# Patient Record
Sex: MALE | Race: WHITE | NOT HISPANIC OR LATINO | Employment: OTHER | URBAN - METROPOLITAN AREA
[De-identification: names, ages, dates, MRNs, and addresses within clinical notes are randomized per-mention and may not be internally consistent; named-entity substitution may affect disease eponyms.]

---

## 2017-07-24 ENCOUNTER — OFFICE VISIT (OUTPATIENT)
Dept: FAMILY MEDICINE | Facility: CLINIC | Age: 72
End: 2017-07-24
Payer: COMMERCIAL

## 2017-07-24 VITALS
DIASTOLIC BLOOD PRESSURE: 79 MMHG | RESPIRATION RATE: 16 BRPM | WEIGHT: 180.13 LBS | SYSTOLIC BLOOD PRESSURE: 132 MMHG | TEMPERATURE: 98 F | BODY MASS INDEX: 27.3 KG/M2 | HEIGHT: 68 IN | HEART RATE: 85 BPM

## 2017-07-24 DIAGNOSIS — E78.5 HYPERLIPIDEMIA LDL GOAL <130: ICD-10-CM

## 2017-07-24 DIAGNOSIS — J30.0 CHRONIC VASOMOTOR RHINITIS: ICD-10-CM

## 2017-07-24 DIAGNOSIS — I10 ESSENTIAL HYPERTENSION, BENIGN: ICD-10-CM

## 2017-07-24 DIAGNOSIS — R21 RASH AND OTHER NONSPECIFIC SKIN ERUPTION: ICD-10-CM

## 2017-07-24 DIAGNOSIS — Z00.00 ROUTINE HISTORY AND PHYSICAL EXAMINATION OF ADULT: Primary | ICD-10-CM

## 2017-07-24 LAB
ALT SERPL W P-5'-P-CCNC: 24 U/L (ref 0–70)
CHOLEST SERPL-MCNC: 164 MG/DL
HDLC SERPL-MCNC: 46 MG/DL
LDLC SERPL CALC-MCNC: 87 MG/DL
NONHDLC SERPL-MCNC: 118 MG/DL
TRIGL SERPL-MCNC: 157 MG/DL

## 2017-07-24 PROCEDURE — 36415 COLL VENOUS BLD VENIPUNCTURE: CPT | Performed by: FAMILY MEDICINE

## 2017-07-24 PROCEDURE — 99397 PER PM REEVAL EST PAT 65+ YR: CPT | Performed by: FAMILY MEDICINE

## 2017-07-24 PROCEDURE — 80061 LIPID PANEL: CPT | Performed by: FAMILY MEDICINE

## 2017-07-24 PROCEDURE — 84460 ALANINE AMINO (ALT) (SGPT): CPT | Performed by: FAMILY MEDICINE

## 2017-07-24 RX ORDER — TRIAMCINOLONE ACETONIDE 5 MG/G
CREAM TOPICAL
Qty: 30 G | Refills: 3 | Status: SHIPPED | OUTPATIENT
Start: 2017-07-24 | End: 2018-07-27

## 2017-07-24 RX ORDER — LOVASTATIN 40 MG
40 TABLET ORAL AT BEDTIME
Qty: 90 TABLET | Refills: 3 | Status: SHIPPED | OUTPATIENT
Start: 2017-07-24 | End: 2018-07-27

## 2017-07-24 RX ORDER — FLUTICASONE PROPIONATE 50 MCG
1-2 SPRAY, SUSPENSION (ML) NASAL DAILY PRN
Qty: 16 G | Refills: 11 | Status: SHIPPED | OUTPATIENT
Start: 2017-07-24 | End: 2018-07-27

## 2017-07-24 RX ORDER — ATENOLOL 25 MG/1
25 TABLET ORAL DAILY
Qty: 90 TABLET | Refills: 3 | Status: SHIPPED | OUTPATIENT
Start: 2017-07-24 | End: 2018-07-27

## 2017-07-24 NOTE — PATIENT INSTRUCTIONS
Preventive Health Recommendations:       Male Ages 65 and over    Yearly exam:             See your health care provider every year in order to  o   Review health changes.   o   Discuss preventive care.    o   Review your medicines if your doctor has prescribed any.    Talk with your health care provider about whether you should have a test to screen for prostate cancer (PSA).    Every 3 years, have a diabetes test (fasting glucose). If you are at risk for diabetes, you should have this test more often.    Every 5 years, have a cholesterol test. Have this test more often if you are at risk for high cholesterol or heart disease.     Every 10 years, have a colonoscopy. Or, have a yearly FIT test (stool test). These exams will check for colon cancer.    Talk to with your health care provider about screening for Abdominal Aortic Aneurysm if you have a family history of AAA or have a history of smoking.  Shots:     Get a flu shot each year.     Get a tetanus shot every 10 years.     Talk to your doctor about your pneumonia vaccines. There are now two you should receive - Pneumovax (PPSV 23) and Prevnar (PCV 13).    Talk to your doctor about a shingles vaccine.     Talk to your doctor about the hepatitis B vaccine.  Nutrition:     Eat at least 5 servings of fruits and vegetables each day.     Eat whole-grain bread, whole-wheat pasta and brown rice instead of white grains and rice.     Talk to your doctor about Calcium and Vitamin D.   Lifestyle    Exercise for at least 150 minutes a week (30 minutes a day, 5 days a week). This will help you control your weight and prevent disease.     Limit alcohol to one drink per day.     No smoking.     Wear sunscreen to prevent skin cancer.     See your dentist every six months for an exam and cleaning.     See your eye doctor every 1 to 2 years to screen for conditions such as glaucoma, macular degeneration and cataracts.      ASSESSMENT / PLAN:   (Z00.00) Routine history and  "physical examination of adult  (primary encounter diagnosis)  Comment:   Plan:   End of Life Planning:  Patient currently has an advanced directive: Yes.  Practitioner is supportive of decision.    COUNSELING:  Reviewed preventive health counseling, as reflected in patient instructions       Regular exercise       Healthy diet/nutrition       Vision screening       Hearing screening  Estimated body mass index is 27.8 kg/(m^2) as calculated from the following:    Height as of this encounter: 5' 7.5\" (1.715 m).    Weight as of this encounter: 180 lb 2 oz (81.7 kg).     reports that he quit smoking about 48 years ago. His smoking use included Cigarettes. He has a 7.00 pack-year smoking history. He has never used smokeless tobacco.    Appropriate preventive services were discussed with this patient, including applicable screening as appropriate for cardiovascular disease, diabetes, osteopenia/osteoporosis, and glaucoma.  As appropriate for age/gender, discussed screening for colorectal cancer, prostate cancer, breast cancer, and cervical cancer. Checklist reviewing preventive services available has been given to the patient.    Reviewed patients plan of care and provided an AVS. The Basic Care Plan (routine screening as documented in Health Maintenance) for Velasquez meets the Care Plan requirement. This Care Plan has been established and reviewed with the Patient.    Counseling Resources:  ATP IV Guidelines  Pooled Cohorts Equation Calculator  Breast Cancer Risk Calculator  FRAX Risk Assessment  ICSI Preventive Guidelines  Dietary Guidelines for Americans, 2010  USDA's MyPlate  ASA Prophylaxis  Lung CA Screening    (E78.5) Hyperlipidemia LDL goal <130  Comment:   Plan: lovastatin (MEVACOR) 40 MG tablet        Use the lower chol diet and daily exercise. Do the fasting lipids annually.     (I10) Essential hypertension, benign  Comment:   Plan: atenolol (TENORMIN) 25 MG tablet        Monitor and record the BP readings and " the goal for the average is under 130/80.   Use the med and the non drug therapies.     (J30.0) Chronic vasomotor rhinitis  Comment:   Plan: fluticasone (FLONASE) 50 MCG/ACT spray        Instructions given on diagnoses and refills done.     (R21) Rash and other nonspecific skin eruption  Comment:   Plan: triamcinolone (KENALOG) 0.5 % cream         Instructions given on diagnoses and refills done.

## 2017-07-24 NOTE — MR AVS SNAPSHOT
After Visit Summary   7/24/2017    Velasquez Dle Rio    MRN: [id number]           Patient Information     Date Of Birth          1945        Visit Information        Provider Department      7/24/2017 9:20 AM Cecilio Diaz MD [facility]        Today's Diagnoses     Routine history and physical examination of adult    -  1    Hyperlipidemia LDL goal <130        Essential hypertension, benign        Chronic vasomotor rhinitis        Rash and other nonspecific skin eruption          Care Instructions      Preventive Health Recommendations:       Male Ages 65 and over    Yearly exam:             See your health care provider every year in order to  o   Review health changes.   o   Discuss preventive care.    o   Review your medicines if your doctor has prescribed any.    Talk with your health care provider about whether you should have a test to screen for prostate cancer (PSA).    Every 3 years, have a diabetes test (fasting glucose). If you are at risk for diabetes, you should have this test more often.    Every 5 years, have a cholesterol test. Have this test more often if you are at risk for high cholesterol or heart disease.     Every 10 years, have a colonoscopy. Or, have a yearly FIT test (stool test). These exams will check for colon cancer.    Talk to with your health care provider about screening for Abdominal Aortic Aneurysm if you have a family history of AAA or have a history of smoking.  Shots:     Get a flu shot each year.     Get a tetanus shot every 10 years.     Talk to your doctor about your pneumonia vaccines. There are now two you should receive - Pneumovax (PPSV 23) and Prevnar (PCV 13).    Talk to your doctor about a shingles vaccine.     Talk to your doctor about the hepatitis B vaccine.  Nutrition:     Eat at least 5 servings of fruits and vegetables each day.     Eat whole-grain bread, whole-wheat pasta and brown rice instead of white grains and rice.  "    Talk to your doctor about Calcium and Vitamin D.   Lifestyle    Exercise for at least 150 minutes a week (30 minutes a day, 5 days a week). This will help you control your weight and prevent disease.     Limit alcohol to one drink per day.     No smoking.     Wear sunscreen to prevent skin cancer.     See your dentist every six months for an exam and cleaning.     See your eye doctor every 1 to 2 years to screen for conditions such as glaucoma, macular degeneration and cataracts.      ASSESSMENT / PLAN:   (Z00.00) Routine history and physical examination of adult  (primary encounter diagnosis)  Comment:   Plan:   End of Life Planning:  Patient currently has an advanced directive: Yes.  Practitioner is supportive of decision.    COUNSELING:  Reviewed preventive health counseling, as reflected in patient instructions       Regular exercise       Healthy diet/nutrition       Vision screening       Hearing screening  Estimated body mass index is 27.8 kg/(m^2) as calculated from the following:    Height as of this encounter: 5' 7.5\" (1.715 m).    Weight as of this encounter: 180 lb 2 oz (81.7 kg).     reports that he quit smoking about 48 years ago. His smoking use included Cigarettes. He has a 7.00 pack-year smoking history. He has never used smokeless tobacco.    Appropriate preventive services were discussed with this patient, including applicable screening as appropriate for cardiovascular disease, diabetes, osteopenia/osteoporosis, and glaucoma.  As appropriate for age/gender, discussed screening for colorectal cancer, prostate cancer, breast cancer, and cervical cancer. Checklist reviewing preventive services available has been given to the patient.    Reviewed patients plan of care and provided an AVS. The Basic Care Plan (routine screening as documented in Health Maintenance) for Velasquez meets the Care Plan requirement. This Care Plan has been established and reviewed with the Patient.    Counseling " Resources:  ATP IV Guidelines  Pooled Cohorts Equation Calculator  Breast Cancer Risk Calculator  FRAX Risk Assessment  ICSI Preventive Guidelines  Dietary Guidelines for Americans, 2010  USDA's MyPlate  ASA Prophylaxis  Lung CA Screening    (E78.5) Hyperlipidemia LDL goal <130  Comment:   Plan: lovastatin (MEVACOR) 40 MG tablet        Use the lower chol diet and daily exercise. Do the fasting lipids annually.     (I10) Essential hypertension, benign  Comment:   Plan: atenolol (TENORMIN) 25 MG tablet        Monitor and record the BP readings and the goal for the average is under 130/80.   Use the med and the non drug therapies.     (J30.0) Chronic vasomotor rhinitis  Comment:   Plan: fluticasone (FLONASE) 50 MCG/ACT spray        Instructions given on diagnoses and refills done.     (R21) Rash and other nonspecific skin eruption  Comment:   Plan: triamcinolone (KENALOG) 0.5 % cream         Instructions given on diagnoses and refills done.            Follow-ups after your visit        Who to contact     If you have questions or need follow up information about today's clinic visit or your schedule please contact [facility] directly at [phone].  Normal or non-critical lab and imaging results will be communicated to you by CLAREDhart, letter or phone within 4 business days after the clinic has received the results. If you do not hear from us within 7 days, please contact the clinic through WallCompasst or phone. If you have a critical or abnormal lab result, we will notify you by phone as soon as possible.  Submit refill requests through Ahead or call your pharmacy and they will forward the refill request to us. Please allow 3 business days for your refill to be completed.          Additional Information About Your Visit        CLAREDharCareTree Information     Ahead lets you send messages to your doctor, view your test results, renew your prescriptions, schedule appointments and more. To sign up, go to  [email or web address] . Click on \"Log in\" on the left side of the screen, which will take you to the Welcome page. Then click on \"Sign up Now\" on the right side of the page.     You will be asked to enter the access code listed below, as well as some personal information. Please follow the directions to create your username and password.     Your access code is: [email or web address]  Expires: 10/22/2017 10:07 AM     Your access code will  in 90 days. If you need help or a new code, please call your [place] clinic or [phone].        Care EveryWhere ID     This is your Care EveryWhere ID. This could be used by other organizations to access your [place] medical records  [id number]        Your Vitals Were     Pulse Temperature Respirations Height BMI (Body Mass Index)       85 98  F (36.7  C) (Tympanic) 16 5' 7.5\" (1.715 m) 27.8 kg/m2        Blood Pressure from Last 3 Encounters:   17 132/79   08/10/16 125/76   16 119/77    Weight from Last 3 Encounters:   17 180 lb 2 oz (81.7 kg)   08/10/16 180 lb (81.6 kg)   16 180 lb (81.6 kg)              We Performed the Following     ALT     Lipid panel reflex to direct LDL     OFFICE/OUTPT VISIT,EST,LEVL III          Where to get your medicines      These medications were sent to [facility] - [facility] [address]  [address] [address]     Phone:  [phone]     atenolol 25 MG tablet    fluticasone 50 MCG/ACT spray    lovastatin 40 MG tablet    triamcinolone 0.5 % cream          Primary Care Provider Office Phone # Fax #    Cecilio Diaz [phone]       [facility] [address] [address]        Equal Access to Services     STEPHON HAY AH: Ayan Carcamo, monster posey, flavio torresarash la'aan ah. [facility] [phone].    ATENCIÓN: Si habla español, tiene a bradley disposición servicios " jose de asistencia lingüística. Thania israel [phone].    We comply with applicable federal civil rights laws and [place] laws. We do not discriminate on the basis of race, color, national origin, age, disability sex, sexual orientation or gender identity.            Thank you!     Thank you for choosing [facility]  for your care. Our goal is always to provide you with excellent care. Hearing back from our patients is one way we can continue to improve our services. Please take a few minutes to complete the written survey that you may receive in the mail after your visit with us. Thank you!             Your Updated Medication List - Protect others around you: Learn how to safely use, store and throw away your medicines at www.disposemymeds.org.          This list is accurate as of: 7/24/17 10:11 AM.  Always use your most recent med list.                   Brand Name Dispense Instructions for use Diagnosis    aspirin 81 MG tablet      1 TABLET DAILY    Essential hypertension, benign       atenolol 25 MG tablet    TENORMIN    90 tablet    Take 1 tablet (25 mg) by mouth daily    Essential hypertension, benign       fluticasone 50 MCG/ACT spray    FLONASE    16 g    Spray 1-2 sprays into both nostrils daily as needed for rhinitis    Chronic vasomotor rhinitis       lovastatin 40 MG tablet    MEVACOR    90 tablet    Take 1 tablet (40 mg) by mouth At Bedtime    Hyperlipidemia LDL goal <130       triamcinolone 0.5 % cream    KENALOG    30 g    Apply topically twice a day as needed to affected area. Call to refill.    Rash and other nonspecific skin eruption

## 2017-07-24 NOTE — LETTER
Velasquez Del Rio  [address]  [place] [address]        July 24, 2017          Dear Eliane,    We are writing to inform you of your test results.    Your lab results were normal.    Component      Latest Ref Rng & Units 7/24/2017   Cholesterol      <200 mg/dL 164   Triglycerides      <150 mg/dL 157 (H)   HDL Cholesterol      >39 mg/dL 46   LDL Cholesterol Calculated      <100 mg/dL 87   Non HDL Cholesterol      <130 mg/dL 118   ALT      0 - 70 U/L 24       If you have any questions or concerns, please call the clinic at the number listed above.       Sincerely,      Cecilio Diaz MD

## 2017-07-24 NOTE — PROGRESS NOTES
SUBJECTIVE:   Velasquez Del Rio is a 71 year old male who presents for Preventive Visit.    Are you in the first 12 months of your Medicare Part B coverage?  No    Healthy Habits:    Do you get at least three servings of calcium containing foods daily (dairy, green leafy vegetables, etc.)? yes    Amount of exercise or daily activities, outside of work: patient does stretching exercises daily and sit ups and stationary bicycle.    Problems taking medications regularly No    Medication side effects: No    Have you had an eye exam in the past two years? [facility].    Do you see a dentist twice per year? yes    Do you have sleep apnea, excessive snoring or daytime drowsiness?no    COGNITIVE SCREEN  1) Repeat 3 items (Banana, Sunrise, Chair)    2) Clock draw: NORMAL  3) 3 item recall: Recalls 3 objects  Results: 3 items recalled: COGNITIVE IMPAIRMENT LESS LIKELY    Mini-CogTM Copyright [place]. Licensed by the author for use in [facility]; reprinted with permission ([email or web address]). All rights reserved.        Reviewed and updated as needed this visit by clinical staffTobacco  Allergies  Med Hx  Surg Hx  Fam Hx  Soc Hx        Reviewed and updated as needed this visit by Provider        Social History   Substance Use Topics     Smoking status: Former Smoker     Packs/day: 1.00     Years: 7.00     Types: Cigarettes     Quit date: 11/13/1968     Smokeless tobacco: Never Used     Alcohol use Yes      Comment: minimal       very little alcohol    Today's PHQ-2 Score:   PHQ-2 ( 1999 [employer]) 7/24/2017 7/22/2016   Q1: Little interest or pleasure in doing things 0 0   Q2: Feeling down, depressed or hopeless 0 0   PHQ-2 Score 0 0         Do you feel safe in your environment - Yes    Do you have a Health Care Directive?: Yes: Advance Directive has been received and scanned.    Current providers sharing in care for this patient include: Patient Care Team:  Cecilio Diaz MD as PCP - General (Family  "Practice)      Hearing impairment: No    Ability to successfully perform activities of daily living: Yes, no assistance needed     Fall risk:  Fallen 2 or more times in the past year?: No  Any fall with injury in the past year?: No      Home safety:  none identified      The following health maintenance items are reviewed in Epic and correct as of today:Health Maintenance   Topic Date Due     BMP Q1 YR  11/18/2014     PSA Q1 YR  11/18/2014     FALL RISK ASSESSMENT  08/24/2016     ADVANCE DIRECTIVE PLANNING Q5 YRS  11/14/2016     ALT Q1 YR  07/22/2017     LIPID MONITORING Q1 YEAR  07/22/2017     INFLUENZA VACCINE (SYSTEM ASSIGNED)  09/01/2017     COLONOSCOPY Q3 YR  08/10/2019     TETANUS IMMUNIZATION (SYSTEM ASSIGNED)  09/24/2022     PNEUMOCOCCAL  Completed     AORTIC ANEURYSM SCREENING (SYSTEM ASSIGNED)  Completed     ROS:  C: NEGATIVE for fever, chills, change in weight  I: NEGATIVE for worrisome rashes, moles or lesions  E: NEGATIVE for vision changes or irritation  E/M: NEGATIVE for ear, mouth and throat problems  R: NEGATIVE for significant cough or SOB  CV: NEGATIVE for chest pain, palpitations or peripheral edema  GI: NEGATIVE for nausea, abdominal pain, heartburn, or change in bowel habits  : NEGATIVE for frequency, dysuria, or hematuria  M: NEGATIVE for significant arthralgias or myalgia  N: NEGATIVE for weakness, dizziness or paresthesias  E: NEGATIVE for temperature intolerance, skin/hair changes  H: NEGATIVE for bleeding problems  P: NEGATIVE for changes in mood or affect    OBJECTIVE:   /79  Pulse 85  Temp 98  F (36.7  C) (Tympanic)  Resp 16  Ht 5' 7.5\" (1.715 m)  Wt 180 lb 2 oz (81.7 kg)  BMI 27.8 kg/m2 Estimated body mass index is 27.8 kg/(m^2) as calculated from the following:    Height as of this encounter: 5' 7.5\" (1.715 m).    Weight as of this encounter: 180 lb 2 oz (81.7 kg).  EXAM:   GENERAL APPEARANCE: healthy, alert and no distress  EYES: EOMI,  PERRL  HENT: ear canals and TM's " "normal and nose and mouth without ulcers or lesions  NECK: no adenopathy, no asymmetry, masses, or scars and thyroid normal to palpation  RESP: lungs clear to auscultation - no rales, rhonchi or wheezes  CV: regular rates and rhythm, normal S1 S2, no S3 or S4 and no murmur, click or rub -  ABDOMEN:  soft, nontender, no HSM or masses and bowel sounds normal  GU_male: testicles normal without atrophy or masses, no hernias and penis normal without urethral discharge  MS: extremities normal- no gross deformities noted, no evidence of inflammation in joints, FROM in all extremities.  MS: the hamstrings are tight; there is kyphosis of the thoracic  SKIN: no suspicious lesions or rashes  NEURO: Normal strength and tone, sensory exam grossly normal, mentation intact and speech normal  PSYCH: mentation appears normal and affect normal/bright  LYMPHATICS: No axillary, cervical, inguinal, or supraclavicular nodes      ASSESSMENT / PLAN:   (Z00.00) Routine history and physical examination of adult  (primary encounter diagnosis)  Comment:   Plan:   End of Life Planning:  Patient currently has an advanced directive: Yes.  Practitioner is supportive of decision.    COUNSELING:  Reviewed preventive health counseling, as reflected in patient instructions       Regular exercise       Healthy diet/nutrition       Vision screening       Hearing screening  Estimated body mass index is 27.8 kg/(m^2) as calculated from the following:    Height as of this encounter: 5' 7.5\" (1.715 m).    Weight as of this encounter: 180 lb 2 oz (81.7 kg).     reports that he quit smoking about 48 years ago. His smoking use included Cigarettes. He has a 7.00 pack-year smoking history. He has never used smokeless tobacco.    Appropriate preventive services were discussed with this patient, including applicable screening as appropriate for cardiovascular disease, diabetes, osteopenia/osteoporosis, and glaucoma.  As appropriate for age/gender, discussed " screening for colorectal cancer, prostate cancer, breast cancer, and cervical cancer. Checklist reviewing preventive services available has been given to the patient.    Reviewed patients plan of care and provided an AVS. The Basic Care Plan (routine screening as documented in Health Maintenance) for Velasquez meets the Care Plan requirement. This Care Plan has been established and reviewed with the Patient.    Counseling Resources:  ATP IV Guidelines  Pooled Cohorts Equation Calculator  Breast Cancer Risk Calculator  FRAX Risk Assessment  ICSI Preventive Guidelines  Dietary Guidelines for Americans, 2010  USDA's MyPlate  ASA Prophylaxis  Lung CA Screening    (E78.5) Hyperlipidemia LDL goal <130  Comment:   Plan: lovastatin (MEVACOR) 40 MG tablet        Use the lower chol diet and daily exercise. Do the fasting lipids annually.     (I10) Essential hypertension, benign  Comment:   Plan: atenolol (TENORMIN) 25 MG tablet        Monitor and record the BP readings and the goal for the average is under 130/80.   Use the med and the non drug therapies.     (J30.0) Chronic vasomotor rhinitis  Comment:   Plan: fluticasone (FLONASE) 50 MCG/ACT spray        Instructions given on diagnoses and refills done.     (R21) Rash and other nonspecific skin eruption  Comment:   Plan: triamcinolone (KENALOG) 0.5 % cream         Instructions given on diagnoses and refills done.      Cecilio Diaz MD  [facility]

## 2017-07-24 NOTE — NURSING NOTE
"Chief Complaint   Patient presents with     Wellness Visit     Patient is FASTING today.  Did not take any medications either.       Initial /79  Pulse 85  Temp 98  F (36.7  C) (Tympanic)  Resp 16  Ht 5' 7.5\" (1.715 m)  Wt 180 lb 2 oz (81.7 kg)  BMI 27.8 kg/m2 Estimated body mass index is 27.8 kg/(m^2) as calculated from the following:    Height as of this encounter: 5' 7.5\" (1.715 m).    Weight as of this encounter: 180 lb 2 oz (81.7 kg).    Medication Reconciliation:  complete    Ashley Pierce CMA (AAMA)  "

## 2017-09-25 ENCOUNTER — ALLIED HEALTH/NURSE VISIT (OUTPATIENT)
Dept: FAMILY MEDICINE | Facility: CLINIC | Age: 72
End: 2017-09-25
Payer: COMMERCIAL

## 2017-09-25 DIAGNOSIS — Z23 NEED FOR PROPHYLACTIC VACCINATION AND INOCULATION AGAINST INFLUENZA: Primary | ICD-10-CM

## 2017-09-25 PROCEDURE — G0008 ADMIN INFLUENZA VIRUS VAC: HCPCS

## 2017-09-25 PROCEDURE — 99207 ZZC NO CHARGE NURSE ONLY: CPT

## 2017-09-25 PROCEDURE — 90662 IIV NO PRSV INCREASED AG IM: CPT

## 2017-09-25 NOTE — PROGRESS NOTES
Injectable Influenza Immunization Documentation    1.  Is the person to be vaccinated sick today?   No    2. Does the person to be vaccinated have an allergy to a component   of the vaccine?   No    3. Has the person to be vaccinated ever had a serious reaction   to influenza vaccine in the past?   No    4. Has the person to be vaccinated ever had Guillain-Barré syndrome?   No    Form completed by Ashley Pierce CMA ([facility]) 1:57 PM 9/25/2017

## 2017-09-25 NOTE — MR AVS SNAPSHOT
"              After Visit Summary   2017    Velasquez Del Rio    MRN: [id number]           Patient Information     Date Of Birth          1945        Visit Information        Provider Department      2017 1:35 PM [facility]        Today's Diagnoses     Need for prophylactic vaccination and inoculation against influenza    -  1       Follow-ups after your visit        Who to contact     If you have questions or need follow up information about today's clinic visit or your schedule please contact [facility] directly at [phone].  Normal or non-critical lab and imaging results will be communicated to you by [employer], letter or phone within 4 business days after the clinic has received the results. If you do not hear from us within 7 days, please contact the clinic through [employer] or phone. If you have a critical or abnormal lab result, we will notify you by phone as soon as possible.  Submit refill requests through [employer] or call your pharmacy and they will forward the refill request to us. Please allow 3 business days for your refill to be completed.          Additional Information About Your Visit        MyChart Information     [employer] lets you send messages to your doctor, view your test results, renew your prescriptions, schedule appointments and more. To sign up, go to [email or web address] . Click on \"Log in\" on the left side of the screen, which will take you to the Welcome page. Then click on \"Sign up Now\" on the right side of the page.     You will be asked to enter the access code listed below, as well as some personal information. Please follow the directions to create your username and password.     Your access code is: [email or web address]  Expires: 10/22/2017 10:07 AM     Your access code will  in 90 days. If you need help or a new code, please call your [facility] or [phone].        Care EveryWhere ID     This is your Care " EveryWhere ID. This could be used by other organizations to access your [place] medical records  [id number]         Blood Pressure from Last 3 Encounters:   07/24/17 132/79   08/10/16 125/76   07/22/16 119/77    Weight from Last 3 Encounters:   07/24/17 180 lb 2 oz (81.7 kg)   08/10/16 180 lb (81.6 kg)   07/22/16 180 lb (81.6 kg)              We Performed the Following     ADMIN INFLUENZA (For MEDICARE Patients ONLY) []     FLU VACCINE, INCREASED ANTIGEN, PRESV FREE, AGE 65+ [85076]        Primary Care Provider Office Phone # Fax #    Cecilio Loco Diaz [phone] [address] [facility] [place] [address]        Equal Access to Services     STEPHON HAY : Hadii july medinao Solilibeth, waaxda luqadaha, qaybta kaalmada adeegyada, flavio maravilla . So [facility] [phone].    ATENCIÓN: Si habla español, tiene a bradley disposición servicios gratuitos de asistencia lingüística. Llame al [phone].    We comply with applicable federal civil rights laws and [place] laws. We do not discriminate on the basis of race, color, national origin, age, disability sex, sexual orientation or gender identity.            Thank you!     Thank you for choosing [facility]  for your care. Our goal is always to provide you with excellent care. Hearing back from our patients is one way we can continue to improve our services. Please take a few minutes to complete the written survey that you may receive in the mail after your visit with us. Thank you!             Your Updated Medication List - Protect others around you: Learn how to safely use, store and throw away your medicines at www.disposemymeds.org.          This list is accurate as of: 9/25/17  1:58 PM.  Always use your most recent med list.                   Brand Name Dispense Instructions for use Diagnosis    aspirin 81 MG tablet      1 TABLET DAILY    Essential hypertension, benign       atenolol 25 MG tablet     TENORMIN    90 tablet    Take 1 tablet (25 mg) by mouth daily    Essential hypertension, benign       fluticasone 50 MCG/ACT spray    FLONASE    16 g    Spray 1-2 sprays into both nostrils daily as needed for rhinitis    Chronic vasomotor rhinitis       lovastatin 40 MG tablet    MEVACOR    90 tablet    Take 1 tablet (40 mg) by mouth At Bedtime    Hyperlipidemia LDL goal <130       triamcinolone 0.5 % cream    KENALOG    30 g    Apply topically twice a day as needed to affected area. Call to refill.    Rash and other nonspecific skin eruption

## 2018-05-30 ENCOUNTER — TELEPHONE (OUTPATIENT)
Dept: FAMILY MEDICINE | Facility: CLINIC | Age: 73
End: 2018-05-30

## 2018-05-30 ENCOUNTER — OFFICE VISIT (OUTPATIENT)
Dept: FAMILY MEDICINE | Facility: CLINIC | Age: 73
End: 2018-05-30
Payer: COMMERCIAL

## 2018-05-30 VITALS
HEART RATE: 71 BPM | SYSTOLIC BLOOD PRESSURE: 125 MMHG | BODY MASS INDEX: 28.64 KG/M2 | WEIGHT: 185.6 LBS | TEMPERATURE: 98.1 F | DIASTOLIC BLOOD PRESSURE: 78 MMHG

## 2018-05-30 DIAGNOSIS — H00.012 HORDEOLUM EXTERNUM OF RIGHT LOWER EYELID: Primary | ICD-10-CM

## 2018-05-30 DIAGNOSIS — H02.843 SWELLING OF RIGHT EYELID: ICD-10-CM

## 2018-05-30 PROCEDURE — 99213 OFFICE O/P EST LOW 20 MIN: CPT | Performed by: NURSE PRACTITIONER

## 2018-05-30 RX ORDER — ERYTHROMYCIN 5 MG/G
0.25 OINTMENT OPHTHALMIC 2 TIMES DAILY
Qty: 1 G | Refills: 0 | Status: SHIPPED | OUTPATIENT
Start: 2018-05-30 | End: 2019-04-11

## 2018-05-30 ASSESSMENT — PAIN SCALES - GENERAL: PAINLEVEL: NO PAIN (0)

## 2018-05-30 NOTE — PROGRESS NOTES
SUBJECTIVE:   Velasquez Del Rio is a 72 year old male who presents to clinic today for the following health issues:      Chief Complaint   Patient presents with     Ent Problem     Symptoms for one week. Sty on right lower eye. Did notice a white spot at location, no drainage or pain.Has tried hot wet compress to no effect. Has had similar issues in the past. No injury or incident, does note that it was very windy the day before symptoms started.      Started last week with small red swollen area on lower eyelid.  Started using warm packs at that time and has not noticed improvement.  Reports some increased swelling/redness in lower eyelid since onset.    History of stye/infections in the past - treated successfully with topical antibiotic ointment.  Has appointment for next week with [facility] here in [place] if needed.    No eye pain, vision changes or discharge in right eye.  Some mild itching in right eye reported.    Problem list and histories reviewed & adjusted, as indicated.  Additional history: as documented    Patient Active Problem List   Diagnosis     Benign neoplasm of colon     Hyperlipidemia LDL goal <130     Advanced directives, counseling/discussion     Health Care Home     Herpes zoster     Diverticulosis of large intestine     Family history of colon cancer     Family history of malignant neoplasm of breast     Essential hypertension with goal blood pressure less than 140/90     Dermatitis     Chronic vasomotor rhinitis     Past Surgical History:   Procedure Laterality Date     COLONOSCOPY  2001    negative      COLONOSCOPY  12/13/2011    Procedure:COLONOSCOPY; Colonoscopy; Surgeon:DARCIE FISHER; Location:[facility]     COLONOSCOPY N/A 8/10/2016    Procedure: COLONOSCOPY;  Surgeon: Morales Renteria MD;  Location: [facility]       Social History   Substance Use Topics     Smoking status: Former Smoker     Packs/day: 1.00     Years: 7.00     Types: Cigarettes     Quit date: 11/13/1968      Smokeless tobacco: Never Used     Alcohol use Yes      Comment: minimal     Family History   Problem Relation Age of Onset     Hypertension Mother      CEREBROVASCULAR DISEASE Mother       of a stroke     Cancer - colorectal Mother      age 70s     Prostate Cancer Father      had prostate removed     DIABETES Maternal Grandmother      CANCER Brother      bladder cancer     Colon Cancer Daughter 53     colon cancer surger     Breast Cancer Daughter      Asthma No family hx of      C.A.D. No family hx of          Current Outpatient Prescriptions   Medication Sig Dispense Refill     ASPIRIN 81 MG OR TABS 1 TABLET DAILY       atenolol (TENORMIN) 25 MG tablet Take 1 tablet (25 mg) by mouth daily 90 tablet 3     erythromycin (ROMYCIN) ophthalmic ointment Place 0.25 inches into the right eye 2 times daily 1 g 0     fluticasone (FLONASE) 50 MCG/ACT spray Spray 1-2 sprays into both nostrils daily as needed for rhinitis 16 g 11     lovastatin (MEVACOR) 40 MG tablet Take 1 tablet (40 mg) by mouth At Bedtime 90 tablet 3     triamcinolone (KENALOG) 0.5 % cream Apply topically twice a day as needed to affected area. Call to refill. (Patient not taking: Reported on 2018) 30 g 3     Allergies   Allergen Reactions     Nkda [No Known Drug Allergies]      Recent Labs   Lab Test  17   1013  16   0953  08/24/15   0956   13   1004  11/15/12   0947   LDL  87  84  88   < >  113  113   HDL  46  48  44   < >  46  42   TRIG  157*  172*  160*   < >  150  172*   ALT  24  34  25   < >  32  28   CR   --    --    --    --   0.85  0.91   GFRESTIMATED   --    --    --    --   89  83   GFRESTBLACK   --    --    --    --   >90  >90   POTASSIUM   --    --    --    --   4.2  4.3    < > = values in this interval not displayed.      BP Readings from Last 3 Encounters:   18 125/78   17 132/79   08/10/16 125/76    Wt Readings from Last 3 Encounters:   18 185 lb 9.6 oz (84.2 kg)   17 180 lb 2 oz (81.7 kg)    08/10/16 180 lb (81.6 kg)                  Labs reviewed in EPIC    Reviewed and updated as needed this visit by clinical staff  Tobacco  Allergies  Meds  Med Hx  Surg Hx  Fam Hx  Soc Hx      Reviewed and updated as needed this visit by Provider         ROS:  Constitutional, HEENT, cardiovascular, pulmonary, GI, , musculoskeletal, neuro, skin, endocrine and psych systems are negative, except as otherwise noted.    OBJECTIVE:     /78  Pulse 71  Temp 98.1  F (36.7  C) (Tympanic)  Wt 185 lb 9.6 oz (84.2 kg)  BMI 28.64 kg/m2  Body mass index is 28.64 kg/(m^2).  GENERAL: healthy, alert and no distress  EYES: PERRL, EOMI, eyelids- hordeolum/sty OD inner eye and with surrounding mild swelling      Diagnostic Test Results:  none     ASSESSMENT/PLAN:     1. Hordeolum externum of right lower eyelid   The risks, benefits and treatment options of prescribed medications or other treatments have been discussed with the patient. The patient verbalized their understanding and should call or follow up if no improvement or if they develop further problems.    - erythromycin (ROMYCIN) ophthalmic ointment; Place 0.25 inches into the right eye 2 times daily  Dispense: 1 g; Refill: 0  - OPHTHALMOLOGY ADULT REFERRAL    2. Swelling of right eyelid     - erythromycin (ROMYCIN) ophthalmic ointment; Place 0.25 inches into the right eye 2 times daily  Dispense: 1 g; Refill: 0  - OPHTHALMOLOGY ADULT REFERRAL    Patient Instructions     Sty (or Stye)  A sty is an infection of the oil gland of the eyelid. It may develop into a small pocket of pus (an abscess). This can cause pain, redness, and swelling. In early stages, a sty is treated with antibiotic cream, eye drops, or a small towel soaked in warm water (a warm compress). More severe cases may need to be opened and drained by a healthcare provider.  Home care    Eye drops or ointment are usually prescribed to treat the infection. Use these as directed.     Artificial tears  may also be used to lubricate the eye and make it more comfortable. You can buy these over the counter without a prescription. Talk with your healthcare provider before using any over-the-counter treatment for a sty.    Apply a warm, damp towel to the affected eye for at least 5 minutes, 3 to 4 times a day for a week. Warm compresses open the pores and speed the healing. But if the compresses are too hot, they may burn your eyelid.    Sometimes the sty will drain with this treatment alone. If this happens, keep using the antibiotic until all the redness and swelling are gone.    Wash your hands before and after touching the infected eyelid to avoid spreading the infection.    Don t squeeze or try to break open the sty.  Follow-up care  Follow up with your healthcare provider, or as advised.   When to seek medical advice  Call your healthcare provider right away if any of these occur:    Increase in swelling or redness around the eyelid after 48 to 72 hours    Increase in eye pain or the eyelid blisters    Increase in warmth--the eyelid feels hot    Drainage of blood or thick pus from the sty    Blister on the eyelid    Inability to open the eyelid due to swelling    Fever of 100.4 F (38 C) or above, or as directed by your provider    Vision changes    Headache or stiff neck    The sty comes back  Date Last Reviewed: 8/1/2017 2000-2017 [employer]). [address] [place] [address], [place] [address]. All rights reserved. This information is not intended as a substitute for professional medical care. Always follow your healthcare professional's instructions.            Smiley Plascencia NP  [facility]

## 2018-05-30 NOTE — PATIENT INSTRUCTIONS
Sty (or Stye)  A sty is an infection of the oil gland of the eyelid. It may develop into a small pocket of pus (an abscess). This can cause pain, redness, and swelling. In early stages, a sty is treated with antibiotic cream, eye drops, or a small towel soaked in warm water (a warm compress). More severe cases may need to be opened and drained by a healthcare provider.  Home care    Eye drops or ointment are usually prescribed to treat the infection. Use these as directed.     Artificial tears may also be used to lubricate the eye and make it more comfortable. You can buy these over the counter without a prescription. Talk with your healthcare provider before using any over-the-counter treatment for a sty.    Apply a warm, damp towel to the affected eye for at least 5 minutes, 3 to 4 times a day for a week. Warm compresses open the pores and speed the healing. But if the compresses are too hot, they may burn your eyelid.    Sometimes the sty will drain with this treatment alone. If this happens, keep using the antibiotic until all the redness and swelling are gone.    Wash your hands before and after touching the infected eyelid to avoid spreading the infection.    Don t squeeze or try to break open the sty.  Follow-up care  Follow up with your healthcare provider, or as advised.   When to seek medical advice  Call your healthcare provider right away if any of these occur:    Increase in swelling or redness around the eyelid after 48 to 72 hours    Increase in eye pain or the eyelid blisters    Increase in warmth--the eyelid feels hot    Drainage of blood or thick pus from the sty    Blister on the eyelid    Inability to open the eyelid due to swelling    Fever of 100.4 F (38 C) or above, or as directed by your provider    Vision changes    Headache or stiff neck    The sty comes back  Date Last Reviewed: 8/1/2017 2000-2017 [employer]. [address] [place] [address], [place] [address]. All rights  reserved. This information is not intended as a substitute for professional medical care. Always follow your healthcare professional's instructions.

## 2018-05-30 NOTE — MR AVS SNAPSHOT
After Visit Summary   5/30/2018    Velasquez Del Rio    MRN: [id number]           Patient Information     Date Of Birth          1945        Visit Information        Provider Department      5/30/2018 10:00 AM Smiley Plascencia NP [facility]        Today's Diagnoses     Hordeolum externum of right lower eyelid    -  1    Swelling of right eyelid          Care Instructions      Sty (or Stye)  A sty is an infection of the oil gland of the eyelid. It may develop into a small pocket of pus (an abscess). This can cause pain, redness, and swelling. In early stages, a sty is treated with antibiotic cream, eye drops, or a small towel soaked in warm water (a warm compress). More severe cases may need to be opened and drained by a healthcare provider.  Home care    Eye drops or ointment are usually prescribed to treat the infection. Use these as directed.     Artificial tears may also be used to lubricate the eye and make it more comfortable. You can buy these over the counter without a prescription. Talk with your healthcare provider before using any over-the-counter treatment for a sty.    Apply a warm, damp towel to the affected eye for at least 5 minutes, 3 to 4 times a day for a week. Warm compresses open the pores and speed the healing. But if the compresses are too hot, they may burn your eyelid.    Sometimes the sty will drain with this treatment alone. If this happens, keep using the antibiotic until all the redness and swelling are gone.    Wash your hands before and after touching the infected eyelid to avoid spreading the infection.    Don t squeeze or try to break open the sty.  Follow-up care  Follow up with your healthcare provider, or as advised.   When to seek medical advice  Call your healthcare provider right away if any of these occur:    Increase in swelling or redness around the eyelid after 48 to 72 hours    Increase in eye pain or the eyelid blisters    Increase in  warmth--the eyelid feels hot    Drainage of blood or thick pus from the sty    Blister on the eyelid    Inability to open the eyelid due to swelling    Fever of 100.4 F (38 C) or above, or as directed by your provider    Vision changes    Headache or stiff neck    The sty comes back  Date Last Reviewed: 8/1/2017 2000-2017 [employer]. [address] [place] [address]. All rights reserved. This information is not intended as a substitute for professional medical care. Always follow your healthcare professional's instructions.                Follow-ups after your visit        Additional Services     OPHTHALMOLOGY ADULT REFERRAL       Your provider has referred you to: [facility]: [facility] [phone]   [email or web address]    Please be aware that coverage of these services is subject to the terms and limitations of your health insurance plan.  Call member services at your health plan with any benefit or coverage questions.      Please bring the following with you to your appointment:    (1) Any X-Rays, CTs or MRIs which have been performed.  Contact the facility where they were done to arrange for  prior to your scheduled appointment.    (2) List of current medications  (3) This referral request   (4) Any documents/labs given to you for this referral                  Who to contact     If you have questions or need follow up information about today's clinic visit or your schedule please contact [facility] directly at [phone].  Normal or non-critical lab and imaging results will be communicated to you by MyChart, letter or phone within 4 business days after the clinic has received the results. If you do not hear from us within 7 days, please contact the clinic through MyChart or phone. If you have a critical or abnormal lab result, we will notify you by phone as soon as possible.  Submit refill requests through [employer] or call your pharmacy and  they will forward the refill request to us. Please allow 3 business days for your refill to be completed.          Additional Information About Your Visit        Care EveryWhere ID     This is your Care EveryWhere ID. This could be used by other organizations to access your [place] medical records  [id number]        Your Vitals Were     Pulse Temperature BMI (Body Mass Index)             71 98.1  F (36.7  C) (Tympanic) 28.64 kg/m2          Blood Pressure from Last 3 Encounters:   05/30/18 125/78   07/24/17 132/79   08/10/16 125/76    Weight from Last 3 Encounters:   05/30/18 185 lb 9.6 oz (84.2 kg)   07/24/17 180 lb 2 oz (81.7 kg)   08/10/16 180 lb (81.6 kg)              We Performed the Following     OPHTHALMOLOGY ADULT REFERRAL          Today's Medication Changes          These changes are accurate as of 5/30/18 10:28 AM.  If you have any questions, ask your nurse or doctor.               Start taking these medicines.        Dose/Directions    erythromycin ophthalmic ointment   Commonly known as:  ROMYCIN   Used for:  Hordeolum externum of right lower eyelid, Swelling of right eyelid   Started by:  Smiley Plascencia NP        Dose:  0.25 inch   Place 0.25 inches into the right eye 2 times daily   Quantity:  1 g   Refills:  0            Where to get your medicines      These medications were sent to [facility] - [place], [place] - [address]  [address] [facility] [address]     Phone:  [phone]     erythromycin ophthalmic ointment                Primary Care Provider Office Phone # Fax #    Cecilio Diaz [phone] [address] [facility] [address]        Equal Access to Services     [facility]: Hadii july lucio Solilibeth, waaxda luqadaha, qaybta kaalmada adegeorgeyada, flavio hicks. So [facility] [phone].    ATENCIÓN: Si habla español, tiene a bradley disposición servicios gratuitos de asistencia lingüística. Llame al  [phone].    We comply with applicable federal civil rights laws and [place] laws. We do not discriminate on the basis of race, color, national origin, age, disability, sex, sexual orientation, or gender identity.            Thank you!     Thank you for choosing [facility]  for your care. Our goal is always to provide you with excellent care. Hearing back from our patients is one way we can continue to improve our services. Please take a few minutes to complete the written survey that you may receive in the mail after your visit with us. Thank you!             Your Updated Medication List - Protect others around you: Learn how to safely use, store and throw away your medicines at www.disposemymeds.org.          This list is accurate as of 5/30/18 10:28 AM.  Always use your most recent med list.                   Brand Name Dispense Instructions for use Diagnosis    aspirin 81 MG tablet      1 TABLET DAILY    Essential hypertension, benign       atenolol 25 MG tablet    TENORMIN    90 tablet    Take 1 tablet (25 mg) by mouth daily    Essential hypertension, benign       erythromycin ophthalmic ointment    ROMYCIN    1 g    Place 0.25 inches into the right eye 2 times daily    Hordeolum externum of right lower eyelid, Swelling of right eyelid       fluticasone 50 MCG/ACT spray    FLONASE    16 g    Spray 1-2 sprays into both nostrils daily as needed for rhinitis    Chronic vasomotor rhinitis       lovastatin 40 MG tablet    MEVACOR    90 tablet    Take 1 tablet (40 mg) by mouth At Bedtime    Hyperlipidemia LDL goal <130       triamcinolone 0.5 % cream    KENALOG    30 g    Apply topically twice a day as needed to affected area. Call to refill.    Rash and other nonspecific skin eruption

## 2018-05-30 NOTE — TELEPHONE ENCOUNTER
Reason for call:  Patient reporting a symptom    Symptom or request: right eye stye    Duration (how long have symptoms been present): this weekend    Have you been treated for this before? No    Additional comments: pt calling wondering if he should see the dr or go to the eye clinic. He's had these before and has gotten medication. Pt did schedule an appt with Smiley Plascencia if needed.    Phone Number patient can be reached at:  Home number on file [phone] (home)    Best Time:  any    Can we leave a detailed message on this number:  YES    Call taken on 5/30/2018 at 8:23 AM by Bia Yousif

## 2018-05-30 NOTE — NURSING NOTE
"Initial /78  Pulse 71  Temp 98.1  F (36.7  C) (Tympanic)  Wt 185 lb 9.6 oz (84.2 kg)  BMI 28.64 kg/m2 Estimated body mass index is 28.64 kg/(m^2) as calculated from the following:    Height as of 7/24/17: 5' 7.5\" (1.715 m).    Weight as of this encounter: 185 lb 9.6 oz (84.2 kg). .      "

## 2018-05-30 NOTE — TELEPHONE ENCOUNTER
"S-(situation): eye problem    B-(background): PCP Dr Diaz, last visit 07/24/17    A-(assessment): Patient states he thinks he has a stye on right lower eyelid. He states there is a \"Little white pocket on inner eyelid on the bottom\". Noticed a few days ago. Has been trying hot wash cloth. Has had similar symptoms in the past.    R-(recommendations): Keep appointment per Telephone Triage Protocols for Nurses 5th Edition     Riddhi GODOY RN      "

## 2018-06-30 ENCOUNTER — HOSPITAL ENCOUNTER (EMERGENCY)
Facility: CLINIC | Age: 73
Discharge: HOME | End: 2018-06-30
Attending: PHYSICIAN ASSISTANT | Admitting: PHYSICIAN ASSISTANT
Payer: MEDICARE

## 2018-06-30 VITALS
TEMPERATURE: 98.7 F | DIASTOLIC BLOOD PRESSURE: 81 MMHG | OXYGEN SATURATION: 97 % | SYSTOLIC BLOOD PRESSURE: 136 MMHG | RESPIRATION RATE: 16 BRPM

## 2018-06-30 DIAGNOSIS — L03.313 CELLULITIS OF CHEST WALL: Primary | ICD-10-CM

## 2018-06-30 PROCEDURE — 99213 OFFICE O/P EST LOW 20 MIN: CPT | Performed by: PHYSICIAN ASSISTANT

## 2018-06-30 PROCEDURE — G0463 HOSPITAL OUTPT CLINIC VISIT: HCPCS

## 2018-06-30 RX ORDER — CEPHALEXIN 500 MG/1
500 CAPSULE ORAL 4 TIMES DAILY
Qty: 56 CAPSULE | Refills: 0 | Status: SHIPPED | OUTPATIENT
Start: 2018-06-30 | End: 2018-07-14

## 2018-06-30 ASSESSMENT — ENCOUNTER SYMPTOMS
MUSCULOSKELETAL NEGATIVE: 1
GASTROINTESTINAL NEGATIVE: 1
CHILLS: 0
RESPIRATORY NEGATIVE: 1
CONSTITUTIONAL NEGATIVE: 1
FEVER: 0
CARDIOVASCULAR NEGATIVE: 1

## 2018-06-30 NOTE — ED AVS SNAPSHOT
[facility] Emergency Department    [address] [facility] [address]    Phone:  [phone]    Fax:  [phone]                                       Velasquez Del Rio   MRN: [id number]    Department:  [facility] Emergency Department   Date of Visit:  6/30/2018           After Visit Summary Signature Page     I have received my discharge instructions, and my questions have been answered. I have discussed any challenges I see with this plan with the nurse or doctor.    ..........................................................................................................................................  Patient/Patient Representative Signature      ..........................................................................................................................................  Patient Representative Print Name and Relationship to Patient    ..................................................               ................................................  Date                                            Time    ..........................................................................................................................................  Reviewed by Signature/Title    ...................................................              ..............................................  Date                                                            Time

## 2018-06-30 NOTE — DISCHARGE INSTRUCTIONS
Cellulitis  Cellulitis is an infection of the deep layers of skin. A break in the skin, such as a cut or scratch, can let bacteria under the skin. If the bacteria get to deep layers of the skin, it can be serious. If not treated, cellulitis can get into the bloodstream and lymph nodes. The infection can then spread throughout the body. This causes serious illness.  Cellulitis causes the affected skin to become red, swollen, warm, and sore. The reddened areas have a visible border. An open sore may leak fluid (pus). You may have a fever, chills, and pain.  Cellulitis is treated with antibiotics taken for 7 to 10 days. An open sore may be cleaned and covered with cool wet gauze. Symptoms should get better 1 to 2 days after treatment is started. Make sure to take all the antibiotics for the full number of days until they are gone. Keep taking the medicine even if your symptoms go away.  Home care  Follow these tips:    Limit the use of the part of your body with cellulitis.     If the infection is on your leg, keep your leg raised while sitting. This will help to reduce swelling.    Take all of the antibiotic medicine exactly as directed until it is gone. Do not miss any doses, especially during the first 7 days. Don t stop taking the medicine when your symptoms get better.    Keep the affected area clean and dry.    Wash your hands with soap and warm water before and after touching your skin. Anyone else who touches your skin should also wash his or her hands. Don't share towels.  Follow-up care  Follow up with your healthcare provider, or as advised. If your infection does not go away on the first antibiotic, your healthcare provider will prescribe a different one.  When to seek medical advice  Call your healthcare provider right away if any of these occur:    Red areas that spread    Swelling or pain that gets worse    Fluid leaking from the skin (pus)    Fever higher of 100.4  F (38.0  C) or higher after 2 days  on antibiotics  Date Last Reviewed: 9/1/2016 2000-2017 [employer] [address] [place] [address], [place] [address]. All rights reserved. This information is not intended as a substitute for professional medical care. Always follow your healthcare professional's instructions.

## 2018-06-30 NOTE — ED AVS SNAPSHOT
[facility] Emergency Department    [address] [facility] [address]    Phone:  [phone]    Fax:  [phone]                                       Velasquez Del Rio   MRN: [id number]    Department:  [facility] Emergency Department   Date of Visit:  6/30/2018           Patient Information     Date Of Birth          1945        Your diagnoses for this visit were:     Cellulitis of chest wall        You were seen by Nathaly Duque PA-C.      Follow-up Information     Follow up with Cecilio Diaz MD. Call in 5 days.    Specialty:  Family Practice    Why:  As needed, For persistent symptoms    Contact information:    [address] [place] [address]  [phone]          Follow up with [facility] Emergency Department.    Specialty:  EMERGENCY MEDICINE    Why:  As needed, If symptoms worsen    Contact information:    [address] [place] [id number] [phone]    Additional information:    The medical center is located at   [address] [place] [address] (between [facility] and   [place] [address] in [place], four miles north   of [place]).        Discharge Instructions         Cellulitis  Cellulitis is an infection of the deep layers of skin. A break in the skin, such as a cut or scratch, can let bacteria under the skin. If the bacteria get to deep layers of the skin, it can be serious. If not treated, cellulitis can get into the bloodstream and lymph nodes. The infection can then spread throughout the body. This causes serious illness.  Cellulitis causes the affected skin to become red, swollen, warm, and sore. The reddened areas have a visible border. An open sore may leak fluid (pus). You may have a fever, chills, and pain.  Cellulitis is treated with antibiotics taken for 7 to 10 days. An open sore may be cleaned and covered with cool wet gauze. Symptoms should get better 1 to 2 days after treatment is started. Make sure to take all the antibiotics for the full number of  days until they are gone. Keep taking the medicine even if your symptoms go away.  Home care  Follow these tips:    Limit the use of the part of your body with cellulitis.     If the infection is on your leg, keep your leg raised while sitting. This will help to reduce swelling.    Take all of the antibiotic medicine exactly as directed until it is gone. Do not miss any doses, especially during the first 7 days. Don t stop taking the medicine when your symptoms get better.    Keep the affected area clean and dry.    Wash your hands with soap and warm water before and after touching your skin. Anyone else who touches your skin should also wash his or her hands. Don't share towels.  Follow-up care  Follow up with your healthcare provider, or as advised. If your infection does not go away on the first antibiotic, your healthcare provider will prescribe a different one.  When to seek medical advice  Call your healthcare provider right away if any of these occur:    Red areas that spread    Swelling or pain that gets worse    Fluid leaking from the skin (pus)    Fever higher of 100.4  F (38.0  C) or higher after 2 days on antibiotics  Date Last Reviewed: 9/1/2016 2000-2017 [employer]. [address] [place] [address]. All rights reserved. This information is not intended as a substitute for professional medical care. Always follow your healthcare professional's instructions.          24 Hour Appointment Hotline       To make an appointment at any [facility], call [phone] ([phone]). If you don't have a family doctor or clinic, we will help you find one. [facility] clinics are conveniently located to serve the needs of you and your family.             Review of your medicines      START taking        Dose / Directions Last dose taken    cephALEXin 500 MG capsule   Commonly known as:  KEFLEX   Dose:  500 mg   Quantity:  56 capsule        Take 1 capsule (500 mg) by mouth 4 times  daily for 14 days   Refills:  0          Our records show that you are taking the medicines listed below. If these are incorrect, please call your family doctor or clinic.        Dose / Directions Last dose taken    aspirin 81 MG tablet        1 TABLET DAILY   Refills:  0        atenolol 25 MG tablet   Commonly known as:  TENORMIN   Dose:  25 mg   Quantity:  90 tablet        Take 1 tablet (25 mg) by mouth daily   Refills:  3        erythromycin ophthalmic ointment   Commonly known as:  ROMYCIN   Dose:  0.25 inch   Quantity:  1 g        Place 0.25 inches into the right eye 2 times daily   Refills:  0        fluticasone 50 MCG/ACT spray   Commonly known as:  FLONASE   Dose:  1-2 spray   Quantity:  16 g        Spray 1-2 sprays into both nostrils daily as needed for rhinitis   Refills:  11        lovastatin 40 MG tablet   Commonly known as:  MEVACOR   Dose:  40 mg   Quantity:  90 tablet        Take 1 tablet (40 mg) by mouth At Bedtime   Refills:  3        triamcinolone 0.5 % cream   Commonly known as:  KENALOG   Quantity:  30 g        Apply topically twice a day as needed to affected area. Call to refill.   Refills:  3                Prescriptions were sent or printed at these locations (1 Prescription)                   [facility] [address] [place] [address] [address] [place] [address]   [address] [facility] [address]    Telephone:  [phone]   Fax:  [phone]   Hours:                  E-Prescribed (1 of 1)         cephALEXin (KEFLEX) 500 MG capsule                Orders Needing Specimen Collection     None      Pending Results     No orders found from 6/28/2018 to 7/1/2018.            Pending Culture Results     No orders found from 6/28/2018 to 7/1/2018.            Pending Results Instructions     If you had any lab results that were not finalized at the time of your Discharge, you can call the ED Lab Result RN at [phone]. You will be contacted by this team for any positive Lab results or changes in  treatment. The nurses are available 7 days a week from 10A to 6:30P.  You can leave a message 24 hours per day and they will return your call.        Test Results From Your Hospital Stay               Thank you for choosing [facility]       Thank you for choosing [facility] for your care. Our goal is always to provide you with excellent care. Hearing back from our patients is one way we can continue to improve our services. Please take a few minutes to complete the written survey that you may receive in the mail after you visit with us. Thank you!        Care EveryWhere ID     This is your Care EveryWhere ID. This could be used by other organizations to access your [facility] medical records  [id number]        Equal Access to Services     STEPHON HAY : Ayan Carcamo, monster posey, nannette isaac, flavio maravilla . So [facility] [phone].    ATENCIÓN: Si habla español, tiene a bradley disposición servicios gratuitos de asistencia lingüística. Llame al [phone].    We comply with applicable federal civil rights laws and [place] laws. We do not discriminate on the basis of race, color, national origin, age, disability, sex, sexual orientation, or gender identity.            After Visit Summary       This is your record. Keep this with you and show to your community pharmacist(s) and doctor(s) at your next visit.

## 2018-06-30 NOTE — ED PROVIDER NOTES
History     Chief Complaint   Patient presents with     Rash     red area near left nipple     HPI  Velasquez Del Rio is a 72 year old male with history of hypertension who presents with complaints of area of redness, warmth, and pain adjacent to left nipple since yesterday.  Patient reports having 2 similar episodes over the past couple years.  Patient states about 2 years ago, he had a similar chest wall infection/mental status that eventually resolved with 2 subsequent courses of antibiotics (clindamycin followed by Augmentin).  Patient was then seen by surgery in order to rule out potential underlying breast malignancy.  He was instructed to finish his course of antibiotics as his symptoms were improving at that time.  Pt reports that he then had an episode to the right breast in February of this year while he was in [place].  Patient's symptoms improved with 21 days of Keflex during that episode.  Patient states he otherwise feels well.  He has not felt any underlying lump or bump of the breast tissue.  Denies any systemic symptoms.  Pt denies fevers, chills, body aches, headache, nausea, vomiting.      Problem List:    Patient Active Problem List    Diagnosis Date Noted     Chronic vasomotor rhinitis 07/24/2017     Priority: Medium     Essential hypertension with goal blood pressure less than 140/90 07/22/2016     Priority: Medium     Dermatitis 07/22/2016     Priority: Medium     Family history of malignant neoplasm of breast 05/19/2016     Priority: Medium     Sister, daughter, and 4 nieces.        Diverticulosis of large intestine 08/24/2015     Priority: Medium     Scope in 2011.       Family history of colon cancer 08/24/2015     Priority: Medium     Mother. Needs colonoscopy every 5 years.        Herpes zoster 07/30/2012     Priority: Medium     right lower extremity         Advanced directives, counseling/discussion 11/14/2011     Priority: Medium     Advance Care Planning:   Receipt of ACP document:   "Received: Health Care Directive which was witnessed or notarized on 2013.  Document not previously scanned.  Validation form completed and sent with document to be scanned.  Code Status reflects choices in most recent ACP document.  Confirmed/documented designated decision maker(s). See permanent comments section of demographics in clinical tab. View document(s) and details by clicking on code status.   Added by Diane Valera on 2014.          Pt does not have an Advance/Health Care Directive (HCD), given \"What is Advance Care Planning?\" flyer.           Hyperlipidemia LDL goal <130 10/31/2010     Priority: Medium     Health Care Home 2012     Priority: Low     Vi Koo RN-PHN  FPA / [facility]   [phone]    DX V65.8 REPLACED WITH 37919 HEALTH CARE HOME (2013)       Benign neoplasm of colon 2007     Priority: Low     Tubular adenoma-two noted on  colonoscopy. diverticulosis otherwise normal colonoscopy           Past Medical History:    History reviewed. No pertinent past medical history.    Past Surgical History:    Past Surgical History:   Procedure Laterality Date     COLONOSCOPY      negative      COLONOSCOPY  2011    Procedure:COLONOSCOPY; Colonoscopy; Surgeon:DARCIE FISHER; Location:[facility]     COLONOSCOPY N/A 8/10/2016    Procedure: COLONOSCOPY;  Surgeon: Morales Renteria MD;  Location: [facility]       Family History:    Family History   Problem Relation Age of Onset     Hypertension Mother      Cerebrovascular Disease Mother       of a stroke     Cancer - colorectal Mother      age 70s     Prostate Cancer Father      had prostate removed     Diabetes Maternal Grandmother      Cancer Brother      bladder cancer     Colon Cancer Daughter 53     colon cancer surger     Breast Cancer Daughter      Asthma No family hx of      C.A.D. No family hx of        Social History:  Marital Status:   [2]  Social History "   Substance Use Topics     Smoking status: Former Smoker     Packs/day: 1.00     Years: 7.00     Types: Cigarettes     Quit date: 11/13/1968     Smokeless tobacco: Never Used     Alcohol use Yes      Comment: minimal        Medications:      cephALEXin (KEFLEX) 500 MG capsule   ASPIRIN 81 MG OR TABS   atenolol (TENORMIN) 25 MG tablet   erythromycin (ROMYCIN) ophthalmic ointment   fluticasone (FLONASE) 50 MCG/ACT spray   lovastatin (MEVACOR) 40 MG tablet   triamcinolone (KENALOG) 0.5 % cream         Review of Systems   Constitutional: Negative.  Negative for chills and fever.   Respiratory: Negative.    Cardiovascular: Negative.    Gastrointestinal: Negative.    Musculoskeletal: Negative.    Skin:        Redness to left chest wall adjacent to nipple   All other systems reviewed and are negative.      Physical Exam   BP: 143/84  Heart Rate: 74  Temp: 98.7  F (37.1  C)  Resp: 16  SpO2: 97 %      Physical Exam   Constitutional: He appears well-developed and well-nourished. No distress.   HENT:   Head: Normocephalic and atraumatic.   Pulmonary/Chest: Effort normal. Left breast exhibits skin change and tenderness. Left breast exhibits no inverted nipple, no mass and no nipple discharge. Breasts are symmetrical.   Small wedge-shaped area of erythema, warmth, and tenderness to left chest wall/breast area adjacent to the nipple at about the 12 o'clock position.  No drainage or induration.  No fluctuance or underlying lump.     Neurological: He is alert.   Skin: Skin is warm and dry.       ED Course     ED Course     Procedures    No results found for this or any previous visit (from the past 24 hour(s)).    Medications - No data to display    Assessments & Plan (with Medical Decision Making)     Pt is a 72 year old male with history of hypertension who presents with complaints of area of redness, warmth, and pain adjacent to left nipple since yesterday.  Patient reports having 2 similar episodes over the past couple years.   Patient states about 2 years ago, he had a similar chest wall infection/mental status that eventually resolved with 2 subsequent courses of antibiotics (clindamycin followed by Augmentin).  Patient was then seen by surgery in order to rule out potential underlying breast malignancy.  He was instructed to finish his course of antibiotics as his symptoms were improving at that time.  Pt reports that he then had an episode to the right breast in February of this year while he was in [place].  Patient's symptoms improved with 21 days of Keflex during that episode.  Patient states he otherwise feels well.  No systemic symptoms.  Pt is afebrile on arrival.  Exam as above.  Return precautions were reviewed.  Hand-outs were provided.    Patient was sent with Keflex and was instructed to follow-up with PCP in 5-7 days for continued care and management (especially given his recurrent infections of the breast area) or sooner if new or worsening symptoms.  He is to return to the ED for persistent and/or worsening symptoms.  Patient expressed understanding of the diagnosis and plan and was discharged home in good condition.    I have reviewed the nursing notes.    I have reviewed the findings, diagnosis, plan and need for follow up with the patient.    New Prescriptions    CEPHALEXIN (KEFLEX) 500 MG CAPSULE    Take 1 capsule (500 mg) by mouth 4 times daily for 14 days       Final diagnoses:   Cellulitis of chest wall       6/30/2018   [facility] EMERGENCY DEPARTMENT      Disclaimer:  This note consists of symbols derived from keyboarding, dictation and/or voice recognition software.  As a result, there may be errors in the script that have gone undetected.  Please consider this when interpreting information found in this chart.     Nathaly Duque PA-C  06/30/18 [id number]

## 2018-07-27 ENCOUNTER — OFFICE VISIT (OUTPATIENT)
Dept: FAMILY MEDICINE | Facility: CLINIC | Age: 73
End: 2018-07-27
Payer: COMMERCIAL

## 2018-07-27 VITALS
RESPIRATION RATE: 16 BRPM | SYSTOLIC BLOOD PRESSURE: 132 MMHG | TEMPERATURE: 97 F | DIASTOLIC BLOOD PRESSURE: 80 MMHG | WEIGHT: 180 LBS | HEIGHT: 68 IN | HEART RATE: 56 BPM | BODY MASS INDEX: 27.28 KG/M2

## 2018-07-27 DIAGNOSIS — I10 ESSENTIAL HYPERTENSION, BENIGN: ICD-10-CM

## 2018-07-27 DIAGNOSIS — J30.0 CHRONIC VASOMOTOR RHINITIS: ICD-10-CM

## 2018-07-27 DIAGNOSIS — R21 RASH AND OTHER NONSPECIFIC SKIN ERUPTION: ICD-10-CM

## 2018-07-27 DIAGNOSIS — E78.5 HYPERLIPIDEMIA LDL GOAL <130: ICD-10-CM

## 2018-07-27 DIAGNOSIS — Z00.00 ROUTINE HISTORY AND PHYSICAL EXAMINATION OF ADULT: Primary | ICD-10-CM

## 2018-07-27 LAB
ALT SERPL W P-5'-P-CCNC: 34 U/L (ref 0–70)
CHOLEST SERPL-MCNC: 150 MG/DL
HDLC SERPL-MCNC: 47 MG/DL
LDLC SERPL CALC-MCNC: 73 MG/DL
NONHDLC SERPL-MCNC: 103 MG/DL
TRIGL SERPL-MCNC: 151 MG/DL

## 2018-07-27 PROCEDURE — 99397 PER PM REEVAL EST PAT 65+ YR: CPT | Performed by: FAMILY MEDICINE

## 2018-07-27 PROCEDURE — 80061 LIPID PANEL: CPT | Performed by: FAMILY MEDICINE

## 2018-07-27 PROCEDURE — 99213 OFFICE O/P EST LOW 20 MIN: CPT | Mod: 25 | Performed by: FAMILY MEDICINE

## 2018-07-27 PROCEDURE — 36415 COLL VENOUS BLD VENIPUNCTURE: CPT | Performed by: FAMILY MEDICINE

## 2018-07-27 PROCEDURE — 84460 ALANINE AMINO (ALT) (SGPT): CPT | Performed by: FAMILY MEDICINE

## 2018-07-27 RX ORDER — FLUTICASONE PROPIONATE 50 MCG
1-2 SPRAY, SUSPENSION (ML) NASAL DAILY PRN
Qty: 32 BOTTLE | Refills: 11 | Status: SHIPPED | OUTPATIENT
Start: 2018-07-27 | End: 2019-08-01

## 2018-07-27 RX ORDER — ATENOLOL 25 MG/1
25 TABLET ORAL DAILY
Qty: 90 TABLET | Refills: 3 | Status: SHIPPED | OUTPATIENT
Start: 2018-07-27 | End: 2019-08-01

## 2018-07-27 RX ORDER — TRIAMCINOLONE ACETONIDE 5 MG/G
CREAM TOPICAL
Qty: 30 G | Refills: 3 | Status: SHIPPED | OUTPATIENT
Start: 2018-07-27 | End: 2019-08-01

## 2018-07-27 RX ORDER — LOVASTATIN 40 MG
40 TABLET ORAL AT BEDTIME
Qty: 90 TABLET | Refills: 3 | Status: SHIPPED | OUTPATIENT
Start: 2018-07-27 | End: 2019-08-01

## 2018-07-27 NOTE — PATIENT INSTRUCTIONS
Preventive Health Recommendations:   Male Ages 65 and over    Yearly exam:             See your health care provider every year in order to  o   Review health changes.   o   Discuss preventive care.    o   Review your medicines if your doctor has prescribed any.    Talk with your health care provider about whether you should have a test to screen for prostate cancer (PSA).    Every 3 years, have a diabetes test (fasting glucose). If you are at risk for diabetes, you should have this test more often.    Every 5 years, have a cholesterol test. Have this test more often if you are at risk for high cholesterol or heart disease.     Every 10 years, have a colonoscopy. Or, have a yearly FIT test (stool test). These exams will check for colon cancer.    Talk to with your health care provider about screening for Abdominal Aortic Aneurysm if you have a family history of AAA or have a history of smoking.    Shots:     Get a flu shot each year.     Get a tetanus shot every 10 years.     Talk to your doctor about your pneumonia vaccines. There are now two you should receive - Pneumovax (PPSV 23) and Prevnar (PCV 13).     Talk to your pharmacist about a shingles vaccine.     Talk to your doctor about the hepatitis B vaccine.  Nutrition:     Eat at least 5 servings of fruits and vegetables each day.     Eat whole-grain bread, whole-wheat pasta and brown rice instead of white grains and rice.     Get adequate Calcium and Vitamin D.   Lifestyle    Exercise for at least 150 minutes a week (30 minutes a day, 5 days a week). This will help you control your weight and prevent disease.     Limit alcohol to one drink per day.     No smoking.     Wear sunscreen to prevent skin cancer.     See your dentist every six months for an exam and cleaning.     See your eye doctor every 1 to 2 years to screen for conditions such as glaucoma, macular degeneration, cataracts, etc     ASSESSMENT / PLAN:   1. Routine history and physical examination  "of adult  End of Life Planning:  Patient currently has an advanced directive: Yes.  Practitioner is supportive of decision.    COUNSELING:  Reviewed preventive health counseling, as reflected in patient instructions       Regular exercise       Healthy diet/nutrition       Vision screening       Hearing screening    BP Readings from Last 1 Encounters:   07/27/18 132/80     Estimated body mass index is 27.17 kg/(m^2) as calculated from the following:    Height as of this encounter: 5' 8.25\" (1.734 m).    Weight as of this encounter: 180 lb (81.6 kg).   reports that he quit smoking about 49 years ago. His smoking use included Cigarettes. He has a 7.00 pack-year smoking history. He has never used smokeless tobacco.  Appropriate preventive services were discussed with this patient, including applicable screening as appropriate for cardiovascular disease, diabetes, osteopenia/osteoporosis, and glaucoma.  As appropriate for age/gender, discussed screening for colorectal cancer, prostate cancer, breast cancer, and cervical cancer. Checklist reviewing preventive services available has been given to the patient.  Reviewed patients plan of care and provided an AVS. The Basic Care Plan (routine screening as documented in Health Maintenance) for Velasquez meets the Care Plan requirement. This Care Plan has been established and reviewed with the Patient.  Counseling Resources:  ATP IV Guidelines  Pooled Cohorts Equation Calculator  Breast Cancer Risk Calculator  FRAX Risk Assessment  ICSI Preventive Guidelines  Dietary Guidelines for Americans, 2010  USDA's MyPlate  ASA Prophylaxis  Lung CA Screening  For the bladder issue of having the muscle relax to allow urination, avoid allergy and cold medication. Ask the pharmacist about this.   If this is worsening then call [phone] for the Urology dept.   For the skin infections of the chest, use your own towels and wash cloths. Use the antibacterial soap a few times a week. Consider the " OTC Bacitracin ointment three times daily.   If this is not effective then an oral antibiotic may be needed. Do not use the steroid cream on this. Sometimes soaps and lotions can irritate the skin to start this. Use hypoallergenic products.     (E78.5) Hyperlipidemia LDL goal <130  Comment:   Plan: ALT, Lipid panel reflex to direct LDL Fasting,         lovastatin (MEVACOR) 40 MG tablet        Do the fasting labs today. We will call the results. Use the med daily and the lower chol diet.     (I10) Essential hypertension, benign  Comment:   Plan: atenolol (TENORMIN) 25 MG tablet        Stay on the med and use the non drug therapies. Monitor and record the BP at rest and the goal for the average is under 130/80.     (R21) Rash and other nonspecific skin eruption  Comment:   Plan: triamcinolone (KENALOG) 0.5 % cream        Use as directed and refills are done.     (J30.0) Chronic vasomotor rhinitis  Comment:   Plan: fluticasone (FLONASE) 50 MCG/ACT spray        Instructions given on diagnoses and refills are done for one year.

## 2018-07-27 NOTE — LETTER
July 27, 2018      Velasquez Del Rio  [address] [facility] [address]        Dear ,    We are writing to inform you of your test results.    Your labs are normal. Triglycerides are fine.    Resulted Orders   ALT   Result Value Ref Range    ALT 34 0 - 70 U/L   Lipid panel reflex to direct LDL Fasting   Result Value Ref Range    Cholesterol 150 <200 mg/dL    Triglycerides 151 (H) <150 mg/dL      Comment:      Borderline high:  150-199 mg/dl  High:             200-499 mg/dl  Very high:       >499 mg/dl  Fasting specimen      HDL Cholesterol 47 >39 mg/dL    LDL Cholesterol Calculated 73 <100 mg/dL      Comment:      Desirable:       <100 mg/dl    Non HDL Cholesterol 103 <130 mg/dL       If you have any questions or concerns, please call the clinic at the number listed above.       Sincerely,        Cecilio Diaz MD/nelas

## 2018-07-27 NOTE — MR AVS SNAPSHOT
After Visit Summary   7/27/2018    Velasquez Del Rio    MRN: [id number]           Patient Information     Date Of Birth          1945        Visit Information        Provider Department      7/27/2018 9:20 AM Cecilio Diaz MD [facility]        Today's Diagnoses     Routine history and physical examination of adult    -  1    Hyperlipidemia LDL goal <130        Essential hypertension, benign        Rash and other nonspecific skin eruption        Chronic vasomotor rhinitis          Care Instructions      Preventive Health Recommendations:   Male Ages 65 and over    Yearly exam:             See your health care provider every year in order to  o   Review health changes.   o   Discuss preventive care.    o   Review your medicines if your doctor has prescribed any.    Talk with your health care provider about whether you should have a test to screen for prostate cancer (PSA).    Every 3 years, have a diabetes test (fasting glucose). If you are at risk for diabetes, you should have this test more often.    Every 5 years, have a cholesterol test. Have this test more often if you are at risk for high cholesterol or heart disease.     Every 10 years, have a colonoscopy. Or, have a yearly FIT test (stool test). These exams will check for colon cancer.    Talk to with your health care provider about screening for Abdominal Aortic Aneurysm if you have a family history of AAA or have a history of smoking.    Shots:     Get a flu shot each year.     Get a tetanus shot every 10 years.     Talk to your doctor about your pneumonia vaccines. There are now two you should receive - Pneumovax (PPSV 23) and Prevnar (PCV 13).     Talk to your pharmacist about a shingles vaccine.     Talk to your doctor about the hepatitis B vaccine.  Nutrition:     Eat at least 5 servings of fruits and vegetables each day.     Eat whole-grain bread, whole-wheat pasta and brown rice instead of white grains and rice.  "    Get adequate Calcium and Vitamin D.   Lifestyle    Exercise for at least 150 minutes a week (30 minutes a day, 5 days a week). This will help you control your weight and prevent disease.     Limit alcohol to one drink per day.     No smoking.     Wear sunscreen to prevent skin cancer.     See your dentist every six months for an exam and cleaning.     See your eye doctor every 1 to 2 years to screen for conditions such as glaucoma, macular degeneration, cataracts, etc     ASSESSMENT / PLAN:   1. Routine history and physical examination of adult  End of Life Planning:  Patient currently has an advanced directive: Yes.  Practitioner is supportive of decision.    COUNSELING:  Reviewed preventive health counseling, as reflected in patient instructions       Regular exercise       Healthy diet/nutrition       Vision screening       Hearing screening    BP Readings from Last 1 Encounters:   07/27/18 132/80     Estimated body mass index is 27.17 kg/(m^2) as calculated from the following:    Height as of this encounter: 5' 8.25\" (1.734 m).    Weight as of this encounter: 180 lb (81.6 kg).   reports that he quit smoking about 49 years ago. His smoking use included Cigarettes. He has a 7.00 pack-year smoking history. He has never used smokeless tobacco.  Appropriate preventive services were discussed with this patient, including applicable screening as appropriate for cardiovascular disease, diabetes, osteopenia/osteoporosis, and glaucoma.  As appropriate for age/gender, discussed screening for colorectal cancer, prostate cancer, breast cancer, and cervical cancer. Checklist reviewing preventive services available has been given to the patient.  Reviewed patients plan of care and provided an AVS. The Basic Care Plan (routine screening as documented in Health Maintenance) for Velasquez meets the Care Plan requirement. This Care Plan has been established and reviewed with the Patient.  Counseling Resources:  ATP IV " Guidelines  Pooled Cohorts Equation Calculator  Breast Cancer Risk Calculator  FRAX Risk Assessment  ICSI Preventive Guidelines  Dietary Guidelines for Americans, 2010  USDA's MyPlate  ASA Prophylaxis  Lung CA Screening  For the bladder issue of having the muscle relax to allow urination, avoid allergy and cold medication. Ask the pharmacist about this.   If this is worsening then call [phone] for the Urology dept.   For the skin infections of the chest, use your own towels and wash cloths. Use the antibacterial soap a few times a week. Consider the OTC Bacitracin ointment three times daily.   If this is not effective then an oral antibiotic may be needed. Do not use the steroid cream on this. Sometimes soaps and lotions can irritate the skin to start this. Use hypoallergenic products.     (E78.5) Hyperlipidemia LDL goal <130  Comment:   Plan: ALT, Lipid panel reflex to direct LDL Fasting,         lovastatin (MEVACOR) 40 MG tablet        Do the fasting labs today. We will call the results. Use the med daily and the lower chol diet.     (I10) Essential hypertension, benign  Comment:   Plan: atenolol (TENORMIN) 25 MG tablet        Stay on the med and use the non drug therapies. Monitor and record the BP at rest and the goal for the average is under 130/80.     (R21) Rash and other nonspecific skin eruption  Comment:   Plan: triamcinolone (KENALOG) 0.5 % cream        Use as directed and refills are done.     (J30.0) Chronic vasomotor rhinitis  Comment:   Plan: fluticasone (FLONASE) 50 MCG/ACT spray        Instructions given on diagnoses and refills are done for one year.           Follow-ups after your visit        Who to contact     If you have questions or need follow up information about today's clinic visit or your schedule please contact [facility] directly at [phone].  Normal or non-critical lab and imaging results will be communicated to you by MyChart, letter or phone within 4 business days  "after the clinic has received the results. If you do not hear from us within 7 days, please contact the clinic through [email or web address] or phone. If you have a critical or abnormal lab result, we will notify you by phone as soon as possible.  Submit refill requests through VideoStep or call your pharmacy and they will forward the refill request to us. Please allow 3 business days for your refill to be completed.          Additional Information About Your Visit        Care EveryWhere ID     This is your Care EveryWhere ID. This could be used by other organizations to access your [place] medical records  [id number]        Your Vitals Were     Pulse Temperature Respirations Height BMI (Body Mass Index)       56 97  F (36.1  C) (Tympanic) 16 5' 8.25\" (1.734 m) 27.17 kg/m2        Blood Pressure from Last 3 Encounters:   07/27/18 132/80   06/30/18 136/81   05/30/18 125/78    Weight from Last 3 Encounters:   07/27/18 180 lb (81.6 kg)   05/30/18 185 lb 9.6 oz (84.2 kg)   07/24/17 180 lb 2 oz (81.7 kg)              We Performed the Following     ALT     Lipid panel reflex to direct LDL Fasting          Where to get your medicines      These medications were sent to [facility] - [facility] [address]  [address] [facility] [address]     Phone:  [phone]     atenolol 25 MG tablet    fluticasone 50 MCG/ACT spray    lovastatin 40 MG tablet    triamcinolone 0.5 % cream          Primary Care Provider Office Phone # Fax #    Cecilio Diaz [phone] [address] [facility] [address]        Equal Access to Services     HODAN [facility] : Hadii july Carcamo, waaida kalpesh, qaybta kaalflavio loaiza. So [facility] [phone].    ATENCIÓN: Si habla español, tiene a braldey disposición servicios gratuitos de asistencia lingüística. Llame al [phone].    We comply with applicable federal civil rights laws and [place] " laws. We do not discriminate on the basis of race, color, national origin, age, disability, sex, sexual orientation, or gender identity.            Thank you!     Thank you for choosing [facility]  for your care. Our goal is always to provide you with excellent care. Hearing back from our patients is one way we can continue to improve our services. Please take a few minutes to complete the written survey that you may receive in the mail after your visit with us. Thank you!             Your Updated Medication List - Protect others around you: Learn how to safely use, store and throw away your medicines at www.disposemymeds.org.          This list is accurate as of 7/27/18 10:11 AM.  Always use your most recent med list.                   Brand Name Dispense Instructions for use Diagnosis    aspirin 81 MG tablet      1 TABLET DAILY    Essential hypertension, benign       atenolol 25 MG tablet    TENORMIN    90 tablet    Take 1 tablet (25 mg) by mouth daily    Essential hypertension, benign       erythromycin ophthalmic ointment    ROMYCIN    1 g    Place 0.25 inches into the right eye 2 times daily    Hordeolum externum of right lower eyelid, Swelling of right eyelid       fluticasone 50 MCG/ACT spray    FLONASE    32 Bottle    Spray 1-2 sprays into both nostrils daily as needed for rhinitis    Chronic vasomotor rhinitis       lovastatin 40 MG tablet    MEVACOR    90 tablet    Take 1 tablet (40 mg) by mouth At Bedtime    Hyperlipidemia LDL goal <130       triamcinolone 0.5 % cream    KENALOG    30 g    Apply topically twice a day as needed to affected area. Call to refill.    Rash and other nonspecific skin eruption

## 2018-07-27 NOTE — PROGRESS NOTES
SUBJECTIVE:   Velasquez Del Rio is a 72 year old male who presents for Preventive Visit.  Are you in the first 12 months of your Medicare Part B coverage?  No    Healthy Habits:    Do you get at least three servings of calcium containing foods daily (dairy, green leafy vegetables, etc.)? yes    Amount of exercise or daily activities, outside of work: 5 day(s) per week    Problems taking medications regularly No    Medication side effects: No    Have you had an eye exam in the past two years? yes    Do you see a dentist twice per year? yes    Do you have sleep apnea, excessive snoring or daytime drowsiness?no      Ability to successfully perform activities of daily living: Yes, no assistance needed    Home safety:  lack of grab bars in the bathroom     Hearing impairment: No    Fall risk:  Fallen 2 or more times in the past year?: No  Any fall with injury in the past year?: No        COGNITIVE SCREEN  1) Repeat 3 items (Leader, Season, Table)    2) Clock draw: ABNORMAL one line was wrong  3) 3 item recall: Recalls 3 objects  Results: ABNORMAL clock, 1-2 items recalled: PROBABLE COGNITIVE IMPAIRMENT, **INFORM PROVIDER**    Mini-CogTM Copyright BERNIE Keith. Licensed by the author for use in [facility]; reprinted with permission (wilbur@[facility]). All rights reserved.        Reviewed and updated as needed this visit by clinical staff  Tobacco  Allergies  Meds  Med Hx  Surg Hx  Fam Hx  Soc Hx        Reviewed and updated as needed this visit by Provider        Social History   Substance Use Topics     Smoking status: Former Smoker     Packs/day: 1.00     Years: 7.00     Types: Cigarettes     Quit date: 11/13/1968     Smokeless tobacco: Never Used     Alcohol use Yes      Comment: minimal       If you drink alcohol do you typically have >3 drinks per day or >7 drinks per week? Not Applicable                        Today's PHQ-2 Score:   PHQ-2 ( 1999 Pfizer) 7/27/2018 7/24/2017   Q1: Little interest or pleasure  in doing things 0 0   Q2: Feeling down, depressed or hopeless 0 0   PHQ-2 Score 0 0       Current Outpatient Prescriptions:      ASPIRIN 81 MG OR TABS, 1 TABLET DAILY, Disp: , Rfl:      atenolol (TENORMIN) 25 MG tablet, Take 1 tablet (25 mg) by mouth daily, Disp: 90 tablet, Rfl: 3     fluticasone (FLONASE) 50 MCG/ACT spray, Spray 1-2 sprays into both nostrils daily as needed for rhinitis, Disp: 16 g, Rfl: 11     lovastatin (MEVACOR) 40 MG tablet, Take 1 tablet (40 mg) by mouth At Bedtime, Disp: 90 tablet, Rfl: 3     triamcinolone (KENALOG) 0.5 % cream, Apply topically twice a day as needed to affected area. Call to refill., Disp: 30 g, Rfl: 3     erythromycin (ROMYCIN) ophthalmic ointment, Place 0.25 inches into the right eye 2 times daily (Patient not taking: Reported on 7/27/2018), Disp: 1 g, Rfl: 0    Patient Active Problem List   Diagnosis     Benign neoplasm of colon     Hyperlipidemia LDL goal <130     Advanced directives, counseling/discussion     Health Care Home     Herpes zoster     Diverticulosis of large intestine     Family history of colon cancer     Family history of malignant neoplasm of breast     Essential hypertension with goal blood pressure less than 140/90     Dermatitis     Chronic vasomotor rhinitis     Do you feel safe in your environment - Yes    Do you have a Health Care Directive?: Yes: Advance Directive has been received and scanned.    Current providers sharing in care for this patient include:   Patient Care Team:  Cecilio Diaz MD as PCP - General (Family Practice)    The following health maintenance items are reviewed in Epic and correct as of today:  Health Maintenance   Topic Date Due     BMP Q1 YR  11/18/2014     PSA Q1 YR  11/18/2014     ADVANCE DIRECTIVE PLANNING Q5 YRS  11/14/2016     ALT Q1 YR  07/24/2018     LIPID MONITORING Q1 YEAR  07/24/2018     FALL RISK ASSESSMENT  07/24/2018     PHQ-2 Q1 YR  07/24/2018     INFLUENZA VACCINE (1) 09/01/2018     COLONOSCOPY Q3 YR   "08/10/2019     TETANUS IMMUNIZATION (SYSTEM ASSIGNED)  09/24/2022     PNEUMOCOCCAL  Completed     AORTIC ANEURYSM SCREENING (SYSTEM ASSIGNED)  Completed       ROS:  CONSTITUTIONAL: NEGATIVE for fever, chills, change in weight  INTEGUMENTARY/SKIN: NEGATIVE for worrisome rashes, moles or lesions  EYES: NEGATIVE for vision changes or irritation  ENT/MOUTH: NEGATIVE for ear, mouth and throat problems  RESP: NEGATIVE for significant cough or SOB  BREAST: NEGATIVE for masses, tenderness or discharge  CV: NEGATIVE for chest pain, palpitations or peripheral edema  GI: NEGATIVE for nausea, abdominal pain, heartburn, or change in bowel habits  : NEGATIVE for frequency, dysuria, or hematuria   male : after sitting or laying he can have urinary hesitancy for a minute or so.   MUSCULOSKELETAL: NEGATIVE for significant arthralgias or myalgia  NEURO: NEGATIVE for weakness, dizziness or paresthesias  ENDOCRINE: NEGATIVE for temperature intolerance, skin/hair changes  HEME: NEGATIVE for bleeding problems  PSYCHIATRIC: NEGATIVE for changes in mood or affect    OBJECTIVE:   /80  Pulse 56  Temp 97  F (36.1  C) (Tympanic)  Resp 16  Ht 5' 8.25\" (1.734 m)  Wt 180 lb (81.6 kg)  BMI 27.17 kg/m2 Estimated body mass index is 27.17 kg/(m^2) as calculated from the following:    Height as of this encounter: 5' 8.25\" (1.734 m).    Weight as of this encounter: 180 lb (81.6 kg).  EXAM:   GENERAL APPEARANCE: healthy, alert and no distress  EYES: EOMI,  PERRL  HENT: ear canals and TM's normal and nose and mouth without ulcers or lesions  NECK: no adenopathy, no asymmetry, masses, or scars and thyroid normal to palpation  RESP: lungs clear to auscultation - no rales, rhonchi or wheezes  CV: regular rates and rhythm, normal S1 S2, no S3 or S4 and no murmur, click or rub -  ABDOMEN:  soft, nontender, no HSM or masses and bowel sounds normal  GU_male: testicles normal without atrophy or masses, no hernias and penis normal without urethral " "discharge  MS: extremities normal- no gross deformities noted, no evidence of inflammation in joints, FROM in all extremities.  SKIN: no suspicious lesions or rashes  NEURO: Normal strength and tone, sensory exam grossly normal, mentation intact and speech normal  PSYCH: mentation appears normal and affect normal/bright  LYMPHATICS: No axillary, cervical, inguinal, or supraclavicular nodes          ASSESSMENT / PLAN:   1. Routine history and physical examination of adult  End of Life Planning:  Patient currently has an advanced directive: Yes.  Practitioner is supportive of decision.    COUNSELING:  Reviewed preventive health counseling, as reflected in patient instructions       Regular exercise       Healthy diet/nutrition       Vision screening       Hearing screening    BP Readings from Last 1 Encounters:   07/27/18 132/80     Estimated body mass index is 27.17 kg/(m^2) as calculated from the following:    Height as of this encounter: 5' 8.25\" (1.734 m).    Weight as of this encounter: 180 lb (81.6 kg).   reports that he quit smoking about 49 years ago. His smoking use included Cigarettes. He has a 7.00 pack-year smoking history. He has never used smokeless tobacco.  Appropriate preventive services were discussed with this patient, including applicable screening as appropriate for cardiovascular disease, diabetes, osteopenia/osteoporosis, and glaucoma.  As appropriate for age/gender, discussed screening for colorectal cancer, prostate cancer, breast cancer, and cervical cancer. Checklist reviewing preventive services available has been given to the patient.  Reviewed patients plan of care and provided an AVS. The Basic Care Plan (routine screening as documented in Health Maintenance) for Velasquez meets the Care Plan requirement. This Care Plan has been established and reviewed with the Patient.  Counseling Resources:  ATP IV Guidelines  Pooled Cohorts Equation Calculator  Breast Cancer Risk Calculator  FRAX Risk " Assessment  ICSI Preventive Guidelines  Dietary Guidelines for Americans, 2010  USDA's MyPlate  ASA Prophylaxis  Lung CA Screening  For the bladder issue of having the muscle relax to allow urination, avoid allergy and cold medication. Ask the pharmacist about this.   If this is worsening then call [phone] for the Urology dept.   For the skin infections of the chest, use your own towels and wash cloths. Use the antibacterial soap a few times a week. Consider the OTC Bacitracin ointment three times daily.   If this is not effective then an oral antibiotic may be needed. Do not use the steroid cream on this. Sometimes soaps and lotions can irritate the skin to start this. Use hypoallergenic products.     (E78.5) Hyperlipidemia LDL goal <130  Comment:   Plan: ALT, Lipid panel reflex to direct LDL Fasting,         lovastatin (MEVACOR) 40 MG tablet        Do the fasting labs today. We will call the results. Use the med daily and the lower chol diet.     (I10) Essential hypertension, benign  Comment:   Plan: atenolol (TENORMIN) 25 MG tablet        Stay on the med and use the non drug therapies. Monitor and record the BP at rest and the goal for the average is under 130/80.     (R21) Rash and other nonspecific skin eruption  Comment:   Plan: triamcinolone (KENALOG) 0.5 % cream        Use as directed and refills are done.     (J30.0) Chronic vasomotor rhinitis  Comment:   Plan: fluticasone (FLONASE) 50 MCG/ACT spray        Instructions given on diagnoses and refills are done for one year.         Cecilio Diaz MD  [facility]

## 2018-09-12 ENCOUNTER — HOSPITAL ENCOUNTER (OUTPATIENT)
Dept: ULTRASOUND IMAGING | Facility: CLINIC | Age: 73
Discharge: HOME | End: 2018-09-12
Attending: INTERNAL MEDICINE | Admitting: INTERNAL MEDICINE
Payer: MEDICARE

## 2018-09-12 ENCOUNTER — OFFICE VISIT (OUTPATIENT)
Dept: FAMILY MEDICINE | Facility: CLINIC | Age: 73
End: 2018-09-12
Payer: COMMERCIAL

## 2018-09-12 VITALS
OXYGEN SATURATION: 95 % | HEART RATE: 76 BPM | WEIGHT: 183.8 LBS | SYSTOLIC BLOOD PRESSURE: 146 MMHG | TEMPERATURE: 98.7 F | DIASTOLIC BLOOD PRESSURE: 82 MMHG | BODY MASS INDEX: 27.74 KG/M2

## 2018-09-12 DIAGNOSIS — R10.31 RIGHT GROIN PAIN: Primary | ICD-10-CM

## 2018-09-12 DIAGNOSIS — R10.31 RIGHT GROIN PAIN: ICD-10-CM

## 2018-09-12 PROCEDURE — 99213 OFFICE O/P EST LOW 20 MIN: CPT | Performed by: INTERNAL MEDICINE

## 2018-09-12 PROCEDURE — 76857 US EXAM PELVIC LIMITED: CPT

## 2018-09-12 NOTE — MR AVS SNAPSHOT
After Visit Summary   9/12/2018    Velasquez Del Rio    MRN: [id number]           Patient Information     Date Of Birth          1945        Visit Information        Provider Department      9/12/2018 11:00 AM Merritt Hopkins MD [facility]        Today's Diagnoses     Right groin pain    -  1      Care Instructions      Hernia (Adult)    A hernia can happen when there is a weakness or defect in the wall of the abdomen or groin. Intestines or nearby tissues may move from their usual location and push through the weakness in the wall. This can cause a hernia (bulge) you may see or feel.  Causes and risk factors   A hernia may be present at birth. Or it may be caused by the wear and tear of daily living. Certain factors can make a hernia more likely. These can include:    Heavy lifting    Straining, whether from lifting, movement, or constipation    Chronic cough    Injury to the abdominal wall    Excess weight    Pregnancy    Prior surgery    Older age    Family history of hernia  Symptoms  Symptoms of a hernia may come on suddenly. Or they may appear slowly over time. Some common symptoms include:    Bulge in the groin area, around the navel, or in the scrotum (the bulge may get bigger when you stand and go away when you lie down)    Pain or pressure around the bulge    Pain during activities such as lifting, coughing, or sneezing    A feeling of weakness or pressure in the groin    Pain or swelling in the scrotum  Types of hernias  There are different types of hernia. The type you have depends on its location:    Inguinal. This type is in the groin or scrotum. It is more common in men.    Femoral. This type is in the groin, upper thigh (where the leg bends), or labia. It is more common in women.    Ventral. This type is in the abdominal wall.    Umbilical. This type occurs around the navel (belly button).    Incisional. This type occurs at the site of a previous surgery.  The condition of  the hernia can help determine how urgently it needs to be treated.    Reducible. It goes back in by itself, or it can be pushed back in.    Irreducible. It can t be pushed back in.    Incarcerated/strangulated. The intestine is trapped (incarcerated). If this happens, you won t be able to push the bulge back in. If the incarcerated hernia isn t treated, it may become strangulated. This means the area loses blood supply and the tissue may die. This requires emergency surgery. You need treatment right away.  In most cases, a hernia will not heal on its own. Surgery is usually needed to repair the defect in the abdominal wall or groin. You ll be told more about surgery, if needed.  If your symptoms are not severe, treatment may sometimes be delayed. In such cases, regular follow-up visits with the provider will be needed. You ll be asked to keep track of your symptoms and to watch for signs of more serious problems. You may also be given guidelines similar to the home care instructions below.  Home care  To help keep a hernia from getting worse, you may be advised to:    Avoid heavy lifting and straining as directed.    Take steps to prevent constipation, such as eating more fiber and drinking more water. This may help reduce straining that can occur when having a bowel movement. Reducing straining may help keep your symptoms from getting worse.    Maintain a healthy weight or lose excess weight. This can help reduce strain on abdominal muscles and tissues.    Stop smoking. This can help prevent coughing that may also strain abdominal muscles and tissues.  Follow-up care  Follow up with your healthcare provider, or as directed. If imaging tests were done, they will be reviewed a doctor. You will be told the results and any new findings that may affect your care.  When to seek medical advice  Call your healthcare provider right away if any of these occur:    Hernia hardens, swells, or grows larger    Hernia can no  longer be pushed back in    Pain moves to the lower right abdomen (just below the waistline), or spreads to the back  Call 911  Call 911 if any of these occur:    Nausea and vomiting    Severe pain, redness, or tenderness in the area near the hernia    Pain worsens quickly and doesn t get better    Inability to have a bowel movement or pass gas    Fever of 100.4 F (38 C) or higher, or as directed by your healthcare provider    Trouble breathing    Fainting    Rapid heart rate    Vomiting blood    Large amounts of blood in stool  Date Last Reviewed: 6/9/2015 2000-2017 [employer]. [address] [place] [id number]. All rights reserved. This information is not intended as a substitute for professional medical care. Always follow your healthcare professional's instructions.                Follow-ups after your visit        Future tests that were ordered for you today     Open Future Orders        Priority Expected Expires Ordered    US Pelvic Limited Routine  9/12/2019 9/12/2018            Who to contact     If you have questions or need follow up information about today's clinic visit or your schedule please contact [facility] directly at [phone].  Normal or non-critical lab and imaging results will be communicated to you by MyChart, letter or phone within 4 business days after the clinic has received the results. If you do not hear from us within 7 days, please contact the clinic through MyChart or phone. If you have a critical or abnormal lab result, we will notify you by phone as soon as possible.  Submit refill requests through igobubble or call your pharmacy and they will forward the refill request to us. Please allow 3 business days for your refill to be completed.          Additional Information About Your Visit        Care EveryWhere ID     This is your Care EveryWhere ID. This could be used by other organizations to access your [place] medical records  [id number]         Your Vitals Were     Pulse Temperature Pulse Oximetry BMI (Body Mass Index)          76 98.7  F (37.1  C) (Tympanic) 95% 27.74 kg/m2         Blood Pressure from Last 3 Encounters:   09/12/18 146/82   07/27/18 132/80   06/30/18 136/81    Weight from Last 3 Encounters:   09/12/18 183 lb 12.8 oz (83.4 kg)   07/27/18 180 lb (81.6 kg)   05/30/18 185 lb 9.6 oz (84.2 kg)               Primary Care Provider Office Phone # Fax #    Cecilio Loco Diaz [phone] [address] [facility] [address]        Equal Access to Services     STEPHON HAY : Ayan medinao Solilibeth, waaxda luqadaha, qaybta kaalmada adeegyada, flavio hicks. So [facility] [phone].    ATENCIÓN: Si habla español, tiene a bradley disposición servicios gratuitos de asistencia lingüística. Llame al [phone].    We comply with applicable federal civil rights laws and [place] laws. We do not discriminate on the basis of race, color, national origin, age, disability, sex, sexual orientation, or gender identity.            Thank you!     Thank you for choosing [facility]  for your care. Our goal is always to provide you with excellent care. Hearing back from our patients is one way we can continue to improve our services. Please take a few minutes to complete the written survey that you may receive in the mail after your visit with us. Thank you!             Your Updated Medication List - Protect others around you: Learn how to safely use, store and throw away your medicines at www.disposemymeds.org.          This list is accurate as of 9/12/18 11:33 AM.  Always use your most recent med list.                   Brand Name Dispense Instructions for use Diagnosis    aspirin 81 MG tablet      1 TABLET DAILY    Essential hypertension, benign       atenolol 25 MG tablet    TENORMIN    90 tablet    Take 1 tablet (25 mg) by mouth daily    Essential hypertension, benign       erythromycin  ophthalmic ointment    ROMYCIN    1 g    Place 0.25 inches into the right eye 2 times daily    Hordeolum externum of right lower eyelid, Swelling of right eyelid       fluticasone 50 MCG/ACT spray    FLONASE    32 Bottle    Spray 1-2 sprays into both nostrils daily as needed for rhinitis    Chronic vasomotor rhinitis       lovastatin 40 MG tablet    MEVACOR    90 tablet    Take 1 tablet (40 mg) by mouth At Bedtime    Hyperlipidemia LDL goal <130       triamcinolone 0.5 % cream    KENALOG    30 g    Apply topically twice a day as needed to affected area. Call to refill.    Rash and other nonspecific skin eruption

## 2018-09-12 NOTE — PATIENT INSTRUCTIONS
Ultrasound was ordered.  Please call [phone] to schedule.   Hernia (Adult)    A hernia can happen when there is a weakness or defect in the wall of the abdomen or groin. Intestines or nearby tissues may move from their usual location and push through the weakness in the wall. This can cause a hernia (bulge) you may see or feel.  Causes and risk factors   A hernia may be present at birth. Or it may be caused by the wear and tear of daily living. Certain factors can make a hernia more likely. These can include:    Heavy lifting    Straining, whether from lifting, movement, or constipation    Chronic cough    Injury to the abdominal wall    Excess weight    Pregnancy    Prior surgery    Older age    Family history of hernia  Symptoms  Symptoms of a hernia may come on suddenly. Or they may appear slowly over time. Some common symptoms include:    Bulge in the groin area, around the navel, or in the scrotum (the bulge may get bigger when you stand and go away when you lie down)    Pain or pressure around the bulge    Pain during activities such as lifting, coughing, or sneezing    A feeling of weakness or pressure in the groin    Pain or swelling in the scrotum  Types of hernias  There are different types of hernia. The type you have depends on its location:    Inguinal. This type is in the groin or scrotum. It is more common in men.    Femoral. This type is in the groin, upper thigh (where the leg bends), or labia. It is more common in women.    Ventral. This type is in the abdominal wall.    Umbilical. This type occurs around the navel (belly button).    Incisional. This type occurs at the site of a previous surgery.  The condition of the hernia can help determine how urgently it needs to be treated.    Reducible. It goes back in by itself, or it can be pushed back in.    Irreducible. It can t be pushed back in.    Incarcerated/strangulated. The intestine is trapped (incarcerated). If this happens, you won t be  able to push the bulge back in. If the incarcerated hernia isn t treated, it may become strangulated. This means the area loses blood supply and the tissue may die. This requires emergency surgery. You need treatment right away.  In most cases, a hernia will not heal on its own. Surgery is usually needed to repair the defect in the abdominal wall or groin. You ll be told more about surgery, if needed.  If your symptoms are not severe, treatment may sometimes be delayed. In such cases, regular follow-up visits with the provider will be needed. You ll be asked to keep track of your symptoms and to watch for signs of more serious problems. You may also be given guidelines similar to the home care instructions below.  Home care  To help keep a hernia from getting worse, you may be advised to:    Avoid heavy lifting and straining as directed.    Take steps to prevent constipation, such as eating more fiber and drinking more water. This may help reduce straining that can occur when having a bowel movement. Reducing straining may help keep your symptoms from getting worse.    Maintain a healthy weight or lose excess weight. This can help reduce strain on abdominal muscles and tissues.    Stop smoking. This can help prevent coughing that may also strain abdominal muscles and tissues.  Follow-up care  Follow up with your healthcare provider, or as directed. If imaging tests were done, they will be reviewed a doctor. You will be told the results and any new findings that may affect your care.  When to seek medical advice  Call your healthcare provider right away if any of these occur:    Hernia hardens, swells, or grows larger    Hernia can no longer be pushed back in    Pain moves to the lower right abdomen (just below the waistline), or spreads to the back  Call 911  Call 911 if any of these occur:    Nausea and vomiting    Severe pain, redness, or tenderness in the area near the hernia    Pain worsens quickly and doesn t  get better    Inability to have a bowel movement or pass gas    Fever of 100.4 F (38 C) or higher, or as directed by your healthcare provider    Trouble breathing    Fainting    Rapid heart rate    Vomiting blood    Large amounts of blood in stool  Date Last Reviewed: 6/9/2015 2000-2017 [employer]. [address] [place] [address], [place] [address]. All rights reserved. This information is not intended as a substitute for professional medical care. Always follow your healthcare professional's instructions.

## 2018-09-12 NOTE — PROGRESS NOTES
SUBJECTIVE:   Velasquez Del Rio is a 73 year old male who presents to clinic today for the following health issues:  Chief Complaint   Patient presents with     Groin Pain     noticed last night, swelling, tenderness on right side of groin - near right testicle.  No known injury        Groin pain      Duration: noticed last night    Description (location/character/radiation): PATIENT reports having some lower right quadrant pain, swelling, tenderness near right testicles.  No pain w/ moving or walking but tender to the touch.     Intensity:  mild    Accompanying signs and symptoms: Has some straining to get a urinary stream going after he's been sitting for awhile but normal stream if he's been active.  No dysuria, hematuria, discharge, testicular swelling or pain, constipation, diarrhea, blood in the stool, fevers, chills.     History (similar episodes/previous evaluation): None    Precipitating or alleviating factors: Patient reports helping son-in-law lift a headboard into the bed of a truck about 3 weeks ago and felt a little pulling across abdomen at that time and for a couple of days following but then it seemed to resolve.  Was digging potatoes last night and pulling on some roots    Therapies tried and outcome: None       Problem list and histories reviewed & adjusted, as indicated.  Additional history: as documented    Patient Active Problem List   Diagnosis     Benign neoplasm of colon     Hyperlipidemia LDL goal <130     Advanced directives, counseling/discussion     Health Care Home     Herpes zoster     Diverticulosis of large intestine     Family history of colon cancer     Family history of malignant neoplasm of breast     Essential hypertension with goal blood pressure less than 140/90     Dermatitis     Chronic vasomotor rhinitis     Past Surgical History:   Procedure Laterality Date     COLONOSCOPY  2001    negative      COLONOSCOPY  12/13/2011    Procedure:COLONOSCOPY; Colonoscopy; Surgeon:PHILLIP  DARCIE NEVES; Location:[facility]     COLONOSCOPY N/A 8/10/2016    Procedure: COLONOSCOPY;  Surgeon: Morales Renteria MD;  Location: [facility]       Social History   Substance Use Topics     Smoking status: Former Smoker     Packs/day: 1.00     Years: 7.00     Types: Cigarettes     Quit date: 1968     Smokeless tobacco: Never Used     Alcohol use Yes      Comment: minimal     Family History   Problem Relation Age of Onset     Hypertension Mother      Cerebrovascular Disease Mother       of a stroke     Cancer - colorectal Mother      age 70s     Prostate Cancer Father      had prostate removed     Diabetes Maternal Grandmother      Cancer Brother      bladder cancer     Colon Cancer Daughter 53     colon cancer surger     Breast Cancer Daughter      Asthma No family hx of      C.A.D. No family hx of          Current Outpatient Prescriptions   Medication Sig Dispense Refill     ASPIRIN 81 MG OR TABS 1 TABLET DAILY       atenolol (TENORMIN) 25 MG tablet Take 1 tablet (25 mg) by mouth daily 90 tablet 3     lovastatin (MEVACOR) 40 MG tablet Take 1 tablet (40 mg) by mouth At Bedtime 90 tablet 3     erythromycin (ROMYCIN) ophthalmic ointment Place 0.25 inches into the right eye 2 times daily (Patient not taking: Reported on 2018) 1 g 0     fluticasone (FLONASE) 50 MCG/ACT spray Spray 1-2 sprays into both nostrils daily as needed for rhinitis 32 Bottle 11     triamcinolone (KENALOG) 0.5 % cream Apply topically twice a day as needed to affected area. Call to refill. 30 g 3     Allergies   Allergen Reactions     Nkda [No Known Drug Allergies]        Reviewed and updated as needed this visit by clinical staff  Tobacco  Allergies  Meds  Med Hx  Surg Hx  Fam Hx  Soc Hx      Reviewed and updated as needed this visit by Provider         ROS:  Constitutional, gi and gu systems are negative, except as otherwise noted.    OBJECTIVE:     /82 (BP Location: Right arm, Patient Position: Chair, Cuff Size: Adult  Regular)  Pulse 76  Temp 98.7  F (37.1  C) (Tympanic)  Wt 183 lb 12.8 oz (83.4 kg)  SpO2 95%  BMI 27.74 kg/m2  Body mass index is 27.74 kg/(m^2).    GENERAL: healthy, alert and no distress  RESP: lungs clear to auscultation - no rales, rhonchi or wheezes  CV: regular rate and rhythm, normal S1 S2, no S3 or S4, no murmur, click or rub  ABDOMEN: soft, no tenderness, no rebound or guarding, no hepatosplenomegaly, no masses and bowel sounds normal  GROIN: some mild tenderness and fullness in the right inguinal ligament        ASSESSMENT/PLAN:       1. Right groin pain    Tenderness and fullness noted around the right inguinal ligament seems most likely to be a hernia, especially since symptoms started after some heavier lifting and straining.  He does not have any severe pain or GI symptoms to suggest incarcerated hernia, so no need for emergent evaluation.  Other etiology could be enlarged node, but no clear provoking factors for this.  No  symptoms to suggest infection.  Will get ultrasound for further evaluation.  Discussed home care of hernia and symptome of incarceration to watch for.     - US Pelvic Limited; Future    Merritt Hopkins MD  [facility]

## 2018-09-28 ENCOUNTER — OFFICE VISIT (OUTPATIENT)
Dept: FAMILY MEDICINE | Facility: CLINIC | Age: 73
End: 2018-09-28
Payer: COMMERCIAL

## 2018-09-28 ENCOUNTER — HOSPITAL ENCOUNTER (OUTPATIENT)
Dept: CT IMAGING | Facility: CLINIC | Age: 73
Discharge: HOME | End: 2018-09-28
Attending: INTERNAL MEDICINE | Admitting: INTERNAL MEDICINE
Payer: MEDICARE

## 2018-09-28 VITALS
TEMPERATURE: 97.6 F | OXYGEN SATURATION: 98 % | SYSTOLIC BLOOD PRESSURE: 148 MMHG | HEART RATE: 86 BPM | DIASTOLIC BLOOD PRESSURE: 86 MMHG | WEIGHT: 184.2 LBS | BODY MASS INDEX: 27.8 KG/M2

## 2018-09-28 DIAGNOSIS — R19.09 RIGHT GROIN MASS: Primary | ICD-10-CM

## 2018-09-28 DIAGNOSIS — K40.20 NON-RECURRENT BILATERAL INGUINAL HERNIA WITHOUT OBSTRUCTION OR GANGRENE: Primary | ICD-10-CM

## 2018-09-28 DIAGNOSIS — R19.09 RIGHT GROIN MASS: ICD-10-CM

## 2018-09-28 PROCEDURE — 25000125 ZZHC RX 250: Performed by: RADIOLOGY

## 2018-09-28 PROCEDURE — 74177 CT ABD & PELVIS W/CONTRAST: CPT

## 2018-09-28 PROCEDURE — 25000128 H RX IP 250 OP 636: Performed by: RADIOLOGY

## 2018-09-28 PROCEDURE — 99213 OFFICE O/P EST LOW 20 MIN: CPT | Performed by: INTERNAL MEDICINE

## 2018-09-28 RX ORDER — IOPAMIDOL 755 MG/ML
90 INJECTION, SOLUTION INTRAVASCULAR ONCE
Status: COMPLETED | OUTPATIENT
Start: 2018-09-28 | End: 2018-09-28

## 2018-09-28 RX ADMIN — SODIUM CHLORIDE 62 ML: 9 INJECTION, SOLUTION INTRAVENOUS at 16:19

## 2018-09-28 RX ADMIN — IOPAMIDOL 90 ML: 755 INJECTION, SOLUTION INTRAVENOUS at 16:19

## 2018-09-28 NOTE — PROGRESS NOTES
"  SUBJECTIVE:   Velasquez Del Rio is a 73 year old male who presents to clinic today for the following health issues:    Chief Complaint   Patient presents with     RECHECK     follow up from 9/12/18, groin lump and some discomfort lower right quadrant     Imm/Inj     deferred at this time     Follow up groin lump       Duration: on-going for 2.5 weeks     Description (location/character/radiation): PATIENT reports having a lump, almost egg size,  in lower right quadrant w/ some persistent achy discomfort.  Patient reports there has not been much change.    Patient reports it seems to \"disappear\" when he lays down, but it seems to be more prominent w/ standing- after he gets up to go to the bathroom he will notice it has returned.      Intensity:  mild, moderate    Accompanying signs and symptoms: A bit more \"gurgling\" from that area for a few days, no redness at site.  No fevers, chills, night sweats, dysuria.  Weight is stable from last visit    History (similar episodes/previous evaluation): None    Precipitating or alleviating factors:     patient reports he does stationary biking and 50 sit-ups and does not seem to be irritated w/ activity.  Wondering if he should have some restrictions.    Therapies tried and outcome: None       I saw Jung for this problem in 9/12 and thought it was most likely a hernia.  He had an ultrasound that did not see any hernia.  He continues to worry about the area due to an extensive family history of cancer.     Problem list and histories reviewed & adjusted, as indicated.  Additional history: as documented    Patient Active Problem List   Diagnosis     Benign neoplasm of colon     Hyperlipidemia LDL goal <130     Advanced directives, counseling/discussion     Health Care Home     Herpes zoster     Diverticulosis of large intestine     Family history of colon cancer     Family history of malignant neoplasm of breast     Essential hypertension with goal blood pressure less than 140/90 "     Dermatitis     Chronic vasomotor rhinitis     Past Surgical History:   Procedure Laterality Date     COLONOSCOPY  2001    negative      COLONOSCOPY  2011    Procedure:COLONOSCOPY; Colonoscopy; Surgeon:DARCIE FISHRE; Location:[facility]     COLONOSCOPY N/A 8/10/2016    Procedure: COLONOSCOPY;  Surgeon: Morales Renteria MD;  Location: [facility]       Social History   Substance Use Topics     Smoking status: Former Smoker     Packs/day: 1.00     Years: 7.00     Types: Cigarettes     Quit date: 1968     Smokeless tobacco: Never Used     Alcohol use Yes      Comment: minimal     Family History   Problem Relation Age of Onset     Hypertension Mother      Cerebrovascular Disease Mother       of a stroke     Cancer - colorectal Mother      age 70s     Prostate Cancer Father      had prostate removed     Diabetes Maternal Grandmother      Cancer Brother      bladder cancer     Colon Cancer Daughter 53     colon cancer surger     Breast Cancer Daughter      Asthma No family hx of      C.A.D. No family hx of          Current Outpatient Prescriptions   Medication Sig Dispense Refill     ASPIRIN 81 MG OR TABS 1 TABLET DAILY       atenolol (TENORMIN) 25 MG tablet Take 1 tablet (25 mg) by mouth daily 90 tablet 3     lovastatin (MEVACOR) 40 MG tablet Take 1 tablet (40 mg) by mouth At Bedtime 90 tablet 3     erythromycin (ROMYCIN) ophthalmic ointment Place 0.25 inches into the right eye 2 times daily (Patient not taking: Reported on 2018) 1 g 0     fluticasone (FLONASE) 50 MCG/ACT spray Spray 1-2 sprays into both nostrils daily as needed for rhinitis 32 Bottle 11     triamcinolone (KENALOG) 0.5 % cream Apply topically twice a day as needed to affected area. Call to refill. 30 g 3     Allergies   Allergen Reactions     Nkda [No Known Drug Allergies]        Reviewed and updated as needed this visit by clinical staff  Tobacco  Allergies  Meds  Med Hx  Surg Hx  Fam Hx  Soc Hx      Reviewed and updated as  needed this visit by Provider         ROS:  Constitutional, HEENT, cardiovascular, pulmonary, gi and gu systems are negative, except as otherwise noted.    OBJECTIVE:     /86  Pulse 86  Temp 97.6  F (36.4  C) (Tympanic)  Wt 184 lb 3.2 oz (83.6 kg)  SpO2 98%  BMI 27.8 kg/m2  Body mass index is 27.8 kg/(m^2).  GENERAL: healthy, alert and no distress  GROIN: bulging fullness in the right groin about 7cm in diameter with standing, not noticeable while laying down      ASSESSMENT/PLAN:       1. Right groin mass    I still feel this is likely a hernia despite the negative U/S however given his family history of cancer and his anxiety regarding this ongoing mass, we will proceed with CT for further evaluation.  Hernia handout was given at last visit with precautions- reiterated to avoid heavy lifting.     - CT Abdomen Pelvis w Contrast; Future    Merritt Hopkins MD  [facility]

## 2018-09-28 NOTE — MR AVS SNAPSHOT
After Visit Summary   9/28/2018    Velasquez Del Rio    MRN: [id number]           Patient Information     Date Of Birth          1945        Visit Information        Provider Department      9/28/2018 8:40 AM Merritt Hopkins MD [facility]        Today's Diagnoses     Right groin mass    -  1      Care Instructions    CT scan was ordered.  Please call [phone] to schedule.           Follow-ups after your visit        Your next 10 appointments already scheduled     Oct 15, 2018 11:20 AM CDT   Nurse Only with [facility]   [facility]    [address] [id number] [phone]              Future tests that were ordered for you today     Open Future Orders        Priority Expected Expires Ordered    CT Abdomen Pelvis w Contrast Routine  9/28/2019 9/28/2018            Who to contact     If you have questions or need follow up information about today's clinic visit or your schedule please contact [facility] directly at [phone].  Normal or non-critical lab and imaging results will be communicated to you by MyChart, letter or phone within 4 business days after the clinic has received the results. If you do not hear from us within 7 days, please contact the clinic through MyChart or phone. If you have a critical or abnormal lab result, we will notify you by phone as soon as possible.  Submit refill requests through [employer]* or call your pharmacy and they will forward the refill request to us. Please allow 3 business days for your refill to be completed.          Additional Information About Your Visit        Care EveryWhere ID     This is your Care EveryWhere ID. This could be used by other organizations to access your [facility] medical records  [id number]        Your Vitals Were     Pulse Temperature Pulse Oximetry BMI (Body Mass Index)          86 97.6  F (36.4  C) (Tympanic) 98% 27.8 kg/m2          Blood Pressure from Last 3 Encounters:   09/28/18 150/88   09/12/18 146/82   07/27/18 132/80    Weight from Last 3 Encounters:   09/28/18 184 lb 3.2 oz (83.6 kg)   09/12/18 183 lb 12.8 oz (83.4 kg)   07/27/18 180 lb (81.6 kg)               Primary Care Provider Office Phone # Fax #    Cecilio Diaz [phone] [address] [address]        Equal Access to Services     HODAN HAY : Hadii july ku hadasho Soomaali, waaxda luqadaha, qaybta kaalmada adeegyada, flavio maravilla . So [facility] [phone].    ATENCIÓN: Si habla español, tiene a bradley disposición servicios gratuitos de asistencia lingüística. [facility] [phone].    We comply with applicable federal civil rights laws and [place] laws. We do not discriminate on the basis of race, color, national origin, age, disability, sex, sexual orientation, or gender identity.            Thank you!     Thank you for choosing [facility]  for your care. Our goal is always to provide you with excellent care. Hearing back from our patients is one way we can continue to improve our services. Please take a few minutes to complete the written survey that you may receive in the mail after your visit with us. Thank you!             Your Updated Medication List - Protect others around you: Learn how to safely use, store and throw away your medicines at www.disposemymeds.org.          This list is accurate as of 9/28/18  9:09 AM.  Always use your most recent med list.                   Brand Name Dispense Instructions for use Diagnosis    aspirin 81 MG tablet      1 TABLET DAILY    Essential hypertension, benign       atenolol 25 MG tablet    TENORMIN    90 tablet    Take 1 tablet (25 mg) by mouth daily    Essential hypertension, benign       erythromycin ophthalmic ointment    ROMYCIN    1 g    Place 0.25 inches into the right eye 2 times daily    Hordeolum externum of right lower eyelid, Swelling of  right eyelid       fluticasone 50 MCG/ACT spray    FLONASE    32 Bottle    Spray 1-2 sprays into both nostrils daily as needed for rhinitis    Chronic vasomotor rhinitis       lovastatin 40 MG tablet    MEVACOR    90 tablet    Take 1 tablet (40 mg) by mouth At Bedtime    Hyperlipidemia LDL goal <130       triamcinolone 0.5 % cream    KENALOG    30 g    Apply topically twice a day as needed to affected area. Call to refill.    Rash and other nonspecific skin eruption

## 2018-10-01 NOTE — ADDENDUM NOTE
Encounter addended by: Merritt Hopkins MD on: 10/1/2018  9:04 AM<BR>     Actions taken: Visit diagnoses modified,  activity accessed, Diagnosis association updated

## 2018-10-05 ENCOUNTER — OFFICE VISIT (OUTPATIENT)
Dept: SURGERY | Facility: CLINIC | Age: 73
End: 2018-10-05
Payer: COMMERCIAL

## 2018-10-05 VITALS
DIASTOLIC BLOOD PRESSURE: 81 MMHG | HEART RATE: 79 BPM | WEIGHT: 185 LBS | TEMPERATURE: 97.1 F | BODY MASS INDEX: 28.04 KG/M2 | SYSTOLIC BLOOD PRESSURE: 141 MMHG | HEIGHT: 68 IN

## 2018-10-05 DIAGNOSIS — K40.20 NON-RECURRENT BILATERAL INGUINAL HERNIA WITHOUT OBSTRUCTION OR GANGRENE: Primary | ICD-10-CM

## 2018-10-05 DIAGNOSIS — K42.9 UMBILICAL HERNIA WITHOUT OBSTRUCTION AND WITHOUT GANGRENE: ICD-10-CM

## 2018-10-05 PROCEDURE — 99214 OFFICE O/P EST MOD 30 MIN: CPT | Performed by: SURGERY

## 2018-10-05 NOTE — PROGRESS NOTES
73-year-old male complaint of 2-month history of right groin mass and localized discomfort.  Patient reports the pain is more of a achy uncomfortableness.  1 out of 10 with radiation to the right testicle.  Aggravated by straining and alleviated by rest.  Patient denies fevers chills nausea and vomiting.  Patient has no other associated symptoms.  Patient travels to [place] for the winter and will be leaving in approximately 1 month's time.  He is interested in his options.    Patient Active Problem List   Diagnosis     Benign neoplasm of colon     Hyperlipidemia LDL goal <130     Advanced directives, counseling/discussion     Health Care Home     Herpes zoster     Diverticulosis of large intestine     Family history of colon cancer     Family history of malignant neoplasm of breast     Essential hypertension with goal blood pressure less than 140/90     Dermatitis     Chronic vasomotor rhinitis       History reviewed. No pertinent past medical history.    Past Surgical History:   Procedure Laterality Date     COLONOSCOPY      negative      COLONOSCOPY  2011    Procedure:COLONOSCOPY; Colonoscopy; Surgeon:DARCIE FISHER; Location:[facility]     COLONOSCOPY N/A 8/10/2016    Procedure: COLONOSCOPY;  Surgeon: Morales Renteria MD;  Location: [facility]       Family History   Problem Relation Age of Onset     Hypertension Mother      Cerebrovascular Disease Mother       of a stroke     Cancer - colorectal Mother      age 70s     Prostate Cancer Father      had prostate removed     Diabetes Maternal Grandmother      Cancer Brother      bladder cancer     Colon Cancer Daughter 53     colon cancer surger     Breast Cancer Daughter      Asthma No family hx of      C.A.D. No family hx of        Social History   Substance Use Topics     Smoking status: Former Smoker     Packs/day: 1.00     Years: 7.00     Types: Cigarettes     Quit date: 1968     Smokeless tobacco: Never Used     Alcohol use Yes      Comment:  "minimal        History   Drug Use No       Current Outpatient Prescriptions   Medication Sig Dispense Refill     ASPIRIN 81 MG OR TABS 1 TABLET DAILY       atenolol (TENORMIN) 25 MG tablet Take 1 tablet (25 mg) by mouth daily 90 tablet 3     erythromycin (ROMYCIN) ophthalmic ointment Place 0.25 inches into the right eye 2 times daily (Patient not taking: Reported on 7/27/2018) 1 g 0     fluticasone (FLONASE) 50 MCG/ACT spray Spray 1-2 sprays into both nostrils daily as needed for rhinitis 32 Bottle 11     lovastatin (MEVACOR) 40 MG tablet Take 1 tablet (40 mg) by mouth At Bedtime 90 tablet 3     triamcinolone (KENALOG) 0.5 % cream Apply topically twice a day as needed to affected area. Call to refill. 30 g 3       Allergies   Allergen Reactions     Nkda [No Known Drug Allergies]       ROS  Constitutional - Denies fevers, weight loss, malaise, lethargy  Neuro - Denies tremors or seizures  Pulmon - Denies SOB, dyspnea, hemoptysis, chronic cough or use of an inhaler  CV - Denies CP, SOB, lower extremity edema, difficulty w/ stairs, has never used NTG  GI - Denies hematemesis, BRBPR, melena, chronic diarrhea or epigastric pain   - Denies hematuria, difficulty voiding, h/o STDs  Hematology - Denies blood clotting disorders, chronic anemias  Dermatology - No melanomas or skin cancers  Rheumatology - No h/o RA  Pysch - Denies depression, bipolar d/o or schizophrenia    Exam:/81 (BP Location: Right arm, Patient Position: Sitting, Cuff Size: Adult Regular)  Pulse 79  Temp 97.1  F (36.2  C) (Oral)  Ht 1.734 m (5' 8.25\")  Wt 83.9 kg (185 lb)  BMI 27.92 kg/m2    General - Alert and Oriented X4, NAD, well nourished  HEENT - Normocephalic, atraumatic, PERRLA, Nose midline, Throat without lesions  Neck - supple, no LAD  Lungs - Clear to auscultation bilaterally with good inspiratory effort, no tactile fremitus  CV - Heart RRR, no lift's, thrills, murmurs, rubs, or gallops. Carotid, radial, and femoral pulses 2+ " bilaterally  Abdomen - Soft, non-tender, +BS, no hepatosplenomegaly, small palpable reducible mass at umbilicus, fascial defect approximately 1 cm  Groins - 2+ pulses bilaterally and no LAD, large palpable reducible mass in right groin, palpable smaller mass in left groin  Neuro - Full ROM, Strength 5/5 and major muscle groups, sensation intact  Extremities - No cyanosis, clubbing or edema    Assessment and plan: 73-year-old male with bilateral inguinal hernias and an umbilical hernia.  Patient is a good candidate for laparoscopic repair.  Risks benefits alternatives and complications were discussed with the patient including the possibility of infection bleeding or hernia recurrence.  Patient understood and would like to proceed.  Advised patient that this is not an emergent procedure and this could wait until after he returns from [place].  Patient will discuss this with his wife and call back when he is ready to schedule surgery.  He will be needing a preoperative exam from his primary care provider including an EKG.    Mckinley Colindres MD

## 2018-10-05 NOTE — MR AVS SNAPSHOT
"              After Visit Summary   10/5/2018    Velasquez Del Rio    MRN: [id number]           Patient Information     Date Of Birth          1945        Visit Information        Provider Department      10/5/2018 9:30 AM Mckinley Colindres MD [facility]        Today's Diagnoses     Non-recurrent bilateral inguinal hernia without obstruction or gangrene    -  1    Umbilical hernia without obstruction and without gangrene          Care Instructions    Per Dr. Colindres's instructions          Follow-ups after your visit        Your next 10 appointments already scheduled     Oct 15, 2018 11:20 AM CDT   [facility]   [facility]    [address] [address] [phone]              Who to contact     If you have questions or need follow up information about today's clinic visit or your schedule please contact [facility] directly at [phone].  Normal or non-critical lab and imaging results will be communicated to you by MyChart, letter or phone within 4 business days after the clinic has received the results. If you do not hear from us within 7 days, please contact the clinic through MyChart or phone. If you have a critical or abnormal lab result, we will notify you by phone as soon as possible.  Submit refill requests through [email or web address]) or call your pharmacy and they will forward the refill request to us. Please allow 3 business days for your refill to be completed.          Additional Information About Your Visit        Care EveryWhere ID     This is your Care EveryWhere ID. This could be used by other organizations to access your [facility] medical records  [id number]        Your Vitals Were     Pulse Temperature Height BMI (Body Mass Index)          79 97.1  F (36.2  C) (Oral) 1.734 m (5' 8.25\") 27.92 kg/m2         Blood Pressure from Last 3 Encounters:   10/05/18 141/81   09/28/18 148/86   09/12/18 146/82 "    Weight from Last 3 Encounters:   10/05/18 83.9 kg (185 lb)   09/28/18 83.6 kg (184 lb 3.2 oz)   09/12/18 83.4 kg (183 lb 12.8 oz)              We Performed the Following     Surgical Case Request General Surgery: LAPAROSCOPIC HERNIORRHAPHY INGUINAL BILATERAL        Primary Care Provider Office Phone # Fax #    Cecilio Diaz [phone] [address] [address]        Equal Access to Services     STEPHON HAY : Hadii aad ku hadasho Soomaali, waaxda luqadaha, qaybta kaalmada adeegyada, waxay idiin hayaan adegeorge maravilla . So [facility] [phone].    ATENCIÓN: Si habla español, tiene a bradley disposición servicios gratuitos de asistencia lingüística. Llame al [phone].    We comply with applicable federal civil rights laws and [place] laws. We do not discriminate on the basis of race, color, national origin, age, disability, sex, sexual orientation, or gender identity.            Thank you!     Thank you for choosing [facility]  for your care. Our goal is always to provide you with excellent care. Hearing back from our patients is one way we can continue to improve our services. Please take a few minutes to complete the written survey that you may receive in the mail after your visit with us. Thank you!             Your Updated Medication List - Protect others around you: Learn how to safely use, store and throw away your medicines at www.disposemymeds.org.          This list is accurate as of 10/5/18 10:53 AM.  Always use your most recent med list.                   Brand Name Dispense Instructions for use Diagnosis    aspirin 81 MG tablet      1 TABLET DAILY    Essential hypertension, benign       atenolol 25 MG tablet    TENORMIN    90 tablet    Take 1 tablet (25 mg) by mouth daily    Essential hypertension, benign       erythromycin ophthalmic ointment    ROMYCIN    1 g    Place 0.25 inches into the right eye 2 times daily    Hordeolum externum of  right lower eyelid, Swelling of right eyelid       fluticasone 50 MCG/ACT spray    FLONASE    32 Bottle    Spray 1-2 sprays into both nostrils daily as needed for rhinitis    Chronic vasomotor rhinitis       lovastatin 40 MG tablet    MEVACOR    90 tablet    Take 1 tablet (40 mg) by mouth At Bedtime    Hyperlipidemia LDL goal <130       triamcinolone 0.5 % cream    KENALOG    30 g    Apply topically twice a day as needed to affected area. Call to refill.    Rash and other nonspecific skin eruption

## 2018-10-05 NOTE — NURSING NOTE
"Initial /81 (BP Location: Right arm, Patient Position: Sitting, Cuff Size: Adult Regular)  Pulse 79  Temp 97.1  F (36.2  C) (Oral)  Ht 1.734 m (5' 8.25\")  Wt 83.9 kg (185 lb)  BMI 27.92 kg/m2 Estimated body mass index is 27.92 kg/(m^2) as calculated from the following:    Height as of this encounter: 1.734 m (5' 8.25\").    Weight as of this encounter: 83.9 kg (185 lb). .    Estephanie Johnson MA    "

## 2018-10-05 NOTE — LETTER
10/5/2018         RE: Velasquez Del Rio  [address]  [place] [facility] [address]        Dear Colleague,    Thank you for referring your patient, Velasquez Del Rio, to [facility]. Please see a copy of my visit note below.    73-year-old male complaint of 2-month history of right groin mass and localized discomfort.  Patient reports the pain is more of a achy uncomfortableness.  1 out of 10 with radiation to the right testicle.  Aggravated by straining and alleviated by rest.  Patient denies fevers chills nausea and vomiting.  Patient has no other associated symptoms.  Patient travels to [place] for the winter and will be leaving in approximately 1 month's time.  He is interested in his options.    Patient Active Problem List   Diagnosis     Benign neoplasm of colon     Hyperlipidemia LDL goal <130     Advanced directives, counseling/discussion     Health Care Home     Herpes zoster     Diverticulosis of large intestine     Family history of colon cancer     Family history of malignant neoplasm of breast     Essential hypertension with goal blood pressure less than 140/90     Dermatitis     Chronic vasomotor rhinitis       History reviewed. No pertinent past medical history.    Past Surgical History:   Procedure Laterality Date     COLONOSCOPY      negative      COLONOSCOPY  2011    Procedure:COLONOSCOPY; Colonoscopy; Surgeon:DARCIE FISHER; Location:[facility]     COLONOSCOPY N/A 8/10/2016    Procedure: COLONOSCOPY;  Surgeon: Morales Renteria MD;  Location: [facility]       Family History   Problem Relation Age of Onset     Hypertension Mother      Cerebrovascular Disease Mother       of a stroke     Cancer - colorectal Mother      age 70s     Prostate Cancer Father      had prostate removed     Diabetes Maternal Grandmother      Cancer Brother      bladder cancer     Colon Cancer Daughter 53     colon cancer surger     Breast Cancer Daughter      Asthma No family hx of      C.A.D. No  "family hx of        Social History   Substance Use Topics     Smoking status: Former Smoker     Packs/day: 1.00     Years: 7.00     Types: Cigarettes     Quit date: 11/13/1968     Smokeless tobacco: Never Used     Alcohol use Yes      Comment: minimal        History   Drug Use No       Current Outpatient Prescriptions   Medication Sig Dispense Refill     ASPIRIN 81 MG OR TABS 1 TABLET DAILY       atenolol (TENORMIN) 25 MG tablet Take 1 tablet (25 mg) by mouth daily 90 tablet 3     erythromycin (ROMYCIN) ophthalmic ointment Place 0.25 inches into the right eye 2 times daily (Patient not taking: Reported on 7/27/2018) 1 g 0     fluticasone (FLONASE) 50 MCG/ACT spray Spray 1-2 sprays into both nostrils daily as needed for rhinitis 32 Bottle 11     lovastatin (MEVACOR) 40 MG tablet Take 1 tablet (40 mg) by mouth At Bedtime 90 tablet 3     triamcinolone (KENALOG) 0.5 % cream Apply topically twice a day as needed to affected area. Call to refill. 30 g 3       Allergies   Allergen Reactions     Nkda [No Known Drug Allergies]       ROS  Constitutional - Denies fevers, weight loss, malaise, lethargy  Neuro - Denies tremors or seizures  Pulmon - Denies SOB, dyspnea, hemoptysis, chronic cough or use of an inhaler  CV - Denies CP, SOB, lower extremity edema, difficulty w/ stairs, has never used NTG  GI - Denies hematemesis, BRBPR, melena, chronic diarrhea or epigastric pain   - Denies hematuria, difficulty voiding, h/o STDs  Hematology - Denies blood clotting disorders, chronic anemias  Dermatology - No melanomas or skin cancers  Rheumatology - No h/o RA  Pysch - Denies depression, bipolar d/o or schizophrenia    Exam:/81 (BP Location: Right arm, Patient Position: Sitting, Cuff Size: Adult Regular)  Pulse 79  Temp 97.1  F (36.2  C) (Oral)  Ht 1.734 m (5' 8.25\")  Wt 83.9 kg (185 lb)  BMI 27.92 kg/m2    General - Alert and Oriented X4, NAD, well nourished  HEENT - Normocephalic, atraumatic, PERRLA, Nose midline, " Throat without lesions  Neck - supple, no LAD  Lungs - Clear to auscultation bilaterally with good inspiratory effort, no tactile fremitus  CV - Heart RRR, no lift's, thrills, murmurs, rubs, or gallops. Carotid, radial, and femoral pulses 2+ bilaterally  Abdomen - Soft, non-tender, +BS, no hepatosplenomegaly, small palpable reducible mass at umbilicus, fascial defect approximately 1 cm  Groins - 2+ pulses bilaterally and no LAD, large palpable reducible mass in right groin, palpable smaller mass in left groin  Neuro - Full ROM, Strength 5/5 and major muscle groups, sensation intact  Extremities - No cyanosis, clubbing or edema    Assessment and plan: 73-year-old male with bilateral inguinal hernias and an umbilical hernia.  Patient is a good candidate for laparoscopic repair.  Risks benefits alternatives and complications were discussed with the patient including the possibility of infection bleeding or hernia recurrence.  Patient understood and would like to proceed.  Advised patient that this is not an emergent procedure and this could wait until after he returns from [place].  Patient will discuss this with his wife and call back when he is ready to schedule surgery.  He will be needing a preoperative exam from his primary care provider including an EKG.    Mckinley Colindres MD     Again, thank you for allowing me to participate in the care of your patient.        Sincerely,        Mckinley Colindres MD

## 2018-10-15 ENCOUNTER — ALLIED HEALTH/NURSE VISIT (OUTPATIENT)
Dept: FAMILY MEDICINE | Facility: CLINIC | Age: 73
End: 2018-10-15
Payer: COMMERCIAL

## 2018-10-15 DIAGNOSIS — Z23 NEED FOR PROPHYLACTIC VACCINATION AND INOCULATION AGAINST INFLUENZA: Primary | ICD-10-CM

## 2018-10-15 PROCEDURE — 99207 ZZC NO CHARGE NURSE ONLY: CPT

## 2018-10-15 PROCEDURE — 90662 IIV NO PRSV INCREASED AG IM: CPT

## 2018-10-15 PROCEDURE — G0008 ADMIN INFLUENZA VIRUS VAC: HCPCS

## 2018-10-15 NOTE — MR AVS SNAPSHOT
After Visit Summary   10/15/2018    Velasquez Del Rio    MRN: [id number]           Patient Information     Date Of Birth          1945        Visit Information        Provider Department      10/15/2018 11:20 AM Critical access hospital [facility]        Today's Diagnoses     Need for prophylactic vaccination and inoculation against influenza    -  1       Follow-ups after your visit        Your next 10 appointments already scheduled     Oct 15, 2018 11:20 AM CDT   Nurse Only with Critical access hospital [facility]   [facility]    [address] [id number] [phone]              Who to contact     If you have questions or need follow up information about today's clinic visit or your schedule please contact [facility] directly at [phone].  Normal or non-critical lab and imaging results will be communicated to you by MyChart, letter or phone within 4 business days after the clinic has received the results. If you do not hear from us within 7 days, please contact the clinic through MyChart or phone. If you have a critical or abnormal lab result, we will notify you by phone as soon as possible.  Submit refill requests through [email or web address] or call your pharmacy and they will forward the refill request to us. Please allow 3 business days for your refill to be completed.          Additional Information About Your Visit        Care EveryWhere ID     This is your Care EveryWhere ID. This could be used by other organizations to access your [place] medical records  [id number]         Blood Pressure from Last 3 Encounters:   10/05/18 141/81   09/28/18 148/86   09/12/18 146/82    Weight from Last 3 Encounters:   10/05/18 185 lb (83.9 kg)   09/28/18 184 lb 3.2 oz (83.6 kg)   09/12/18 183 lb 12.8 oz (83.4 kg)              We Performed the Following     FLU VACCINE, INCREASED ANTIGEN, PRESV FREE, AGE 65+ [39376]     Vaccine  Administration, Initial [03657]        Primary Care Provider Office Phone # Fax #    Cecilio Diaz [phone] [address] [facility] [facility] [address]        Equal Access to Services     STEPHON HAY : Hadii aad ku hadsilviano Solilibeth, waaxda luqadaha, qaybta kaalmada adegeorgeyada, flavio ragsdale taiwo hicks. So [facility] [phone].    ATENCIÓN: Si habla español, tiene a bradley disposición servicios gratuitos de asistencia lingüística. Llame al [phone].    We comply with applicable federal civil rights laws and [place] laws. We do not discriminate on the basis of race, color, national origin, age, disability, sex, sexual orientation, or gender identity.            Thank you!     Thank you for choosing [facility]  for your care. Our goal is always to provide you with excellent care. Hearing back from our patients is one way we can continue to improve our services. Please take a few minutes to complete the written survey that you may receive in the mail after your visit with us. Thank you!             Your Updated Medication List - Protect others around you: Learn how to safely use, store and throw away your medicines at www.disposemymeds.org.          This list is accurate as of 10/15/18 11:15 AM.  Always use your most recent med list.                   Brand Name Dispense Instructions for use Diagnosis    aspirin 81 MG tablet      1 TABLET DAILY    Essential hypertension, benign       atenolol 25 MG tablet    TENORMIN    90 tablet    Take 1 tablet (25 mg) by mouth daily    Essential hypertension, benign       erythromycin ophthalmic ointment    ROMYCIN    1 g    Place 0.25 inches into the right eye 2 times daily    Hordeolum externum of right lower eyelid, Swelling of right eyelid       fluticasone 50 MCG/ACT spray    FLONASE    32 Bottle    Spray 1-2 sprays into both nostrils daily as needed for rhinitis    Chronic vasomotor rhinitis       lovastatin 40 MG tablet     MEVACOR    90 tablet    Take 1 tablet (40 mg) by mouth At Bedtime    Hyperlipidemia LDL goal <130       triamcinolone 0.5 % cream    KENALOG    30 g    Apply topically twice a day as needed to affected area. Call to refill.    Rash and other nonspecific skin eruption

## 2018-10-15 NOTE — PROGRESS NOTES

## 2019-01-07 ENCOUNTER — TELEPHONE (OUTPATIENT)
Dept: SURGERY | Facility: CLINIC | Age: 74
End: 2019-01-07

## 2019-01-07 NOTE — TELEPHONE ENCOUNTER
Reason for Call:  Other surgery    Detailed comments: Pt wants schedule surgery, please call    Phone Number Patient can be reached at: Cell number on file: [phone]       NONE       Best Time: today    Can we leave a detailed message on this number? YES    Call taken on 1/7/2019 at 1:57 PM by Nyasia Felipe

## 2019-04-11 ENCOUNTER — OFFICE VISIT (OUTPATIENT)
Dept: FAMILY MEDICINE | Facility: CLINIC | Age: 74
End: 2019-04-11
Payer: COMMERCIAL

## 2019-04-11 VITALS
HEART RATE: 72 BPM | BODY MASS INDEX: 28.79 KG/M2 | WEIGHT: 190 LBS | OXYGEN SATURATION: 96 % | DIASTOLIC BLOOD PRESSURE: 80 MMHG | SYSTOLIC BLOOD PRESSURE: 142 MMHG | HEIGHT: 68 IN | TEMPERATURE: 97.8 F | RESPIRATION RATE: 20 BRPM

## 2019-04-11 DIAGNOSIS — Z01.818 PREOP GENERAL PHYSICAL EXAM: Primary | ICD-10-CM

## 2019-04-11 LAB
BASOPHILS # BLD AUTO: 0 10E9/L (ref 0–0.2)
BASOPHILS NFR BLD AUTO: 0.3 %
CREAT SERPL-MCNC: 0.86 MG/DL (ref 0.66–1.25)
DIFFERENTIAL METHOD BLD: NORMAL
EOSINOPHIL # BLD AUTO: 0.1 10E9/L (ref 0–0.7)
EOSINOPHIL NFR BLD AUTO: 0.8 %
ERYTHROCYTE [DISTWIDTH] IN BLOOD BY AUTOMATED COUNT: 12.2 % (ref 10–15)
GFR SERPL CREATININE-BSD FRML MDRD: 84 ML/MIN/{1.73_M2}
HCT VFR BLD AUTO: 44.9 % (ref 40–53)
HGB BLD-MCNC: 15.3 G/DL (ref 13.3–17.7)
LYMPHOCYTES # BLD AUTO: 0.8 10E9/L (ref 0.8–5.3)
LYMPHOCYTES NFR BLD AUTO: 12.4 %
MCH RBC QN AUTO: 31 PG (ref 26.5–33)
MCHC RBC AUTO-ENTMCNC: 34.1 G/DL (ref 31.5–36.5)
MCV RBC AUTO: 91 FL (ref 78–100)
MONOCYTES # BLD AUTO: 0.9 10E9/L (ref 0–1.3)
MONOCYTES NFR BLD AUTO: 14.5 %
NEUTROPHILS # BLD AUTO: 4.5 10E9/L (ref 1.6–8.3)
NEUTROPHILS NFR BLD AUTO: 72 %
PLATELET # BLD AUTO: 166 10E9/L (ref 150–450)
RBC # BLD AUTO: 4.93 10E12/L (ref 4.4–5.9)
WBC # BLD AUTO: 6.3 10E9/L (ref 4–11)

## 2019-04-11 PROCEDURE — 99214 OFFICE O/P EST MOD 30 MIN: CPT | Performed by: FAMILY MEDICINE

## 2019-04-11 PROCEDURE — 93000 ELECTROCARDIOGRAM COMPLETE: CPT | Performed by: FAMILY MEDICINE

## 2019-04-11 PROCEDURE — 85025 COMPLETE CBC W/AUTO DIFF WBC: CPT | Performed by: FAMILY MEDICINE

## 2019-04-11 PROCEDURE — 82565 ASSAY OF CREATININE: CPT | Performed by: FAMILY MEDICINE

## 2019-04-11 PROCEDURE — 36415 COLL VENOUS BLD VENIPUNCTURE: CPT | Performed by: FAMILY MEDICINE

## 2019-04-11 ASSESSMENT — MIFFLIN-ST. JEOR: SCORE: 1567.36

## 2019-04-11 NOTE — PATIENT INSTRUCTIONS
Before Your Surgery      Call your surgeon if there is any change in your health. This includes signs of a cold or flu (such as a sore throat, runny nose, cough, rash or fever).    Do not smoke, drink alcohol or take over the counter medicine (unless your surgeon or primary care doctor tells you to) for the 24 hours before and after surgery.    If you take prescribed drugs: Follow your doctor s orders about which medicines to take and which to stop until after surgery.    Eating and drinking prior to surgery: follow the instructions from your surgeon    Take a shower or bath the night before surgery. Use the soap your surgeon gave you to gently clean your skin. If you do not have soap from your surgeon, use your regular soap. Do not shave or scrub the surgery site.  Wear clean pajamas and have clean sheets on your bed.           Thank you for choosing [facility].  You may be receiving an email and/or telephone survey request from [facility] [employer] regarding your visit today.  Please take a few minutes to respond to the survey to let us know how we are doing.      If you have questions or concerns, please contact us via [employer] or you can contact your care team at [phone].    Our Clinic hours are:  Monday 6:40 am  to 7:00 pm  Tuesday -Friday 6:40 am to 5:00 pm    The [place] outpatient lab hours are:  Monday - Friday 6:10 am to 4:45 pm  Saturdays 7:00 am to 11:00 am  Appointments are required, call [phone]    If you have clinical questions after hours or would like to schedule an appointment,  call the clinic at [phone].    DIAGNOSTICS:     EKG: appears normal, NSR, LAE, normal axis, normal intervals, no acute ST/T changes c/w ischemia, no LVH by voltage criteria, unchanged from previous tracings  Labs Drawn and in Process:   Unresulted Labs Ordered in the Past 30 Days of this Admission     No orders found from 2/10/2019 to 4/12/2019.          Recent Labs   Lab Test 11/18/13  100  11/15/12  0947    137   POTASSIUM 4.2 4.3   CR 0.85 0.91        IMPRESSION:   Reason for surgery/procedure: Velasquez Del Rio (: 1945) presents for pre-operative evaluation assessment as requested by Dr. Colindres.  He requires evaluation and anesthesia risk assessment prior to undergoing surgery/procedure for treatment of Non-recurrent bilateral inguinal hernia without obstruction, and an umbilical hernia. Proposed Surgery/ Procedure: Lap bilateral inguinal hernia repair and open umbilical hernia repair.    The proposed surgical procedure is considered LOW risk.    REVISED CARDIAC RISK INDEX  The patient has the following serious cardiovascular risks for perioperative complications such as (MI, PE, VFib and 3  AV Block):  No serious cardiac risks  INTERPRETATION: 0 risks: Class I (very low risk - 0.4% complication rate)    The patient has the following additional risks for perioperative complications:  No identified additional risks      ICD-10-CM    1. Preop general physical exam Z01.818 CBC with platelets differential     Creatinine     EKG 12-lead complete w/read - Clinics       RECOMMENDATIONS:       --Patient is to take all scheduled medications on the day before surgery EXCEPT for modifications listed below.  Stop aspirin and advil and aleve now. Tylenol is OK.   Your stomach should be empty for 8 hours before surgery.     APPROVAL GIVEN to proceed with proposed procedure, without further diagnostic evaluation

## 2019-04-15 ENCOUNTER — ANESTHESIA EVENT (OUTPATIENT)
Dept: SURGERY | Facility: CLINIC | Age: 74
End: 2019-04-15
Payer: COMMERCIAL

## 2019-04-15 ASSESSMENT — LIFESTYLE VARIABLES: TOBACCO_USE: 1

## 2019-04-15 NOTE — ANESTHESIA PREPROCEDURE EVALUATION
Anesthesia Evaluation     .             ROS/MED HX    ENT/Pulmonary:     (+)allergic rhinitis, tobacco use, Past use , . .    Neurologic:       Cardiovascular:     (+) Dyslipidemia, hypertension----. : . . . :. .       METS/Exercise Tolerance:     Hematologic:         Musculoskeletal:         GI/Hepatic:         Renal/Genitourinary:         Endo:         Psychiatric:         Infectious Disease:         Malignancy:         Other:                     Physical Exam  Normal systems: cardiovascular, pulmonary and dental    Airway   Mallampati: II  TM distance: >3 FB  Neck ROM: full    Dental     Cardiovascular       Pulmonary                         Anesthesia Plan      History & Physical Review  History and physical reviewed and following examination; no interval change.    ASA Status:  2 .    NPO Status:  > 6 hours    Plan for General and ETT with Intravenous induction. Maintenance will be Balanced.    PONV prophylaxis:  Ondansetron (or other 5HT-3) and Dexamethasone or Solumedrol  Additional equipment: Videolaryngoscope      Postoperative Care  Postoperative pain management:  IV analgesics.      Consents  Anesthetic plan, risks, benefits and alternatives discussed with:  Patient..                          .

## 2019-04-16 ENCOUNTER — ANESTHESIA (OUTPATIENT)
Dept: SURGERY | Facility: CLINIC | Age: 74
End: 2019-04-16
Payer: COMMERCIAL

## 2019-04-16 ENCOUNTER — HOSPITAL ENCOUNTER (OUTPATIENT)
Facility: CLINIC | Age: 74
Discharge: HOME | End: 2019-04-16
Attending: SURGERY | Admitting: SURGERY
Payer: COMMERCIAL

## 2019-04-16 VITALS
TEMPERATURE: 97.1 F | DIASTOLIC BLOOD PRESSURE: 61 MMHG | OXYGEN SATURATION: 93 % | BODY MASS INDEX: 29.82 KG/M2 | HEART RATE: 87 BPM | RESPIRATION RATE: 16 BRPM | WEIGHT: 190 LBS | SYSTOLIC BLOOD PRESSURE: 125 MMHG | HEIGHT: 67 IN

## 2019-04-16 DIAGNOSIS — N48.9 PENILE LESION: Primary | ICD-10-CM

## 2019-04-16 DIAGNOSIS — G89.18 POSTOPERATIVE PAIN: Primary | ICD-10-CM

## 2019-04-16 PROCEDURE — 71000012 ZZH RECOVERY PHASE 1 LEVEL 1 FIRST HR: Performed by: SURGERY

## 2019-04-16 PROCEDURE — 36000058 ZZH SURGERY LEVEL 3 EA 15 ADDTL MIN: Performed by: SURGERY

## 2019-04-16 PROCEDURE — 25000132 ZZH RX MED GY IP 250 OP 250 PS 637: Performed by: SURGERY

## 2019-04-16 PROCEDURE — 49650 LAP ING HERNIA REPAIR INIT: CPT | Mod: 50 | Performed by: PHYSICIAN ASSISTANT

## 2019-04-16 PROCEDURE — 36000056 ZZH SURGERY LEVEL 3 1ST 30 MIN: Performed by: SURGERY

## 2019-04-16 PROCEDURE — 49650 LAP ING HERNIA REPAIR INIT: CPT | Mod: 50 | Performed by: SURGERY

## 2019-04-16 PROCEDURE — 27210794 ZZH OR GENERAL SUPPLY STERILE: Performed by: SURGERY

## 2019-04-16 PROCEDURE — 25000132 ZZH RX MED GY IP 250 OP 250 PS 637: Performed by: NURSE ANESTHETIST, CERTIFIED REGISTERED

## 2019-04-16 PROCEDURE — 25000125 ZZHC RX 250: Performed by: SURGERY

## 2019-04-16 PROCEDURE — 40000305 ZZH STATISTIC PRE PROC ASSESS I: Performed by: SURGERY

## 2019-04-16 PROCEDURE — 25000128 H RX IP 250 OP 636: Performed by: NURSE ANESTHETIST, CERTIFIED REGISTERED

## 2019-04-16 PROCEDURE — 25000125 ZZHC RX 250: Performed by: NURSE ANESTHETIST, CERTIFIED REGISTERED

## 2019-04-16 PROCEDURE — 27110028 ZZH OR GENERAL SUPPLY NON-STERILE: Performed by: SURGERY

## 2019-04-16 PROCEDURE — 25800030 ZZH RX IP 258 OP 636: Performed by: NURSE ANESTHETIST, CERTIFIED REGISTERED

## 2019-04-16 PROCEDURE — 37000009 ZZH ANESTHESIA TECHNICAL FEE, EACH ADDTL 15 MIN: Performed by: SURGERY

## 2019-04-16 PROCEDURE — 25000128 H RX IP 250 OP 636: Performed by: SURGERY

## 2019-04-16 PROCEDURE — 37000008 ZZH ANESTHESIA TECHNICAL FEE, 1ST 30 MIN: Performed by: SURGERY

## 2019-04-16 PROCEDURE — 71000027 ZZH RECOVERY PHASE 2 EACH 15 MINS: Performed by: SURGERY

## 2019-04-16 PROCEDURE — C1781 MESH (IMPLANTABLE): HCPCS | Performed by: SURGERY

## 2019-04-16 DEVICE — MESH PROGRIP LAPAROSCOPIC 5.9X3.9" PARIETEX SELF-FIX LPG1510: Type: IMPLANTABLE DEVICE | Site: ABDOMEN | Status: FUNCTIONAL

## 2019-04-16 RX ORDER — DEXAMETHASONE SODIUM PHOSPHATE 4 MG/ML
INJECTION, SOLUTION INTRA-ARTICULAR; INTRALESIONAL; INTRAMUSCULAR; INTRAVENOUS; SOFT TISSUE PRN
Status: DISCONTINUED | OUTPATIENT
Start: 2019-04-16 | End: 2019-04-16

## 2019-04-16 RX ORDER — ONDANSETRON 2 MG/ML
4 INJECTION INTRAMUSCULAR; INTRAVENOUS EVERY 30 MIN PRN
Status: DISCONTINUED | OUTPATIENT
Start: 2019-04-16 | End: 2019-04-16

## 2019-04-16 RX ORDER — ONDANSETRON 4 MG/1
4 TABLET, ORALLY DISINTEGRATING ORAL EVERY 30 MIN PRN
Status: DISCONTINUED | OUTPATIENT
Start: 2019-04-16 | End: 2019-04-16 | Stop reason: HOSPADM

## 2019-04-16 RX ORDER — CEFAZOLIN SODIUM 2 G/100ML
2 INJECTION, SOLUTION INTRAVENOUS
Status: COMPLETED | OUTPATIENT
Start: 2019-04-16 | End: 2019-04-16

## 2019-04-16 RX ORDER — LIDOCAINE 40 MG/G
CREAM TOPICAL
Status: DISCONTINUED | OUTPATIENT
Start: 2019-04-16 | End: 2019-04-16 | Stop reason: HOSPADM

## 2019-04-16 RX ORDER — LIDOCAINE HYDROCHLORIDE 10 MG/ML
INJECTION, SOLUTION INFILTRATION; PERINEURAL PRN
Status: DISCONTINUED | OUTPATIENT
Start: 2019-04-16 | End: 2019-04-16

## 2019-04-16 RX ORDER — PROPOFOL 10 MG/ML
INJECTION, EMULSION INTRAVENOUS PRN
Status: DISCONTINUED | OUTPATIENT
Start: 2019-04-16 | End: 2019-04-16

## 2019-04-16 RX ORDER — ALBUTEROL SULFATE 0.83 MG/ML
2.5 SOLUTION RESPIRATORY (INHALATION) EVERY 4 HOURS PRN
Status: DISCONTINUED | OUTPATIENT
Start: 2019-04-16 | End: 2019-04-16

## 2019-04-16 RX ORDER — FENTANYL CITRATE 50 UG/ML
25-50 INJECTION, SOLUTION INTRAMUSCULAR; INTRAVENOUS
Status: DISCONTINUED | OUTPATIENT
Start: 2019-04-16 | End: 2019-04-16 | Stop reason: HOSPADM

## 2019-04-16 RX ORDER — MEPERIDINE HYDROCHLORIDE 25 MG/ML
12.5 INJECTION INTRAMUSCULAR; INTRAVENOUS; SUBCUTANEOUS
Status: DISCONTINUED | OUTPATIENT
Start: 2019-04-16 | End: 2019-04-16

## 2019-04-16 RX ORDER — NALOXONE HYDROCHLORIDE 0.4 MG/ML
.1-.4 INJECTION, SOLUTION INTRAMUSCULAR; INTRAVENOUS; SUBCUTANEOUS
Status: DISCONTINUED | OUTPATIENT
Start: 2019-04-16 | End: 2019-04-16 | Stop reason: HOSPADM

## 2019-04-16 RX ORDER — ALBUTEROL SULFATE 0.83 MG/ML
2.5 SOLUTION RESPIRATORY (INHALATION) EVERY 4 HOURS PRN
Status: DISCONTINUED | OUTPATIENT
Start: 2019-04-16 | End: 2019-04-16 | Stop reason: HOSPADM

## 2019-04-16 RX ORDER — CELECOXIB 200 MG/1
200 CAPSULE ORAL ONCE
Status: COMPLETED | OUTPATIENT
Start: 2019-04-16 | End: 2019-04-16

## 2019-04-16 RX ORDER — BUPIVACAINE HYDROCHLORIDE AND EPINEPHRINE 5; 5 MG/ML; UG/ML
INJECTION, SOLUTION PERINEURAL PRN
Status: DISCONTINUED | OUTPATIENT
Start: 2019-04-16 | End: 2019-04-16 | Stop reason: HOSPADM

## 2019-04-16 RX ORDER — HYDROCODONE BITARTRATE AND ACETAMINOPHEN 5; 325 MG/1; MG/1
1-2 TABLET ORAL EVERY 4 HOURS PRN
Status: DISCONTINUED | OUTPATIENT
Start: 2019-04-16 | End: 2019-04-16 | Stop reason: HOSPADM

## 2019-04-16 RX ORDER — SODIUM CHLORIDE, SODIUM LACTATE, POTASSIUM CHLORIDE, CALCIUM CHLORIDE 600; 310; 30; 20 MG/100ML; MG/100ML; MG/100ML; MG/100ML
INJECTION, SOLUTION INTRAVENOUS CONTINUOUS
Status: DISCONTINUED | OUTPATIENT
Start: 2019-04-16 | End: 2019-04-16

## 2019-04-16 RX ORDER — HYDROMORPHONE HYDROCHLORIDE 1 MG/ML
.3-.5 INJECTION, SOLUTION INTRAMUSCULAR; INTRAVENOUS; SUBCUTANEOUS EVERY 10 MIN PRN
Status: DISCONTINUED | OUTPATIENT
Start: 2019-04-16 | End: 2019-04-16

## 2019-04-16 RX ORDER — ONDANSETRON 4 MG/1
4 TABLET, ORALLY DISINTEGRATING ORAL EVERY 30 MIN PRN
Status: DISCONTINUED | OUTPATIENT
Start: 2019-04-16 | End: 2019-04-16

## 2019-04-16 RX ORDER — FENTANYL CITRATE 50 UG/ML
25-50 INJECTION, SOLUTION INTRAMUSCULAR; INTRAVENOUS
Status: DISCONTINUED | OUTPATIENT
Start: 2019-04-16 | End: 2019-04-16

## 2019-04-16 RX ORDER — SODIUM CHLORIDE, SODIUM LACTATE, POTASSIUM CHLORIDE, CALCIUM CHLORIDE 600; 310; 30; 20 MG/100ML; MG/100ML; MG/100ML; MG/100ML
INJECTION, SOLUTION INTRAVENOUS CONTINUOUS
Status: DISCONTINUED | OUTPATIENT
Start: 2019-04-16 | End: 2019-04-16 | Stop reason: HOSPADM

## 2019-04-16 RX ORDER — ACETAMINOPHEN 325 MG/1
975 TABLET ORAL ONCE
Status: COMPLETED | OUTPATIENT
Start: 2019-04-16 | End: 2019-04-16

## 2019-04-16 RX ORDER — ONDANSETRON 2 MG/ML
4 INJECTION INTRAMUSCULAR; INTRAVENOUS EVERY 30 MIN PRN
Status: DISCONTINUED | OUTPATIENT
Start: 2019-04-16 | End: 2019-04-16 | Stop reason: HOSPADM

## 2019-04-16 RX ORDER — HYDROMORPHONE HYDROCHLORIDE 1 MG/ML
.3-.5 INJECTION, SOLUTION INTRAMUSCULAR; INTRAVENOUS; SUBCUTANEOUS EVERY 10 MIN PRN
Status: DISCONTINUED | OUTPATIENT
Start: 2019-04-16 | End: 2019-04-16 | Stop reason: HOSPADM

## 2019-04-16 RX ORDER — CEFAZOLIN SODIUM 1 G/50ML
1 INJECTION, SOLUTION INTRAVENOUS SEE ADMIN INSTRUCTIONS
Status: DISCONTINUED | OUTPATIENT
Start: 2019-04-16 | End: 2019-04-16 | Stop reason: HOSPADM

## 2019-04-16 RX ORDER — EPHEDRINE SULFATE 50 MG/ML
INJECTION, SOLUTION INTRAMUSCULAR; INTRAVENOUS; SUBCUTANEOUS PRN
Status: DISCONTINUED | OUTPATIENT
Start: 2019-04-16 | End: 2019-04-16

## 2019-04-16 RX ORDER — FENTANYL CITRATE 50 UG/ML
INJECTION, SOLUTION INTRAMUSCULAR; INTRAVENOUS PRN
Status: DISCONTINUED | OUTPATIENT
Start: 2019-04-16 | End: 2019-04-16

## 2019-04-16 RX ORDER — MEPERIDINE HYDROCHLORIDE 25 MG/ML
12.5 INJECTION INTRAMUSCULAR; INTRAVENOUS; SUBCUTANEOUS
Status: DISCONTINUED | OUTPATIENT
Start: 2019-04-16 | End: 2019-04-16 | Stop reason: HOSPADM

## 2019-04-16 RX ORDER — ONDANSETRON 2 MG/ML
INJECTION INTRAMUSCULAR; INTRAVENOUS PRN
Status: DISCONTINUED | OUTPATIENT
Start: 2019-04-16 | End: 2019-04-16

## 2019-04-16 RX ORDER — HYDROCODONE BITARTRATE AND ACETAMINOPHEN 5; 325 MG/1; MG/1
1-2 TABLET ORAL EVERY 6 HOURS PRN
Qty: 45 TABLET | Refills: 0 | Status: SHIPPED | OUTPATIENT
Start: 2019-04-16 | End: 2019-05-01

## 2019-04-16 RX ORDER — NALOXONE HYDROCHLORIDE 0.4 MG/ML
.1-.4 INJECTION, SOLUTION INTRAMUSCULAR; INTRAVENOUS; SUBCUTANEOUS
Status: DISCONTINUED | OUTPATIENT
Start: 2019-04-16 | End: 2019-04-16

## 2019-04-16 RX ADMIN — DEXAMETHASONE SODIUM PHOSPHATE 4 MG: 4 INJECTION, SOLUTION INTRA-ARTICULAR; INTRALESIONAL; INTRAMUSCULAR; INTRAVENOUS; SOFT TISSUE at 08:52

## 2019-04-16 RX ADMIN — PHENYLEPHRINE HYDROCHLORIDE 200 MCG: 10 INJECTION, SOLUTION INTRAMUSCULAR; INTRAVENOUS; SUBCUTANEOUS at 08:58

## 2019-04-16 RX ADMIN — ROCURONIUM BROMIDE 10 MG: 10 INJECTION INTRAVENOUS at 09:25

## 2019-04-16 RX ADMIN — CEFAZOLIN SODIUM 2 G: 2 INJECTION, SOLUTION INTRAVENOUS at 08:42

## 2019-04-16 RX ADMIN — FENTANYL CITRATE 150 MCG: 50 INJECTION, SOLUTION INTRAMUSCULAR; INTRAVENOUS at 08:45

## 2019-04-16 RX ADMIN — FENTANYL CITRATE 100 MCG: 50 INJECTION, SOLUTION INTRAMUSCULAR; INTRAVENOUS at 08:42

## 2019-04-16 RX ADMIN — SODIUM CHLORIDE, POTASSIUM CHLORIDE, SODIUM LACTATE AND CALCIUM CHLORIDE: 600; 310; 30; 20 INJECTION, SOLUTION INTRAVENOUS at 08:14

## 2019-04-16 RX ADMIN — SUGAMMADEX 175 MG: 100 INJECTION, SOLUTION INTRAVENOUS at 09:48

## 2019-04-16 RX ADMIN — Medication 5 MG: at 09:17

## 2019-04-16 RX ADMIN — LIDOCAINE HYDROCHLORIDE 50 MG: 10 INJECTION, SOLUTION INFILTRATION; PERINEURAL at 08:45

## 2019-04-16 RX ADMIN — ACETAMINOPHEN 975 MG: 325 TABLET, FILM COATED ORAL at 08:19

## 2019-04-16 RX ADMIN — CELECOXIB 200 MG: 200 CAPSULE ORAL at 08:19

## 2019-04-16 RX ADMIN — HYDROMORPHONE HYDROCHLORIDE 0.5 MG: 1 INJECTION, SOLUTION INTRAMUSCULAR; INTRAVENOUS; SUBCUTANEOUS at 10:44

## 2019-04-16 RX ADMIN — LIDOCAINE HYDROCHLORIDE 1 ML: 10 INJECTION, SOLUTION EPIDURAL; INFILTRATION; INTRACAUDAL; PERINEURAL at 08:21

## 2019-04-16 RX ADMIN — HYDROCODONE BITARTRATE AND ACETAMINOPHEN 1 TABLET: 5; 325 TABLET ORAL at 11:36

## 2019-04-16 RX ADMIN — ROCURONIUM BROMIDE 20 MG: 10 INJECTION INTRAVENOUS at 08:45

## 2019-04-16 RX ADMIN — ROCURONIUM BROMIDE 20 MG: 10 INJECTION INTRAVENOUS at 09:19

## 2019-04-16 RX ADMIN — ONDANSETRON 4 MG: 2 INJECTION INTRAMUSCULAR; INTRAVENOUS at 08:52

## 2019-04-16 RX ADMIN — PROPOFOL 200 MG: 10 INJECTION, EMULSION INTRAVENOUS at 08:45

## 2019-04-16 RX ADMIN — MIDAZOLAM 2 MG: 1 INJECTION INTRAMUSCULAR; INTRAVENOUS at 08:42

## 2019-04-16 RX ADMIN — HYDROMORPHONE HYDROCHLORIDE 1 MG: 1 INJECTION, SOLUTION INTRAMUSCULAR; INTRAVENOUS; SUBCUTANEOUS at 09:25

## 2019-04-16 ASSESSMENT — MIFFLIN-ST. JEOR: SCORE: 1555.46

## 2019-04-16 NOTE — OP NOTE
Preop diagnosis: Bilateral inguinal hernias and umbilical hernia    Postop diagnosis: Same    Procedure: Laparoscopic bilateral inguinal hernia repair and open umbilical hernia repair    Surgeon: Jens    Assistant surgeon: Lawrence Zuleta PA-C (needed for expertise and camera operation retraction hemostasis and suctioning)    Anesthesia: General endotracheal Vicki CRNA    Procedure: Patient's lower abdomen was clipped of extraneous hair and cleaned and draped in a sterile manner.  1/2% Marcaine with epinephrine was used to anesthetize all skin incisions.  Small subumbilical curvilinear incision made and subcutaneous tissues dissected to fascia.  Anterior fascia of left rectus muscle open revealing muscle beneath.  Dissecting balloon trocar inserted into preperitoneal space and insufflated under direct vision.  This was removed and 10 mm trocar placed into preperitoneal space.  Carbon dioxide insufflated to 15 mmHg.  Patient placed into Trendelenburg position.  Under direct vision, two midline 5 mm trochars were also placed.  Attention was first turned to the midline.  The loose areolar tissue covering the pubic arch was  and this dissection continued laterally to the right pelvic sidewall musculature and then down to the psoas muscle.  The indirect hernia sac which was quite large was  from the cord structures and removed from the inguinal canal.  There was a medium size cord lipoma that was also removed from the canal.  A piece of Covidien pro- mesh was then used for repair.  It was unrolled from superior to inferior completely covering Hesselbach's triangle as well as the indirect defect.  The inferior margin of the mesh tucked easily under the parietal peritoneum.  Attention was then turned to the left side.  In a similar fashion the loose tissue was  to the left pelvic sidewall musculature and then down to the psoas muscle.  The direct sac in this case was removed from Hesselbach's  triangle and swept to the bottom of the operative field.  The canal was inspected for lipomas and there were none.  A second piece of pro- mesh was unrolled from superior to inferior crossing slightly at midline and completely covering Hesselbach's triangle as well as the inguinal canal.  Again the inferior margin of mesh tucked under the parietal peritoneum.  All trochars were removed under direct vision and the air allowed to desufflate.  The fascial defect of the subumbilical port was closed using an 0 Vicryl suture in a figure-of-eight fashion.  The umbilicus was then detached using electrocautery and the small hernia located.  It measured approximately 1 cm.  A small amount of extruded omentum was amputated using electrocautery.  The defect was closed using 4 interrupted 0 Prolene sutures and the umbilicus was reattached using a 3-0 Vicryl suture.  This fascia was then reinjected with Marcaine.  All wounds were irrigated with normal saline and the skin closed using 4-0 Vicryl running subcuticular stitches and dressed with Dermabond.    Estimated blood loss: 5 mL    IV fluid: 500 mils

## 2019-04-16 NOTE — ANESTHESIA POSTPROCEDURE EVALUATION
Patient: Velasquez Del Rio    Procedure(s):  Laparoscopic bilateral inguinal hernia repair and open umbilical hernia repair    Diagnosis:bilateral inguinal hernia and umbilical hernia  Diagnosis Additional Information: No value filed.    Anesthesia Type:  General, ETT    Note:  Anesthesia Post Evaluation    Patient location during evaluation: Bedside  Patient participation: Able to fully participate in evaluation  Level of consciousness: awake and alert  Pain management: adequate  Airway patency: patent  Cardiovascular status: acceptable  Respiratory status: acceptable  Hydration status: acceptable  PONV: none     Anesthetic complications: None          Last vitals:  Vitals:    04/16/19 1005 04/16/19 1010 04/16/19 1015   BP: 139/66 136/80 136/80   Pulse: 80  97   Resp: 16 13 9   Temp:  36.4  C (97.5  F)    SpO2: 98% 98% 97%         Electronically Signed By: Marcella Arambula CRNA, APRN CRNA  April 16, 2019  10:47 AM

## 2019-04-16 NOTE — DISCHARGE INSTRUCTIONS
DISCHARGE INSTRUCTIONS     1. You may resume your regular diet when you feel you are ready    2.  Minimize heavy lifting (greater than 30 pounds) for 1-2 weeks.  Then, okay to lift as pain allows.  Okay for physical activity such as cycling as long as pain allows.    3. You will have some discomfort at the incision sites. This is expected. This should improve over the next 2-3 days. Ice and pain medication will help with this pain. Use prescribed pain medication as instructed.     4. Some bruising and mild swelling is normal after surgery. The area below and around the incision(s) may be hard and elevated. This is part of the normal healing process. This will resolve slowly over the next several months.     5. Your wounds are covered with glue. The glue is water tight and so you can shower or bathe immediately following surgery.     6. Use the following medications (in addition to your normal meds) as shown:      Tylenol (acetaminophen) 500 mg every 6 hours as needed for pain.    Do not take more than 1000 mg of Tylenol every 6 hours -OR- 4g in a day    Motrin (ibuprophen) 600 mg every 6 hours as needed for pain. Take with food.     8. Notify Clinic at [phone] if:     Your discomfort is not relieved by your pain medication     You have signs of infection such as temperature above 100.4 degrees orally,  chills, or increasing daily discomfort.     Incision site is becoming more red and/or there is purulent drainage.      9. Follow up with Dr Colindres in 2 weeks.    10. When taking narcotics it is important to keep your stools soft to avoid constipation and pain with straining. This is best done by drinking and taking a stool softener (Metamucil or milk of magnesia).                          Same Day Surgery Discharge Instructions  Special Precautions After Surgery - Adult    1. It is not unusual to feel lightheaded or faint, up to 24 hours after surgery or while taking pain medication.  If you have these symptoms;  sit for a few minutes before standing and have someone assist you when getting up.  2. You should rest and relax for the next 24 hours and must have someone stay with you for at least 24 hours after your discharge.  3. DO NOT DRIVE any vehicle or operate mechanical equipment for 24 hours following the end of your surgery.  DO NOT DRIVE while taking narcotic pain medications that have been prescribed by your physician.  If you had a limb operated on, you must be able to use it fully to drive.  4. DO NOT drink alcoholic beverages for 24 hours following surgery or while taking prescription pain medication.  5. Drink clear liquids (apple juice, ginger ale, broth, 7-Up, etc.).  Progress to your regular diet as you feel able.  6. Any questions call your physician and do not make important decisions for 24 hours.    ACTIVITY  ? No lifting more than 30 pounds for 2 weeks.     INCISIONAL CARE  ? May bathe / shower after 24 hours.  ? Apply ice 1/2 hour on and 1/2 hour off while awake.  ? Be alert for signs of infection:  redness, swelling, heat, drainage of pus, and/or elevated temperature.  Contact your doctor if these occur.        Call for an appointment to return to the clinic 2 weeks.    Medications:  ? Hydrocodone (Norco, Vicodin):  Next dose: as needed or 6 hours from 1rst dose=.5:30 p.m.  ? Stool softeners to prevent constipation.  ? Follow the instructions on the bottle.     Additional discharge instructions:  See MD written instructions.  __________________________________________________________________________________________________________________________________  IMPORTANT NUMBERS:    [facility] Main Number:  [phone], [phone]  Pharmacy:  [phone]  Same Day Surgery:  [phone], Monday - Friday until 8:30 p.m.  Urgent Care:  [phone]  Emergency Room:  [phone]      [facility]:  [phone]                                                                             [facility]:  [phone] option 1  [facility]:  [phone]     [facility] Clinic:  [phone]   [facility]:  [phone]   [facility]: [phone]  [facility]:  [phone]

## 2019-04-16 NOTE — ANESTHESIA CARE TRANSFER NOTE
Patient: Velasquez Del Rio    Procedure(s):  Laparoscopic bilateral inguinal hernia repair and open umbilical hernia repair    Diagnosis: bilateral inguinal hernia and umbilical hernia  Diagnosis Additional Information: No value filed.    Anesthesia Type:   General, ETT     Note:  Airway :Face Mask  Patient transferred to:PACU  Handoff Report: Identifed the Patient, Identified the Reponsible Provider, Reviewed the pertinent medical history, Discussed the surgical course, Reviewed Intra-OP anesthesia mangement and issues during anesthesia, Set expectations for post-procedure period and Allowed opportunity for questions and acknowledgement of understanding      Vitals: (Last set prior to Anesthesia Care Transfer)    CRNA VITALS  4/16/2019 0932 - 4/16/2019 1008      4/16/2019             Pulse:  82    SpO2:  98 %    Resp Rate (observed):  3  (Abnormal)                 Electronically Signed By: DIGNA Ku CRNA  April 16, 2019  10:08 AM

## 2019-05-01 ENCOUNTER — OFFICE VISIT (OUTPATIENT)
Dept: SURGERY | Facility: CLINIC | Age: 74
End: 2019-05-01
Payer: COMMERCIAL

## 2019-05-01 VITALS
WEIGHT: 190 LBS | TEMPERATURE: 96.3 F | HEIGHT: 67 IN | HEART RATE: 75 BPM | RESPIRATION RATE: 18 BRPM | SYSTOLIC BLOOD PRESSURE: 152 MMHG | DIASTOLIC BLOOD PRESSURE: 85 MMHG | BODY MASS INDEX: 29.82 KG/M2

## 2019-05-01 DIAGNOSIS — Z09 POSTOP CHECK: Primary | ICD-10-CM

## 2019-05-01 PROCEDURE — 99024 POSTOP FOLLOW-UP VISIT: CPT | Performed by: SURGERY

## 2019-05-01 ASSESSMENT — MIFFLIN-ST. JEOR: SCORE: 1555.46

## 2019-05-01 NOTE — LETTER
5/1/2019         RE: Velasquez Del Rio  [address]  [facility] [address]        Dear Colleague,    Thank you for referring your patient, Velasquez Del Rio, to the [facility]. Please see a copy of my visit note below.    73-year-old male here for follow-up of his bilateral inguinal hernia repair.  Patient reports some discomfort on that right side with some bulging.  This is from a seroma and advised him that this would go down over time.  Patient is otherwise doing well.  He can return to normal activity and there are no lifting restrictions at this time.  I did discuss the mole that was on his penis and noted by my physician assistant after surgery.  He states that this is been there for about 5 years and somewhat irregularly-shaped.  I advised him that I placed a referral into dermatology and I recommend he contact them for an expert opinion.  Patient understood and will proceed as recommended.  Follow-up with me as necessary.    Mckinley Colindres MD     Again, thank you for allowing me to participate in the care of your patient.        Sincerely,        Mckinley Colindres MD

## 2019-05-01 NOTE — PROGRESS NOTES
73-year-old male here for follow-up of his bilateral inguinal hernia repair.  Patient reports some discomfort on that right side with some bulging.  This is from a seroma and advised him that this would go down over time.  Patient is otherwise doing well.  He can return to normal activity and there are no lifting restrictions at this time.  I did discuss the mole that was on his penis and noted by my physician assistant after surgery.  He states that this is been there for about 5 years and somewhat irregularly-shaped.  I advised him that I placed a referral into dermatology and I recommend he contact them for an expert opinion.  Patient understood and will proceed as recommended.  Follow-up with me as necessary.    Mckinley Colindres MD

## 2019-05-01 NOTE — NURSING NOTE
"Chief Complaint   Patient presents with     Surgical Followup     Laparoscopic bilateral inguinal hernia repair  DOS 4/16/19       Initial /85 (BP Location: Right arm, Patient Position: Chair, Cuff Size: Adult Regular)   Pulse 75   Temp 96.3  F (35.7  C) (Tympanic)   Resp 18   Ht 1.702 m (5' 7\")   Wt 86.2 kg (190 lb)   BMI 29.76 kg/m   Estimated body mass index is 29.76 kg/m  as calculated from the following:    Height as of this encounter: 1.702 m (5' 7\").    Weight as of this encounter: 86.2 kg (190 lb).  Medications and allergies reviewed.    Rabia ROMERO CMA    "

## 2019-05-07 ENCOUNTER — OFFICE VISIT (OUTPATIENT)
Dept: DERMATOLOGY | Facility: CLINIC | Age: 74
End: 2019-05-07
Payer: COMMERCIAL

## 2019-05-07 VITALS — OXYGEN SATURATION: 97 % | SYSTOLIC BLOOD PRESSURE: 142 MMHG | HEART RATE: 83 BPM | DIASTOLIC BLOOD PRESSURE: 78 MMHG

## 2019-05-07 DIAGNOSIS — L81.4 LENTIGO: Primary | ICD-10-CM

## 2019-05-07 DIAGNOSIS — L82.1 SEBORRHEIC KERATOSIS: ICD-10-CM

## 2019-05-07 DIAGNOSIS — D18.00 ANGIOMA: ICD-10-CM

## 2019-05-07 DIAGNOSIS — D23.9 DERMAL NEVUS: ICD-10-CM

## 2019-05-07 PROCEDURE — 99203 OFFICE O/P NEW LOW 30 MIN: CPT | Performed by: DERMATOLOGY

## 2019-05-07 NOTE — PROGRESS NOTES
Velasquez Del Rio , a 73 year old year old male patient, I was asked to see by Dr. Colindres for spot on penis.  Patient states this has been present for 5 years.  Patient reports the following symptoms:  bibe .  Patient reports the following previous treatments none.  Patient reports the following modifying factors none. Associated symptoms: none.  Patient has no other skin complaints today.  Remainder of the HPI, Meds, PMH, Allergies, FH, and SH was reviewed in chart.    No past medical history on file.    Past Surgical History:   Procedure Laterality Date     COLONOSCOPY      negative      COLONOSCOPY  2011    Procedure:COLONOSCOPY; Colonoscopy; Surgeon:DARCIE FISHER; Location:[facility]     COLONOSCOPY N/A 8/10/2016    Procedure: COLONOSCOPY;  Surgeon: Morales Renteria MD;  Location: [facility]     LAPAROSCOPIC HERNIORRHAPHY INGUINAL BILATERAL Bilateral 2019    Procedure: Laparoscopic bilateral inguinal hernia repair and open umbilical hernia repair;  Surgeon: Mckinley Colindres MD;  Location: [facility]        Family History   Problem Relation Age of Onset     Hypertension Mother      Cerebrovascular Disease Mother          of a stroke     Cancer - colorectal Mother         age 70s     Prostate Cancer Father         had prostate removed     Diabetes Maternal Grandmother      Cancer Brother         bladder cancer     Colon Cancer Daughter 53        colon cancer surger     Breast Cancer Daughter      Asthma No family hx of      C.A.D. No family hx of        Social History     Socioeconomic History     Marital status:      Spouse name: Not on file     Number of children: Not on file     Years of education: Not on file     Highest education level: Not on file   Occupational History     Occupation: Construction/carpentry     Employer: SELF   Social Needs     Financial resource strain: Not on file     Food insecurity:     Worry: Not on file     Inability: Not on file     Transportation needs:     Medical:  Not on file     Non-medical: Not on file   Tobacco Use     Smoking status: Former Smoker     Packs/day: 1.00     Years: 7.00     Pack years: 7.00     Types: Cigarettes     Last attempt to quit: 1968     Years since quittin.5     Smokeless tobacco: Never Used   Substance and Sexual Activity     Alcohol use: Yes     Comment: minimal     Drug use: No     Sexual activity: Not on file   Lifestyle     Physical activity:     Days per week: Not on file     Minutes per session: Not on file     Stress: Not on file   Relationships     Social connections:     Talks on phone: Not on file     Gets together: Not on file     Attends Restoration service: Not on file     Active member of club or organization: Not on file     Attends meetings of clubs or organizations: Not on file     Relationship status: Not on file     Intimate partner violence:     Fear of current or ex partner: Not on file     Emotionally abused: Not on file     Physically abused: Not on file     Forced sexual activity: Not on file   Other Topics Concern      Service Not Asked     Blood Transfusions Not Asked     Caffeine Concern Yes     Comment: occasional     Occupational Exposure Not Asked     Hobby Hazards Not Asked     Sleep Concern Not Asked     Stress Concern Not Asked     Weight Concern Not Asked     Special Diet Not Asked     Back Care Not Asked     Exercise Yes     Comment: stretch exercises     Bike Helmet Not Asked     Seat Belt Yes     Self-Exams No     Parent/sibling w/ CABG, MI or angioplasty before 65F 55M? No   Social History Narrative     Not on file       Outpatient Encounter Medications as of 2019   Medication Sig Dispense Refill     Acetaminophen (TYLENOL PO)        ASPIRIN 81 MG OR TABS 1 TABLET DAILY       atenolol (TENORMIN) 25 MG tablet Take 1 tablet (25 mg) by mouth daily 90 tablet 3     fluticasone (FLONASE) 50 MCG/ACT spray Spray 1-2 sprays into both nostrils daily as needed for rhinitis 32 Bottle 11     IBUPROFEN  PO        lovastatin (MEVACOR) 40 MG tablet Take 1 tablet (40 mg) by mouth At Bedtime 90 tablet 3     triamcinolone (KENALOG) 0.5 % cream Apply topically twice a day as needed to affected area. Call to refill. 30 g 3     No facility-administered encounter medications on file as of 5/7/2019.              Review Of Systems  Skin: As above  Eyes: negative  Ears/Nose/Throat: negative  Respiratory: No shortness of breath, dyspnea on exertion, cough, or hemoptysis  Cardiovascular: negative  Gastrointestinal: negative  Genitourinary: negative  Musculoskeletal: negative  Neurologic: negative  Psychiatric: negative  Hematologic/Lymphatic/Immunologic: negative  Endocrine: negative      O:   NAD, WDWN, Alert & Oriented, Mood & Affect wnl, Vitals stable   Here today alone   There were no vitals taken for this visit.   General appearance rachel ii   Vitals stale   Alert, oriented and in no acute distress      Following lymph nodes palpated: Occipital, Cervical, Supraclavicular no lad   R penile shaft stuck on papule    Stuck on papules and brown macules on trunk and ext    Red papules on trunk   Flesh colored papules on trunk      The remainder of the full exam was unremarkable; the following areas were examined:  conjunctiva/lids, oral mucosa, neck, peripheral vascular system, abdomen, lymph nodes, digits/nails, eccrine and apocrine glands, scalp/hair, face, neck, chest, abdomen, buttocks, back, RUE, LUE, RLE, LLE       Eyes: Conjunctivae/lids:Normal     ENT: Lips, buccal mucosa, tongue: normal    MSK:Normal    Cardiovascular: peripheral edema none    Pulm: Breathing Normal    Lymph Nodes: No Head and Neck Lymphadenopathy     Neuro/Psych: Orientation:Normal; Mood/Affect:Normal      A/P:  1. Seborrheic keratosis, letnigo, angioma, dermal nevus   BENIGN LESIONS DISCUSSED WITH PATIENT:  I discussed the specifics of tumor, prognosis, and genetics of benign lesions.  I explained that treatment of these lesions would be purely cosmetic  and not medically neccessary.  I discussed with patient different removal options including excision, cautery and /or laser.      Nature and genetics of benign skin lesions dicussed with patient.  Signs and Symptoms of skin cancer discussed with patient.  ABCDEs of melanoma reviewed with patient.  Patient encouraged to perform monthly skin exams.  UV precautions reviewed with patient.  Patient to follow up with Primary Care provider regarding elevated blood pressure.    Skin care regimen reviewed with patient: Eliminate harsh soaps, i.e. Dial, zest, irsih spring; Mild soaps such as Cetaphil or Dove sensitive skin, avoid hot or cold showers, aggressive use of emollients including vanicream, cetaphil or cerave discussed with patient.    Risks of non-melanoma skin cancer discussed with patient   Return to clinic 12 months  Jung to follow up with Primary Care provider regarding elevated blood pressure.

## 2019-05-07 NOTE — NURSING NOTE
"Initial /78   Pulse 83   SpO2 97%  Estimated body mass index is 29.76 kg/m  as calculated from the following:    Height as of 5/1/19: 1.702 m (5' 7\").    Weight as of 5/1/19: 86.2 kg (190 lb). .    Cynthia Ness LPN    "

## 2019-05-07 NOTE — LETTER
2019         RE: Velasquez Del Rio  [address]  [facility] [address]        Dear Colleague,    Thank you for referring your patient, Velasquez Del Rio, to the [facility]. Please see a copy of my visit note below.    Velasquez Del Rio , a 73 year old year old male patient, I was asked to see by Dr. Colindres for spot on penis.  Patient states this has been present for 5 years.  Patient reports the following symptoms:  bibe .  Patient reports the following previous treatments none.  Patient reports the following modifying factors none. Associated symptoms: none.  Patient has no other skin complaints today.  Remainder of the HPI, Meds, PMH, Allergies, FH, and SH was reviewed in chart.    No past medical history on file.    Past Surgical History:   Procedure Laterality Date     COLONOSCOPY      negative      COLONOSCOPY  2011    Procedure:COLONOSCOPY; Colonoscopy; Surgeon:DARCIE FISHER; Location:[facility]     COLONOSCOPY N/A 8/10/2016    Procedure: COLONOSCOPY;  Surgeon: Morales Renteria MD;  Location: [facility]     LAPAROSCOPIC HERNIORRHAPHY INGUINAL BILATERAL Bilateral 2019    Procedure: Laparoscopic bilateral inguinal hernia repair and open umbilical hernia repair;  Surgeon: Mckinley Colindres MD;  Location: [facility]        Family History   Problem Relation Age of Onset     Hypertension Mother      Cerebrovascular Disease Mother          of a stroke     Cancer - colorectal Mother         age 70s     Prostate Cancer Father         had prostate removed     Diabetes Maternal Grandmother      Cancer Brother         bladder cancer     Colon Cancer Daughter 53        colon cancer surger     Breast Cancer Daughter      Asthma No family hx of      C.A.D. 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   Social Needs     Financial resource strain: Not on file     Food insecurity:     Worry: Not on file     Inability: Not on file     Transportation needs:     Medical: Not on file     Non-medical: Not on file   Tobacco Use     Smoking status: Former Smoker     Packs/day: 1.00     Years: 7.00     Pack years: 7.00     Types: Cigarettes     Last attempt to quit: 1968     Years since quittin.5     Smokeless tobacco: Never Used   Substance and Sexual Activity     Alcohol use: Yes     Comment: minimal     Drug use: No     Sexual activity: Not on file   Lifestyle     Physical activity:     Days per week: Not on file     Minutes per session: Not on file     Stress: Not on file   Relationships     Social connections:     Talks on phone: Not on file     Gets together: Not on file     Attends [employer] service: Not on file     Active member of club or organization: Not on file     Attends meetings of clubs or organizations: Not on file     Relationship status: Not on file     Intimate partner violence:     Fear of current or ex partner: Not on file     Emotionally abused: Not on file     Physically abused: Not on file     Forced sexual activity: Not on file   Other Topics Concern      Service Not Asked     Blood Transfusions Not Asked     Caffeine Concern Yes     Comment: occasional     Occupational Exposure Not Asked     Hobby Hazards Not Asked     Sleep Concern Not Asked     Stress Concern Not Asked     Weight Concern Not Asked     Special Diet Not Asked     Back Care Not Asked     Exercise Yes     Comment: stretch exercises     Bike Helmet Not Asked     Seat Belt Yes     Self-Exams No     Parent/sibling w/ CABG, MI or angioplasty before 65F 55M? No   Social History Narrative     Not on file       Outpatient Encounter Medications as of 2019   Medication Sig Dispense Refill     Acetaminophen (TYLENOL PO)        ASPIRIN 81  MG OR TABS 1 TABLET DAILY       atenolol (TENORMIN) 25 MG tablet Take 1 tablet (25 mg) by mouth daily 90 tablet 3     fluticasone (FLONASE) 50 MCG/ACT spray Spray 1-2 sprays into both nostrils daily as needed for rhinitis 32 Bottle 11     IBUPROFEN PO        lovastatin (MEVACOR) 40 MG tablet Take 1 tablet (40 mg) by mouth At Bedtime 90 tablet 3     triamcinolone (KENALOG) 0.5 % cream Apply topically twice a day as needed to affected area. Call to refill. 30 g 3     No facility-administered encounter medications on file as of 5/7/2019.              Review Of Systems  Skin: As above  Eyes: negative  Ears/Nose/Throat: negative  Respiratory: No shortness of breath, dyspnea on exertion, cough, or hemoptysis  Cardiovascular: negative  Gastrointestinal: negative  Genitourinary: negative  Musculoskeletal: negative  Neurologic: negative  Psychiatric: negative  Hematologic/Lymphatic/Immunologic: negative  Endocrine: negative      O:   NAD, WDWN, Alert & Oriented, Mood & Affect wnl, Vitals stable   Here today alone   There were no vitals taken for this visit.   General appearance rachel ii   Vitals stale   Alert, oriented and in no acute distress      Following lymph nodes palpated: Occipital, Cervical, Supraclavicular no lad   R penile shaft stuck on papule    Stuck on papules and brown macules on trunk and ext    Red papules on trunk   Flesh colored papules on trunk      The remainder of the full exam was unremarkable; the following areas were examined:  conjunctiva/lids, oral mucosa, neck, peripheral vascular system, abdomen, lymph nodes, digits/nails, eccrine and apocrine glands, scalp/hair, face, neck, chest, abdomen, buttocks, back, RUE, LUE, RLE, LLE       Eyes: Conjunctivae/lids:Normal     ENT: Lips, buccal mucosa, tongue: normal    MSK:Normal    Cardiovascular: peripheral edema none    Pulm: Breathing Normal    Lymph Nodes: No Head and Neck Lymphadenopathy     Neuro/Psych: Orientation:Normal;  Mood/Affect:Normal      A/P:  1. Seborrheic keratosis, letnigo, angioma, dermal nevus   BENIGN LESIONS DISCUSSED WITH PATIENT:  I discussed the specifics of tumor, prognosis, and genetics of benign lesions.  I explained that treatment of these lesions would be purely cosmetic and not medically neccessary.  I discussed with patient different removal options including excision, cautery and /or laser.      Nature and genetics of benign skin lesions dicussed with patient.  Signs and Symptoms of skin cancer discussed with patient.  ABCDEs of melanoma reviewed with patient.  Patient encouraged to perform monthly skin exams.  UV precautions reviewed with patient.  Patient to follow up with Primary Care provider regarding elevated blood pressure.    Skin care regimen reviewed with patient: Eliminate harsh soaps, i.e. Dial, zest, irsih spring; Mild soaps such as Cetaphil or Dove sensitive skin, avoid hot or cold showers, aggressive use of emollients including vanicream, cetaphil or cerave discussed with patient.    Risks of non-melanoma skin cancer discussed with patient   Return to clinic 12 months  Jung to follow up with Primary Care provider regarding elevated blood pressure.      Again, thank you for allowing me to participate in the care of your patient.        Sincerely,        Freeman Berry MD

## 2019-08-01 ENCOUNTER — OFFICE VISIT (OUTPATIENT)
Dept: FAMILY MEDICINE | Facility: CLINIC | Age: 74
End: 2019-08-01
Payer: COMMERCIAL

## 2019-08-01 VITALS
OXYGEN SATURATION: 96 % | DIASTOLIC BLOOD PRESSURE: 84 MMHG | SYSTOLIC BLOOD PRESSURE: 134 MMHG | RESPIRATION RATE: 16 BRPM | BODY MASS INDEX: 28.56 KG/M2 | WEIGHT: 182 LBS | TEMPERATURE: 97.8 F | HEIGHT: 67 IN | HEART RATE: 65 BPM

## 2019-08-01 DIAGNOSIS — J30.0 CHRONIC VASOMOTOR RHINITIS: ICD-10-CM

## 2019-08-01 DIAGNOSIS — I10 ESSENTIAL HYPERTENSION, BENIGN: ICD-10-CM

## 2019-08-01 DIAGNOSIS — H61.23 BILATERAL IMPACTED CERUMEN: ICD-10-CM

## 2019-08-01 DIAGNOSIS — Z12.5 SCREENING FOR PROSTATE CANCER: ICD-10-CM

## 2019-08-01 DIAGNOSIS — R21 RASH AND OTHER NONSPECIFIC SKIN ERUPTION: ICD-10-CM

## 2019-08-01 DIAGNOSIS — E78.5 HYPERLIPIDEMIA LDL GOAL <130: Primary | ICD-10-CM

## 2019-08-01 LAB
ALT SERPL W P-5'-P-CCNC: 23 U/L (ref 0–70)
CHOLEST SERPL-MCNC: 167 MG/DL
GLUCOSE SERPL-MCNC: 90 MG/DL (ref 70–99)
HDLC SERPL-MCNC: 50 MG/DL
LDLC SERPL CALC-MCNC: 85 MG/DL
NONHDLC SERPL-MCNC: 117 MG/DL
PSA SERPL-ACNC: 2.1 UG/L (ref 0–4)
TRIGL SERPL-MCNC: 159 MG/DL

## 2019-08-01 PROCEDURE — 84460 ALANINE AMINO (ALT) (SGPT): CPT | Performed by: FAMILY MEDICINE

## 2019-08-01 PROCEDURE — 82947 ASSAY GLUCOSE BLOOD QUANT: CPT | Performed by: FAMILY MEDICINE

## 2019-08-01 PROCEDURE — 99214 OFFICE O/P EST MOD 30 MIN: CPT | Performed by: FAMILY MEDICINE

## 2019-08-01 PROCEDURE — 80061 LIPID PANEL: CPT | Performed by: FAMILY MEDICINE

## 2019-08-01 PROCEDURE — G0103 PSA SCREENING: HCPCS | Performed by: FAMILY MEDICINE

## 2019-08-01 PROCEDURE — 36415 COLL VENOUS BLD VENIPUNCTURE: CPT | Performed by: FAMILY MEDICINE

## 2019-08-01 RX ORDER — FLUTICASONE PROPIONATE 50 MCG
1-2 SPRAY, SUSPENSION (ML) NASAL DAILY PRN
Qty: 17 G | Refills: 3 | Status: SHIPPED | OUTPATIENT
Start: 2019-08-01 | End: 2020-08-12

## 2019-08-01 RX ORDER — TRIAMCINOLONE ACETONIDE 5 MG/G
CREAM TOPICAL
Qty: 30 G | Refills: 3 | Status: SHIPPED | OUTPATIENT
Start: 2019-08-01 | End: 2020-08-12

## 2019-08-01 RX ORDER — ATENOLOL 25 MG/1
25 TABLET ORAL DAILY
Qty: 90 TABLET | Refills: 3 | Status: SHIPPED | OUTPATIENT
Start: 2019-08-01 | End: 2020-08-12

## 2019-08-01 RX ORDER — LOVASTATIN 40 MG
40 TABLET ORAL AT BEDTIME
Qty: 90 TABLET | Refills: 3 | Status: SHIPPED | OUTPATIENT
Start: 2019-08-01 | End: 2020-08-12

## 2019-08-01 ASSESSMENT — MIFFLIN-ST. JEOR: SCORE: 1519.18

## 2019-08-01 NOTE — PATIENT INSTRUCTIONS
(E78.5) Hyperlipidemia LDL goal <130  (primary encounter diagnosis)  Comment:   Plan: ALT, Lipid panel reflex to direct LDL Fasting,         Glucose, lovastatin (MEVACOR) 40 MG tablet        Use the lower chol diet and daily exercise. Do the fasting labs today. We will call the results.   Refills are done on the meds for one year.     (Z12.5) Screening for prostate cancer  Comment:   Plan: Prostate spec antigen screen        Do the PSA test and we will call the results. Monitor for the symptoms.     (I10) Essential hypertension, benign  Comment:   Plan: atenolol (TENORMIN) 25 MG tablet        Monitor and record the BP and take the med daily and refill and recheck annually if doing well.     (R21) Rash and other nonspecific skin eruption  Comment:   Plan: triamcinolone (ARISTOCORT HP) 0.5 % external         cream        Instructions given on diagnoses and use the med as discussed.     (J30.0) Chronic vasomotor rhinitis  Comment:   Plan: fluticasone (FLONASE) 50 MCG/ACT nasal spray        Instructions given on diagnoses   and refills are done for one year.

## 2019-08-01 NOTE — LETTER
August 2, 2019      Velasquez Del Rio  [address] [facility] [address]        Dear ,    We are writing to inform you of your test results.    Your test results fall within the expected range(s).    Resulted Orders   ALT   Result Value Ref Range    ALT 23 0 - 70 U/L   Lipid panel reflex to direct LDL Fasting   Result Value Ref Range    Cholesterol 167 <200 mg/dL    Triglycerides 159 (H) <150 mg/dL      Comment:      Borderline high:  150-199 mg/dl  High:             200-499 mg/dl  Very high:       >499 mg/dl  Fasting specimen      HDL Cholesterol 50 >39 mg/dL    LDL Cholesterol Calculated 85 <100 mg/dL      Comment:      Desirable:       <100 mg/dl    Non HDL Cholesterol 117 <130 mg/dL   Glucose   Result Value Ref Range    Glucose 90 70 - 99 mg/dL      Comment:      Fasting specimen   Prostate spec antigen screen   Result Value Ref Range    PSA 2.10 0 - 4 ug/L      Comment:      Assay Method:  Chemiluminescence using Siemens Vista analyzer         If you have any questions or concerns, please call the clinic at the number listed above.       Sincerely,        Cecilio Diaz MD

## 2019-08-01 NOTE — PROGRESS NOTES
Subjective     Velasquez Del Rio is a 73 year old male who presents to clinic today for the following health issues:    HPI   Hyperlipidemia Follow-Up      Are you having any of the following symptoms? (Select all that apply)  No complaints of shortness of breath, chest pain or pressure.  No increased sweating or nausea with activity.  No left-sided neck or arm pain.  No complaints of pain in calves when walking 1-2 blocks.    Are you regularly taking any medication or supplement to lower your cholesterol?   Yes- Lovastatin.    Are you having muscle aches or other side effects that you think could be caused by your cholesterol lowering medication?  No   Lab Results   Component Value Date    CHOL 150 07/27/2018     Lab Results   Component Value Date    HDL 47 07/27/2018     Lab Results   Component Value Date    LDL 73 07/27/2018     Lab Results   Component Value Date    TRIG 151 07/27/2018     Lab Results   Component Value Date    CHOLHDLRATIO 3.7 08/24/2015       Hypertension Follow-up  He would like to have his blood pressure machines compared today.  Clinic reading is 134/84, 65.  His first machine is 149/84, 65.  His second machine 143/90, 64.  When rechecking with the manual clinic blood pressure cuff after using both his machines, the clinic reading was 146/84.        Do you check your blood pressure regularly outside of the clinic? Yes, on occasion.  2-3 times per year.    Are you following a low salt diet? Yes, not adding salt.    Are your blood pressures ever more than 140 on the top number (systolic) OR more   than 90 on the bottom number (diastolic), for example 140/90? Yes, 148/88 when checking this am.  Then he sat for awhile and rechecked ant it was 130/78.      Amount of exercise or physical activity: Stretch exercises, sit ups, stationary bike when at home, biking when in [place].    Problems taking medications regularly: No    Medication side effects: none    Diet: low fat/cholesterol-not following as  "much and no added salt.      Medication Followup of Flonase Nasal Spray    Taking Medication as prescribed: yes, as needed.      Side Effects:  None    Medication Helping Symptoms:  Yes, will need to use this during certain seasons.  Finds he may use this more in [place] due to the dry weather.       Medication Followup of Kenalog Cream    Taking Medication as prescribed: yes    Side Effects:  None    Medication Helping Symptoms:  Yes, will only use as needed.  Has not used in the past three months.     EARS:  He would like to have his ears checked.  They can sometimes feel itchy.  Not sure if related to wax.  Decrease in hearing when his wife is talking.      Current Outpatient Medications:      ASPIRIN 81 MG OR TABS, 1 TABLET DAILY, Disp: , Rfl:      atenolol (TENORMIN) 25 MG tablet, Take 1 tablet (25 mg) by mouth daily, Disp: 90 tablet, Rfl: 3     fluticasone (FLONASE) 50 MCG/ACT spray, Spray 1-2 sprays into both nostrils daily as needed for rhinitis, Disp: 32 Bottle, Rfl: 11     lovastatin (MEVACOR) 40 MG tablet, Take 1 tablet (40 mg) by mouth At Bedtime, Disp: 90 tablet, Rfl: 3     triamcinolone (KENALOG) 0.5 % cream, Apply topically twice a day as needed to affected area. Call to refill., Disp: 30 g, Rfl: 3    Patient Active Problem List   Diagnosis     Benign neoplasm of colon     Hyperlipidemia LDL goal <130     Advanced directives, counseling/discussion     Health Care Home     Herpes zoster     Diverticulosis of large intestine     Family history of colon cancer     Family history of malignant neoplasm of breast     Essential hypertension with goal blood pressure less than 140/90     Dermatitis     Chronic vasomotor rhinitis       Blood pressure 134/84, pulse 65, temperature 97.8  F (36.6  C), temperature source Tympanic, resp. rate 16, height 1.702 m (5' 7\"), weight 82.6 kg (182 lb), SpO2 96 %.    Exam:  GENERAL APPEARANCE: healthy, alert and no distress  EYES: EOMI,  PERRL  ENT: bilateral cerumen. "   NECK: no adenopathy, no asymmetry, masses, or scars and thyroid normal to palpation  RESP: lungs clear to auscultation - no rales, rhonchi or wheezes  CV: regular rates and rhythm, normal S1 S2, no S3 or S4 and no murmur, click or rub -  PSYCH: mentation appears normal and affect normal/bright      (E78.5) Hyperlipidemia LDL goal <130  (primary encounter diagnosis)  Comment:   Plan: ALT, Lipid panel reflex to direct LDL Fasting,         Glucose, lovastatin (MEVACOR) 40 MG tablet        Use the lower chol diet and daily exercise. Do the fasting labs today. We will call the results.   Refills are done on the meds for one year.     (Z12.5) Screening for prostate cancer  Comment:   Plan: Prostate spec antigen screen        Do the PSA test and we will call the results. Monitor for the symptoms.     (I10) Essential hypertension, benign  Comment:   Plan: atenolol (TENORMIN) 25 MG tablet        Monitor and record the BP and take the med daily and refill and recheck annually if doing well.     (R21) Rash and other nonspecific skin eruption  Comment:   Plan: triamcinolone (ARISTOCORT HP) 0.5 % external         cream        Instructions given on diagnoses and use the med as discussed.     (J30.0) Chronic vasomotor rhinitis  Comment:   Plan: fluticasone (FLONASE) 50 MCG/ACT nasal spray        Instructions given on diagnoses   and refills are done for one year.     (H61.23) Bilateral impacted cerumen  Comment:   Plan: REMOVE IMPACTED CERUMEN        We discussed the options and will irrigate the ears bilaterally and prevention discussed.           Cecilio Diaz

## 2019-08-09 ENCOUNTER — ALLIED HEALTH/NURSE VISIT (OUTPATIENT)
Dept: FAMILY MEDICINE | Facility: CLINIC | Age: 74
End: 2019-08-09
Payer: COMMERCIAL

## 2019-08-09 DIAGNOSIS — H61.23 BILATERAL IMPACTED CERUMEN: Primary | ICD-10-CM

## 2019-08-09 PROCEDURE — 99207 ZZC NO CHARGE NURSE ONLY: CPT

## 2019-08-09 NOTE — NURSING NOTE
Patient identified using two patient identifiers.  Ear exam showing wax occlusion completed by provider (Emily).  Solution: warm water was placed in the bilateral ear(s) via irrigation tool: elephant ear.    Elyse Chaparro on 8/9/2019 at 2:33 PM

## 2019-09-17 ENCOUNTER — IMMUNIZATION (OUTPATIENT)
Dept: FAMILY MEDICINE | Facility: CLINIC | Age: 74
End: 2019-09-17
Payer: COMMERCIAL

## 2019-09-17 DIAGNOSIS — Z23 NEED FOR PROPHYLACTIC VACCINATION AND INOCULATION AGAINST INFLUENZA: Primary | ICD-10-CM

## 2019-09-17 PROCEDURE — G0008 ADMIN INFLUENZA VIRUS VAC: HCPCS

## 2019-09-17 PROCEDURE — 90662 IIV NO PRSV INCREASED AG IM: CPT

## 2019-09-17 PROCEDURE — 99207 ZZC NO CHARGE NURSE ONLY: CPT

## 2020-04-14 ENCOUNTER — NURSE TRIAGE (OUTPATIENT)
Dept: NURSING | Facility: CLINIC | Age: 75
End: 2020-04-14

## 2020-04-14 NOTE — TELEPHONE ENCOUNTER
75 y/o male who took his Lovastatin pill tonight and feels like it is stuck in his throat, he has tried water, crackers, and bread but it is not moving.  RN reviewed protocols advised hot tea or water to dissolve it, Maalox or MOM to move it/help with sensation or indigestion, as well per [facility] protocol.  Call back if no movement, should be seen in the next 24 hours.  May also need to rest throat as he has been coughing and clearing throat to pass the pill and might have some inflammation that feels like the pill is still there.    Haydee Lopez RN - [facility] Nurse Advisor  04/14/20   12:15AM    Reason for Disposition    [1] Symptoms of pill stuck in throat or esophagus (e.g., pain in throat or chest, FB sensation) AND [2] no relief after using CARE ADVICE    Additional Information    Negative: Any difficulty breathing (e.g., coughing, wheezing or stridor)    Negative: Sounds like a life-threatening emergency to the triager    Negative: Symptoms of blocked esophagus (e.g., can't swallow normal secretions, drooling)    Negative: Symptoms of FB stuck in throat or esophagus (e.g., continued pain in throat or chest, FB sensation, blood-tinged saliva) (Exception: pill stuck)    Negative: Can't swallow water or bread    Negative: Sharp object  (e.g., needle, nail, safety pin, toothpick, bone, bottle cap, pull tab, dental bridge work)     (Exception: tiny chips of glass generally pass without any symptoms)    Negative: Battery of any type    Negative: Magnet    Negative: Packet of drugs (e.g., condom filled with cocaine)    Negative: Swallowed 2 or more FBs (e.g., multiple objects)    Negative: Swallowed a poisonous object (e.g., a lead sinker or a bullet)    Negative: Swallowed a (non-food) FB on purpose    Negative: SEVERE symptoms of pill stuck in throat or esophagus (e.g., severe pain, bleeding, or inability to swallow liquids)    Negative: Blood in the stools    Negative: [1] Abdominal pain AND [2] FB hasn't  passed    Negative: [1] Vomited 2 or more times AND [2] FB hasn't passed    Negative: Patient sounds very sick or weak to the triager    Negative: Patient is confused or is an unreliable provider of information (e.g., dementia, profound mental retardation, alcohol intoxication)    Protocols used: SWALLOWED FOREIGN BODY-A-AH      COVID 19 Nurse Triage Plan/Patient Instructions    Please be aware that novel coronavirus (COVID-19) may be circulating in the community. If you develop symptoms such as fever, cough, or SOB or if you have concerns about the presence of another infection including coronavirus (COVID-19), please contact your health care provider or visit [email or web address].     Disposition/Instructions    Patient to have scheduled Telephone Visit with a provider. Follow System Ambulatory Workflow for COVID 19.     The clinic staff will assist you to schedule an appointment to complete the Telephone Visit with a provider during normal clinic hours.       Call Back If: Your symptoms worsen before you are able to complete your Telephone Visit with a provider.    Thank you for limiting contact with others, wearing a simple mask to cover your cough, practice good hand hygiene habits and accessing our virtual services where possible to limit the spread of this virus.    For more information about COVID19 and options for caring for yourself at home, please visit the CDC website at https://www.cdc.gov/coronavirus/2019-ncov/about/steps-when-sick.html  For more options for care at [facility], please visit our website at [email or web address]    For more information, please use the [facility] of Health ([facility]) COVID-19 Hotlines (Interpreters available):     Health questions: Phone Number: [phone] or [phone] and Hours: 7 a.m. to 7 p.m.    Schools and  questions: Phone Number: [phone] or [phone] and Hours 7 a.m. to 7 p.m.

## 2020-08-12 ENCOUNTER — OFFICE VISIT (OUTPATIENT)
Dept: FAMILY MEDICINE | Facility: CLINIC | Age: 75
End: 2020-08-12
Payer: COMMERCIAL

## 2020-08-12 VITALS
HEART RATE: 66 BPM | DIASTOLIC BLOOD PRESSURE: 74 MMHG | TEMPERATURE: 97.4 F | RESPIRATION RATE: 20 BRPM | HEIGHT: 67 IN | WEIGHT: 181 LBS | SYSTOLIC BLOOD PRESSURE: 148 MMHG | BODY MASS INDEX: 28.41 KG/M2 | OXYGEN SATURATION: 96 %

## 2020-08-12 DIAGNOSIS — E78.5 HYPERLIPIDEMIA LDL GOAL <130: ICD-10-CM

## 2020-08-12 DIAGNOSIS — Z80.0 FAMILY HISTORY OF RECTAL CANCER: ICD-10-CM

## 2020-08-12 DIAGNOSIS — Z00.00 ROUTINE HISTORY AND PHYSICAL EXAMINATION OF ADULT: Primary | ICD-10-CM

## 2020-08-12 DIAGNOSIS — R21 RASH AND OTHER NONSPECIFIC SKIN ERUPTION: ICD-10-CM

## 2020-08-12 DIAGNOSIS — J30.0 CHRONIC VASOMOTOR RHINITIS: ICD-10-CM

## 2020-08-12 DIAGNOSIS — I10 ESSENTIAL HYPERTENSION, BENIGN: ICD-10-CM

## 2020-08-12 LAB
ALT SERPL W P-5'-P-CCNC: 28 U/L (ref 0–70)
CHOLEST SERPL-MCNC: 166 MG/DL
HDLC SERPL-MCNC: 50 MG/DL
LDLC SERPL CALC-MCNC: 92 MG/DL
NONHDLC SERPL-MCNC: 116 MG/DL
TRIGL SERPL-MCNC: 120 MG/DL

## 2020-08-12 PROCEDURE — 36415 COLL VENOUS BLD VENIPUNCTURE: CPT | Performed by: FAMILY MEDICINE

## 2020-08-12 PROCEDURE — 84460 ALANINE AMINO (ALT) (SGPT): CPT | Performed by: FAMILY MEDICINE

## 2020-08-12 PROCEDURE — 99397 PER PM REEVAL EST PAT 65+ YR: CPT | Performed by: FAMILY MEDICINE

## 2020-08-12 PROCEDURE — 99213 OFFICE O/P EST LOW 20 MIN: CPT | Mod: 25 | Performed by: FAMILY MEDICINE

## 2020-08-12 PROCEDURE — 80061 LIPID PANEL: CPT | Performed by: FAMILY MEDICINE

## 2020-08-12 RX ORDER — LOVASTATIN 40 MG
40 TABLET ORAL AT BEDTIME
Qty: 90 TABLET | Refills: 3 | Status: SHIPPED | OUTPATIENT
Start: 2020-08-12 | End: 2021-04-14

## 2020-08-12 RX ORDER — TRIAMCINOLONE ACETONIDE 5 MG/G
CREAM TOPICAL
Qty: 30 G | Refills: 3 | Status: SHIPPED | OUTPATIENT
Start: 2020-08-12 | End: 2021-08-16

## 2020-08-12 RX ORDER — ATENOLOL 25 MG/1
25 TABLET ORAL DAILY
Qty: 90 TABLET | Refills: 3 | Status: SHIPPED | OUTPATIENT
Start: 2020-08-12 | End: 2021-04-14

## 2020-08-12 RX ORDER — FLUTICASONE PROPIONATE 50 MCG
1-2 SPRAY, SUSPENSION (ML) NASAL DAILY PRN
Qty: 51 G | Refills: 3 | Status: SHIPPED | OUTPATIENT
Start: 2020-08-12 | End: 2021-08-16

## 2020-08-12 ASSESSMENT — MIFFLIN-ST. JEOR: SCORE: 1509.64

## 2020-08-12 ASSESSMENT — ACTIVITIES OF DAILY LIVING (ADL): CURRENT_FUNCTION: NO ASSISTANCE NEEDED

## 2020-08-12 NOTE — PROGRESS NOTES
"SUBJECTIVE:   Velasquez Del Rio is a 74 year old male who presents for Preventive Visit.    Are you in the first 12 months of your Medicare coverage?  No    Healthy Habits:     In general, how would you rate your overall health?  Very good    Frequency of exercise:  4-5 days/week    Duration of exercise:  30-45 minutes    Do you usually eat at least 4 servings of fruit and vegetables a day, include whole grains    & fiber and avoid regularly eating high fat or \"junk\" foods?  Yes    Taking medications regularly:  Yes    Barriers to taking medications:  Not applicable    Medication side effects:  None    Ability to successfully perform activities of daily living:  No assistance needed    Home Safety:  Lack of grab bars in the bathroom    Hearing Impairment:  No hearing concerns    In the past 6 months, have you been bothered by leaking of urine? Yes (A little bit.  If he does not get to the bathroom on time.)    In general, how would you rate your overall mental or emotional health?  Very good      PHQ-2 Total Score: 0    Do you feel safe in your environment? Yes    Have you ever done Advance Care Planning? (For example, a Health Directive, POLST, or a discussion with a medical provider or your loved ones about your wishes): Yes, advance care planning is on file.      Fall risk  Fallen 2 or more times in the past year?: No  Any fall with injury in the past year?: No    Cognitive Screening   1) Repeat 3 items (Leader, Season, Table)    2) Clock draw: NORMAL  3) 3 item recall: Recalls 3 objects  Results: 3 items recalled: COGNITIVE IMPAIRMENT LESS LIKELY    Mini-CogTM Jerica Keith. Licensed by the author for use in [facility]; reprinted with permission ([email or web address]). All rights reserved.      Do you have sleep apnea, excessive snoring or daytime drowsiness?: no    Reviewed and updated as needed this visit by clinical staff  Tobacco  Allergies  Meds         Reviewed and updated as needed this visit by " Provider        Social History     Tobacco Use     Smoking status: Former Smoker     Packs/day: 1.00     Years: 7.00     Pack years: 7.00     Types: Cigarettes     Last attempt to quit: 1968     Years since quittin.7     Smokeless tobacco: Never Used   Substance Use Topics     Alcohol use: Yes     Comment: minimal     If you drink alcohol do you typically have >3 drinks per day or >7 drinks per week? No    Alcohol Use 2017   Prescreen: >3 drinks/day or >7 drinks/week? very little alcohol     Chief Complaint   Patient presents with     Physical     Wellness physical exam.     Hypertension     Recheck on blood pressure.  When checking at home, it can be a little higher at first then will come down as she checks a few mintues later.  Average is around 130/134/unsure of the diastolic reading.  Has not checked in 4-5 months.     Lipids     Recheck on lipids medication.     Refill Request     Discuss about refills of the nasal spray.  Can this be listed as 3 bottles for the 90 day refill.     Current Outpatient Medications   Medication Instructions     ASPIRIN 81 MG OR TABS 1 TABLET DAILY     atenolol (TENORMIN) 25 mg, Oral, DAILY     fluticasone (FLONASE) 50 MCG/ACT nasal spray 1-2 sprays, Both Nostrils, DAILY PRN     lovastatin (MEVACOR) 40 mg, Oral, AT BEDTIME     triamcinolone (ARISTOCORT HP) 0.5 % external cream Apply topically twice a day as needed to affected area. Call to refill.       Patient Active Problem List   Diagnosis     Benign neoplasm of colon     Hyperlipidemia LDL goal <130     Advanced directives, counseling/discussion     Health Care Home     Herpes zoster     Diverticulosis of large intestine     Family history of colon cancer     Family history of malignant neoplasm of breast     Essential hypertension with goal blood pressure less than 140/90     Dermatitis     Chronic vasomotor rhinitis         Current providers sharing in care for this patient include:   Patient Care  "Team:  Cecilio Diaz MD as PCP - General (Family Practice)  Cecilio Diaz MD as Assigned PCP    The following health maintenance items are reviewed in Epic and correct as of today:  Health Maintenance   Topic Date Due     BMP  11/18/2014     ADVANCE CARE PLANNING  11/14/2016     MEDICARE ANNUAL WELLNESS VISIT  07/27/2019     COLORECTAL CANCER SCREENING  08/10/2019     PHQ-2  01/01/2020     ALT  08/01/2020     LIPID  08/01/2020     FALL RISK ASSESSMENT  08/01/2020     PSA  08/01/2020     INFLUENZA VACCINE (1) 09/01/2020     DTAP/TDAP/TD IMMUNIZATION (3 - Td) 09/24/2022     PNEUMOCOCCAL IMMUNIZATION 65+ LOW/MEDIUM RISK  Completed     ZOSTER IMMUNIZATION  Completed     IPV IMMUNIZATION  Aged Out     MENINGITIS IMMUNIZATION  Aged Out     HEPATITIS B IMMUNIZATION  Aged Out           Review of Systems  CONSTITUTIONAL: NEGATIVE for fever, chills, change in weight  INTEGUMENTARY/SKIN: NEGATIVE for worrisome rashes, moles or lesions  EYES: NEGATIVE for vision changes or irritation  ENT/MOUTH: NEGATIVE for ear, mouth and throat problems  RESP: NEGATIVE for significant cough or SOB  BREAST: NEGATIVE for masses, tenderness or discharge  CV: NEGATIVE for chest pain, palpitations or peripheral edema  GI: NEGATIVE for nausea, abdominal pain, heartburn, or change in bowel habits  : NEGATIVE for frequency, dysuria, or hematuria  MUSCULOSKELETAL: NEGATIVE for significant arthralgias or myalgia  NEURO: NEGATIVE for weakness, dizziness or paresthesias  ENDOCRINE: NEGATIVE for temperature intolerance, skin/hair changes  HEME: NEGATIVE for bleeding problems  PSYCHIATRIC: NEGATIVE for changes in mood or affect    OBJECTIVE:   BP (!) 148/74   Pulse 66   Temp 97.4  F (36.3  C) (Tympanic)   Resp 20   Ht 1.702 m (5' 7\")   Wt 82.1 kg (181 lb)   SpO2 96%   BMI 28.35 kg/m   Estimated body mass index is 28.35 kg/m  as calculated from the following:    Height as of this encounter: 1.702 m (5' 7\").    Weight as of this " "encounter: 82.1 kg (181 lb).  Physical Exam  Exam:  GENERAL APPEARANCE: healthy, alert and no distress  EYES: EOMI,  PERRL  HENT: ear canals and TM's normal and nose and mouth without ulcers or lesions  NECK: no adenopathy, no asymmetry, masses, or scars and thyroid normal to palpation  RESP: lungs clear to auscultation - no rales, rhonchi or wheezes  CV: regular rates and rhythm, normal S1 S2, no S3 or S4 and no murmur, click or rub -  ABDOMEN:  soft, nontender, no HSM or masses and bowel sounds normal  GU_male: testicles normal without atrophy or masses, no hernias and penis normal without urethral discharge  MS: extremities normal- no gross deformities noted, no evidence of inflammation in joints, FROM in all extremities.  SKIN: no suspicious lesions or rashes  NEURO: Normal strength and tone, sensory exam grossly normal, mentation intact and speech normal  PSYCH: mentation appears normal and affect normal/bright  LYMPHATICS: No axillary, cervical, inguinal, or supraclavicular nodes      ASSESSMENT / PLAN:   Routine history and physical examination of adult  COUNSELING:  Reviewed preventive health counseling, as reflected in patient instructions       Regular exercise       Healthy diet/nutrition       Vision screening       Hearing screening  Estimated body mass index is 28.35 kg/m  as calculated from the following:    Height as of this encounter: 1.702 m (5' 7\").    Weight as of this encounter: 82.1 kg (181 lb).   reports that he quit smoking about 51 years ago. His smoking use included cigarettes. He has a 7.00 pack-year smoking history. He has never used smokeless tobacco.  Appropriate preventive services were discussed with this patient, including applicable screening as appropriate for cardiovascular disease, diabetes, osteopenia/osteoporosis, and glaucoma.  As appropriate for age/gender, discussed screening for colorectal cancer, prostate cancer, breast cancer, and cervical cancer. Checklist reviewing " preventive services available has been given to the patient.  Reviewed patients plan of care and provided an AVS. The Basic Care Plan (routine screening as documented in Health Maintenance) for Velasquez meets the Care Plan requirement. This Care Plan has been established and reviewed with the Patient.  Counseling Resources:  ATP IV Guidelines  Pooled Cohorts Equation Calculator  Breast Cancer Risk Calculator  FRAX Risk Assessment  ICSI Preventive Guidelines  Dietary Guidelines for Americans, 2010  USDA's MyPlate  ASA Prophylaxis  Lung CA Screening    1. Essential hypertension, benign  Monitor and record the BP at rest and the goal for the average is under 130/80. Use the non drug therapies.   Refills are done.   - atenolol (TENORMIN) 25 MG tablet; Take 1 tablet (25 mg) by mouth daily  Dispense: 90 tablet; Refill: 3    2. Chronic vasomotor rhinitis  Use as directed and refills are done.   - fluticasone (FLONASE) 50 MCG/ACT nasal spray; Spray 1-2 sprays into both nostrils daily as needed for rhinitis  Dispense: 51 g; Refill: 3    3. Hyperlipidemia LDL goal <130  Use the lower chol diet and exercise daily and the med is refilled. Do the fasting labs and we will call the results.   - lovastatin (MEVACOR) 40 MG tablet; Take 1 tablet (40 mg) by mouth At Bedtime  Dispense: 90 tablet; Refill: 3  - ALT  - Lipid panel reflex to direct LDL Fasting    4. Rash and other nonspecific skin eruption  Use the med as needed in a thin film twice daily for only a few days.   - triamcinolone (ARISTOCORT HP) 0.5 % external cream; Apply topically twice a day as needed to affected area. Call to refill.  Dispense: 30 g; Refill: 3    5. Family history of rectal cancer  The next colonoscopy is in 2021.        Cecilio Diaz MD  [facility]    Identified Health Risks:

## 2020-08-12 NOTE — PATIENT INSTRUCTIONS
"  ASSESSMENT / PLAN:   Routine history and physical examination of adult  COUNSELING:  Reviewed preventive health counseling, as reflected in patient instructions       Regular exercise       Healthy diet/nutrition       Vision screening       Hearing screening  Estimated body mass index is 28.35 kg/m  as calculated from the following:    Height as of this encounter: 1.702 m (5' 7\").    Weight as of this encounter: 82.1 kg (181 lb).   reports that he quit smoking about 51 years ago. His smoking use included cigarettes. He has a 7.00 pack-year smoking history. He has never used smokeless tobacco.  Appropriate preventive services were discussed with this patient, including applicable screening as appropriate for cardiovascular disease, diabetes, osteopenia/osteoporosis, and glaucoma.  As appropriate for age/gender, discussed screening for colorectal cancer, prostate cancer, breast cancer, and cervical cancer. Checklist reviewing preventive services available has been given to the patient.  Reviewed patients plan of care and provided an AVS. The Basic Care Plan (routine screening as documented in Health Maintenance) for Velasquez meets the Care Plan requirement. This Care Plan has been established and reviewed with the Patient.  Counseling Resources:  ATP IV Guidelines  Pooled Cohorts Equation Calculator  Breast Cancer Risk Calculator  FRAX Risk Assessment  ICSI Preventive Guidelines  Dietary Guidelines for Americans, 2010  USDA's MyPlate  ASA Prophylaxis  Lung CA Screening    1. Essential hypertension, benign  Monitor and record the BP at rest and the goal for the average is under 130/80. Use the non drug therapies.   Refills are done.   - atenolol (TENORMIN) 25 MG tablet; Take 1 tablet (25 mg) by mouth daily  Dispense: 90 tablet; Refill: 3    2. Chronic vasomotor rhinitis  Use as directed and refills are done.   - fluticasone (FLONASE) 50 MCG/ACT nasal spray; Spray 1-2 sprays into both nostrils daily as needed for " rhinitis  Dispense: 51 g; Refill: 3    3. Hyperlipidemia LDL goal <130  Use the lower chol diet and exercise daily and the med is refilled. Do the fasting labs and we will call the results.   - lovastatin (MEVACOR) 40 MG tablet; Take 1 tablet (40 mg) by mouth At Bedtime  Dispense: 90 tablet; Refill: 3  - ALT  - Lipid panel reflex to direct LDL Fasting    4. Rash and other nonspecific skin eruption  Use the med as needed in a thin film twice daily for only a few days.   - triamcinolone (ARISTOCORT HP) 0.5 % external cream; Apply topically twice a day as needed to affected area. Call to refill.  Dispense: 30 g; Refill: 3    5. Family history of rectal cancer  The next colonoscopy is in 2021.

## 2020-08-12 NOTE — LETTER
August 12, 2020      Velasquez Andresar  [address] [facility] [address]        Dear ,    We are writing to inform you of your test results.    Your test results fall within the expected range(s) /normal.    Resulted Orders   ALT   Result Value Ref Range    ALT 28 0 - 70 U/L   Lipid panel reflex to direct LDL Fasting   Result Value Ref Range    Cholesterol 166 <200 mg/dL    Triglycerides 120 <150 mg/dL    HDL Cholesterol 50 >39 mg/dL    LDL Cholesterol Calculated 92 <100 mg/dL      Comment:      Desirable:       <100 mg/dl    Non HDL Cholesterol 116 <130 mg/dL       If you have any questions or concerns, please call the clinic at the number listed above.       Sincerely,        Cecilio Diaz MD

## 2020-09-29 ENCOUNTER — IMMUNIZATION (OUTPATIENT)
Dept: FAMILY MEDICINE | Facility: CLINIC | Age: 75
End: 2020-09-29
Payer: COMMERCIAL

## 2020-09-29 DIAGNOSIS — Z23 NEED FOR PROPHYLACTIC VACCINATION AND INOCULATION AGAINST INFLUENZA: Primary | ICD-10-CM

## 2020-09-29 PROCEDURE — G0008 ADMIN INFLUENZA VIRUS VAC: HCPCS

## 2020-09-29 PROCEDURE — 99207 ZZC NO CHARGE NURSE ONLY: CPT

## 2020-09-29 PROCEDURE — 90662 IIV NO PRSV INCREASED AG IM: CPT

## 2020-10-14 ENCOUNTER — OFFICE VISIT (OUTPATIENT)
Dept: DERMATOLOGY | Facility: CLINIC | Age: 75
End: 2020-10-14
Payer: COMMERCIAL

## 2020-10-14 VITALS
HEART RATE: 97 BPM | OXYGEN SATURATION: 98 % | SYSTOLIC BLOOD PRESSURE: 145 MMHG | RESPIRATION RATE: 16 BRPM | DIASTOLIC BLOOD PRESSURE: 82 MMHG

## 2020-10-14 DIAGNOSIS — L70.0 ACNE VULGARIS: Primary | ICD-10-CM

## 2020-10-14 PROCEDURE — 99213 OFFICE O/P EST LOW 20 MIN: CPT | Performed by: PHYSICIAN ASSISTANT

## 2020-10-14 RX ORDER — DOXYCYCLINE 100 MG/1
100 CAPSULE ORAL 2 TIMES DAILY WITH MEALS
Qty: 60 CAPSULE | Refills: 0 | Status: SHIPPED | OUTPATIENT
Start: 2020-10-14 | End: 2021-06-11

## 2020-10-14 NOTE — PATIENT INSTRUCTIONS
Use benzoyl peroxide, panoxyl 5% wash to wash face daily.   Take doxycycline 100 twice daily with food.     Please call in one month if not [phone]

## 2020-10-14 NOTE — NURSING NOTE
"Initial BP (!) 145/82 (BP Location: Left arm, Patient Position: Sitting, Cuff Size: Adult Regular)   Pulse 97   Resp 16   SpO2 98%  Estimated body mass index is 28.35 kg/m  as calculated from the following:    Height as of 8/12/20: 1.702 m (5' 7\").    Weight as of 8/12/20: 82.1 kg (181 lb). .    Teresa Diaz, [facility]    "

## 2020-10-14 NOTE — LETTER
10/14/2020         RE: Velasquez Del Rio  [address]  [facility] [address]        Dear Colleague,    Thank you for referring your patient, Velasquez Del Rio, to the [facility]. Please see a copy of my visit note below.    Velasquez Del Rio is a 75 year old year old male patient here today for inflamed acne on lower half of face. He notes it has been present for a few weeks, cystic. Patient has no other skin complaints today.  Remainder of the HPI, Meds, PMH, Allergies, FH, and SH was reviewed in chart.    No past medical history on file.    Past Surgical History:   Procedure Laterality Date     COLONOSCOPY      negative      COLONOSCOPY  2011    Procedure:COLONOSCOPY; Colonoscopy; Surgeon:DARCIE FISHER; Location:[facility]     COLONOSCOPY N/A 8/10/2016    Procedure: COLONOSCOPY;  Surgeon: Morales Renteria MD;  Location: [facility]     LAPAROSCOPIC HERNIORRHAPHY INGUINAL BILATERAL Bilateral 2019    Procedure: Laparoscopic bilateral inguinal hernia repair and open umbilical hernia repair;  Surgeon: Mckinley Colindres MD;  Location: [facility]        Family History   Problem Relation Age of Onset     Hypertension Mother      Cerebrovascular Disease Mother          of a stroke     Cancer - colorectal Mother         age 70s     Prostate Cancer Father         had prostate removed     Diabetes Maternal Grandmother      Cancer Brother         bladder cancer     Colon Cancer Daughter 53        colon cancer surger     Breast Cancer Daughter      Asthma No family hx of      C.A.D. No family hx of      Melanoma No family hx of        Social History     Socioeconomic History     Marital status:      Spouse name: Not on file     Number of children: Not on file     Years of education: Not on file     Highest education level: Not on file   Occupational History     Occupation: Construction/carpentry     Employer: SELF   Social Needs     Financial resource strain: Not on file     Food  insecurity     Worry: Not on file     Inability: Not on file     Transportation needs     Medical: Not on file     Non-medical: Not on file   Tobacco Use     Smoking status: Former Smoker     Packs/day: 1.00     Years: 7.00     Pack years: 7.00     Types: Cigarettes     Quit date: 1968     Years since quittin.9     Smokeless tobacco: Never Used   Substance and Sexual Activity     Alcohol use: Yes     Comment: minimal     Drug use: No     Sexual activity: Not on file   Lifestyle     Physical activity     Days per week: Not on file     Minutes per session: Not on file     Stress: Not on file   Relationships     Social connections     Talks on phone: Not on file     Gets together: Not on file     Attends Buddhism service: Not on file     Active member of club or organization: Not on file     Attends meetings of clubs or organizations: Not on file     Relationship status: Not on file     Intimate partner violence     Fear of current or ex partner: Not on file     Emotionally abused: Not on file     Physically abused: Not on file     Forced sexual activity: Not on file   Other Topics Concern      Service Not Asked     Blood Transfusions Not Asked     Caffeine Concern Yes     Comment: occasional     Occupational Exposure Not Asked     Hobby Hazards Not Asked     Sleep Concern Not Asked     Stress Concern Not Asked     Weight Concern Not Asked     Special Diet Not Asked     Back Care Not Asked     Exercise Yes     Comment: stretch exercises     Bike Helmet Not Asked     Seat Belt Yes     Self-Exams No     Parent/sibling w/ CABG, MI or angioplasty before 65F 55M? No   Social History Narrative     Not on file       Outpatient Encounter Medications as of 10/14/2020   Medication Sig Dispense Refill     ASPIRIN 81 MG OR TABS 1 TABLET DAILY       atenolol (TENORMIN) 25 MG tablet Take 1 tablet (25 mg) by mouth daily 90 tablet 3     doxycycline monohydrate (MONODOX) 100 MG capsule Take 1 capsule (100 mg) by  mouth 2 times daily (with meals) 60 capsule 0     fluticasone (FLONASE) 50 MCG/ACT nasal spray Spray 1-2 sprays into both nostrils daily as needed for rhinitis 51 g 3     lovastatin (MEVACOR) 40 MG tablet Take 1 tablet (40 mg) by mouth At Bedtime 90 tablet 3     triamcinolone (ARISTOCORT HP) 0.5 % external cream Apply topically twice a day as needed to affected area. Call to refill. 30 g 3     No facility-administered encounter medications on file as of 10/14/2020.              Review Of Systems  Skin: As above  Eyes: negative  Ears/Nose/Throat: negative  Respiratory: No shortness of breath, dyspnea on exertion, cough, or hemoptysis  Cardiovascular: negative  Gastrointestinal: negative  Genitourinary: negative  Musculoskeletal: negative  Neurologic: negative  Psychiatric: negative  Hematologic/Lymphatic/Immunologic: negative  Endocrine: negative      O:   NAD, WDWN, Alert & Oriented, Mood & Affect wnl, Vitals stable   Here today alone   BP (!) 145/82 (BP Location: Left arm, Patient Position: Sitting, Cuff Size: Adult Regular)   Pulse 97   Resp 16   SpO2 98%    General appearance normal   Vitals stable   Alert, oriented and in no acute distress  inflammatory papules on lower half of face     Eyes: Conjunctivae/lids:Normal     ENT: Lips: normal    MSK:Normal    Pulm: Breathing Normal    Neuro/Psych: Orientation:Alert and Orientedx3 ; Mood/Affect:normal   A/P:  1. Acne Vulgaris   Use benzoyl peroxide, panoxyl 5% wash to wash face daily.   Take doxycycline 100 twice daily with food.     Please call in one month if not [phone]   Jung to follow up with Primary Care provider regarding elevated blood pressure.        Again, thank you for allowing me to participate in the care of your patient.        Sincerely,        Jane Colón PA-C

## 2020-10-19 NOTE — PROGRESS NOTES
Velasquez Del Rio is a 75 year old year old male patient here today for inflamed acne on lower half of face. He notes it has been present for a few weeks, cystic. Patient has no other skin complaints today.  Remainder of the HPI, Meds, PMH, Allergies, FH, and SH was reviewed in chart.    No past medical history on file.    Past Surgical History:   Procedure Laterality Date     COLONOSCOPY      negative      COLONOSCOPY  2011    Procedure:COLONOSCOPY; Colonoscopy; Surgeon:DARCIE FISHER; Location:[facility]     COLONOSCOPY N/A 8/10/2016    Procedure: COLONOSCOPY;  Surgeon: Morales Renteria MD;  Location: [facility]     LAPAROSCOPIC HERNIORRHAPHY INGUINAL BILATERAL Bilateral 2019    Procedure: Laparoscopic bilateral inguinal hernia repair and open umbilical hernia repair;  Surgeon: Mckinley Colindres MD;  Location: [facility]        Family History   Problem Relation Age of Onset     Hypertension Mother      Cerebrovascular Disease Mother          of a stroke     Cancer - colorectal Mother         age 70s     Prostate Cancer Father         had prostate removed     Diabetes Maternal Grandmother      Cancer Brother         bladder cancer     Colon Cancer Daughter 53        colon cancer surger     Breast Cancer Daughter      Asthma No family hx of      C.A.D. No family hx of      Melanoma No family hx of        Social History     Socioeconomic History     Marital status:      Spouse name: Not on file     Number of children: Not on file     Years of education: Not on file     Highest education level: Not on file   Occupational History     Occupation: Construction/carpentry     Employer: SELF   Social Needs     Financial resource strain: Not on file     Food insecurity     Worry: Not on file     Inability: Not on file     Transportation needs     Medical: Not on file     Non-medical: Not on file   Tobacco Use     Smoking status: Former Smoker     Packs/day: 1.00     Years: 7.00     Pack years: 7.00     Types:  Cigarettes     Quit date: 1968     Years since quittin.9     Smokeless tobacco: Never Used   Substance and Sexual Activity     Alcohol use: Yes     Comment: minimal     Drug use: No     Sexual activity: Not on file   Lifestyle     Physical activity     Days per week: Not on file     Minutes per session: Not on file     Stress: Not on file   Relationships     Social connections     Talks on phone: Not on file     Gets together: Not on file     Attends Yazidism service: Not on file     Active member of club or organization: Not on file     Attends meetings of clubs or organizations: Not on file     Relationship status: Not on file     Intimate partner violence     Fear of current or ex partner: Not on file     Emotionally abused: Not on file     Physically abused: Not on file     Forced sexual activity: Not on file   Other Topics Concern      Service Not Asked     Blood Transfusions Not Asked     Caffeine Concern Yes     Comment: occasional     Occupational Exposure Not Asked     Hobby Hazards Not Asked     Sleep Concern Not Asked     Stress Concern Not Asked     Weight Concern Not Asked     Special Diet Not Asked     Back Care Not Asked     Exercise Yes     Comment: stretch exercises     Bike Helmet Not Asked     Seat Belt Yes     Self-Exams No     Parent/sibling w/ CABG, MI or angioplasty before 65F 55M? No   Social History Narrative     Not on file       Outpatient Encounter Medications as of 10/14/2020   Medication Sig Dispense Refill     ASPIRIN 81 MG OR TABS 1 TABLET DAILY       atenolol (TENORMIN) 25 MG tablet Take 1 tablet (25 mg) by mouth daily 90 tablet 3     doxycycline monohydrate (MONODOX) 100 MG capsule Take 1 capsule (100 mg) by mouth 2 times daily (with meals) 60 capsule 0     fluticasone (FLONASE) 50 MCG/ACT nasal spray Spray 1-2 sprays into both nostrils daily as needed for rhinitis 51 g 3     lovastatin (MEVACOR) 40 MG tablet Take 1 tablet (40 mg) by mouth At Bedtime 90 tablet 3      triamcinolone (ARISTOCORT HP) 0.5 % external cream Apply topically twice a day as needed to affected area. Call to refill. 30 g 3     No facility-administered encounter medications on file as of 10/14/2020.              Review Of Systems  Skin: As above  Eyes: negative  Ears/Nose/Throat: negative  Respiratory: No shortness of breath, dyspnea on exertion, cough, or hemoptysis  Cardiovascular: negative  Gastrointestinal: negative  Genitourinary: negative  Musculoskeletal: negative  Neurologic: negative  Psychiatric: negative  Hematologic/Lymphatic/Immunologic: negative  Endocrine: negative      O:   NAD, WDWN, Alert & Oriented, Mood & Affect wnl, Vitals stable   Here today alone   BP (!) 145/82 (BP Location: Left arm, Patient Position: Sitting, Cuff Size: Adult Regular)   Pulse 97   Resp 16   SpO2 98%    General appearance normal   Vitals stable   Alert, oriented and in no acute distress  inflammatory papules on lower half of face     Eyes: Conjunctivae/lids:Normal     ENT: Lips: normal    MSK:Normal    Pulm: Breathing Normal    Neuro/Psych: Orientation:Alert and Orientedx3 ; Mood/Affect:normal   A/P:  1. Acne Vulgaris   Use benzoyl peroxide, panoxyl 5% wash to wash face daily.   Take doxycycline 100 twice daily with food.     Please call in one month if not [phone]   Jung to follow up with Primary Care provider regarding elevated blood pressure.

## 2021-04-09 DIAGNOSIS — I10 ESSENTIAL HYPERTENSION, BENIGN: ICD-10-CM

## 2021-04-09 DIAGNOSIS — E78.5 HYPERLIPIDEMIA LDL GOAL <130: ICD-10-CM

## 2021-04-09 NOTE — TELEPHONE ENCOUNTER
Patient needs his meds refilled and it has to be 90day please.  meds and pharmacy selected.  Patient insurance has change.    Gabriela Henry, [place]

## 2021-04-14 RX ORDER — ATENOLOL 25 MG/1
25 TABLET ORAL DAILY
Qty: 90 TABLET | Refills: 0 | Status: SHIPPED | OUTPATIENT
Start: 2021-04-14 | End: 2021-06-16

## 2021-04-14 RX ORDER — LOVASTATIN 40 MG
40 TABLET ORAL AT BEDTIME
Qty: 90 TABLET | Refills: 1 | Status: SHIPPED | OUTPATIENT
Start: 2021-04-14 | End: 2021-08-16

## 2021-04-14 NOTE — TELEPHONE ENCOUNTER
Lovastatin Prescription approved per [facility] Refill Protocol.    Atenolol  Routing refill request to provider for review/approval because:  BP is above protocol limit.    Mi Mahoney RN

## 2021-04-14 NOTE — TELEPHONE ENCOUNTER
Covering for PCP (out of clinic today):  Refill send, please advise patient to return for RN BP check.    Thanks,  Merritt Hopkins MD

## 2021-04-14 NOTE — TELEPHONE ENCOUNTER
Patient was notified and scheduled.    Thank you    The patient has derm appt on 4/20/21 and would like to have RN BP check at that time.    Lise BAUGH RN

## 2021-04-14 NOTE — TELEPHONE ENCOUNTER
Juany Barnard contacted Velasquez on 04/14/21 and left a message. If patient calls back please schedule a RN BP appointment In 3 months.

## 2021-04-20 ENCOUNTER — OFFICE VISIT (OUTPATIENT)
Dept: DERMATOLOGY | Facility: CLINIC | Age: 76
End: 2021-04-20
Payer: COMMERCIAL

## 2021-04-20 ENCOUNTER — TELEPHONE (OUTPATIENT)
Dept: FAMILY MEDICINE | Facility: CLINIC | Age: 76
End: 2021-04-20

## 2021-04-20 ENCOUNTER — OFFICE VISIT (OUTPATIENT)
Dept: FAMILY MEDICINE | Facility: CLINIC | Age: 76
End: 2021-04-20
Payer: COMMERCIAL

## 2021-04-20 ENCOUNTER — TELEPHONE (OUTPATIENT)
Dept: DERMATOLOGY | Facility: CLINIC | Age: 76
End: 2021-04-20

## 2021-04-20 VITALS — HEART RATE: 64 BPM | RESPIRATION RATE: 16 BRPM | DIASTOLIC BLOOD PRESSURE: 74 MMHG | SYSTOLIC BLOOD PRESSURE: 147 MMHG

## 2021-04-20 VITALS — SYSTOLIC BLOOD PRESSURE: 144 MMHG | DIASTOLIC BLOOD PRESSURE: 78 MMHG | HEART RATE: 64 BPM

## 2021-04-20 DIAGNOSIS — I10 ESSENTIAL HYPERTENSION WITH GOAL BLOOD PRESSURE LESS THAN 140/90: Primary | ICD-10-CM

## 2021-04-20 DIAGNOSIS — L70.0 ACNE VULGARIS: Primary | ICD-10-CM

## 2021-04-20 DIAGNOSIS — L72.3 INFLAMED EPIDERMOID CYST OF SKIN: ICD-10-CM

## 2021-04-20 PROCEDURE — 99213 OFFICE O/P EST LOW 20 MIN: CPT | Performed by: PHYSICIAN ASSISTANT

## 2021-04-20 PROCEDURE — 99207 PR NO CHARGE NURSE ONLY: CPT

## 2021-04-20 RX ORDER — CLINDAMYCIN PHOSPHATE 10 MG/G
GEL TOPICAL
Qty: 30 G | Refills: 3 | Status: SHIPPED | OUTPATIENT
Start: 2021-04-20 | End: 2021-06-11 | Stop reason: ALTCHOICE

## 2021-04-20 RX ORDER — CLINDAMYCIN PHOSPHATE 10 UG/ML
LOTION TOPICAL
Qty: 60 ML | Refills: 1 | Status: SHIPPED | OUTPATIENT
Start: 2021-04-20 | End: 2024-07-25

## 2021-04-20 NOTE — NURSING NOTE
"Initial BP (!) 147/74 (BP Location: Left arm, Patient Position: Sitting, Cuff Size: Adult Large)   Pulse 64   Resp 16  Estimated body mass index is 28.35 kg/m  as calculated from the following:    Height as of 8/12/20: 1.702 m (5' 7\").    Weight as of 8/12/20: 82.1 kg (181 lb). .    Teresa Diaz, [facility]    "

## 2021-04-20 NOTE — PROGRESS NOTES
Velasquez Del Rio is a 75 year old year old patient who comes in today for a Blood Pressure check because of elevated at recent visits: Physical 8/12/20: 148/74. Dermatology 10/14/20: 145/82.  Vital Signs as repeated by RN today: 144/78, Pulse 64  Patient is taking medication as prescribed-atenolol 25 mg daily.   Patient is tolerating medications well.  Patient is not generally monitoring Blood Pressure at home; however, he did check with his home monitor today and brought it for comparison/accuracy check. His results this morning averaged 118-126/74-80.  His monitor at the clinic today was 150/93; 144/83.  BPs seem to be good at home, but a bit higher at clinic visits.  His home monitor meets accuracy standards.   Current complaints: none  Habits at home include regular exercise, minimal alcohol, adds minimal salt to food, no caffeine consumption.   Disposition:  patient to continue with the same medication and forwarding to PCP for review and recommendation.     Юлия Arndt RN  [facility]

## 2021-04-20 NOTE — TELEPHONE ENCOUNTER
[address] is 23 dollars for gel.     I sent in clindamycin lotion so they can compare cost.

## 2021-04-20 NOTE — TELEPHONE ENCOUNTER
Covering for PCP (out of clinic today):  Since BP at home this morning was good, he can continue current medication, but I'd recommend he check his blood pressure daily for the next couple of weeks and contact us with readings in about 2 weeks so we can get more data.    Thanks,  Merritt Hopkins MD

## 2021-04-20 NOTE — TELEPHONE ENCOUNTER
Velasquez Del Rio is a 75 year old patient who comes in today for a Blood Pressure check because of elevated at recent visits: Physical 8/12/20: 148/74. Dermatology 10/14/20: 145/82.  Vital Signs as repeated by RN today: 144/78, Pulse 64  Patient is taking medication as prescribed-atenolol 25 mg daily. He just had a 90 day refill on 4/14/21.  Patient is tolerating medications well.  Patient is not generally monitoring Blood Pressure at home; however, he did check with his home monitor today and brought it for comparison/accuracy check. His results this morning averaged 118-126/74-80.  His monitor at the clinic today was 150/93; 144/83.  BPs seem to be good at home, but a bit higher at clinic visits.  His home monitor meets accuracy standards.   Current complaints: none  Habits at home include regular exercise, minimal alcohol, adds minimal salt to food, no caffeine consumption.   Disposition:  patient to continue with the same medication and forwarding to PCP for review and recommendation.     Юлия Arndt RN  [facility]

## 2021-04-20 NOTE — LETTER
2021         RE: Velaqsuez Del Rio  [id number]  [facility] [place] [facility] [address]        Dear Colleague,    Thank you for referring your patient, Velasquez Del Rio, to the [facility]. Please see a copy of my visit note below.    Velasquez Del Rio is an extremely pleasant 75 year old year old male patient here today for recheck acne. He notes warm compresses have been helping. He only has one cystic area left. Patient has no other skin complaints today.  Remainder of the HPI, Meds, PMH, Allergies, FH, and SH was reviewed in chart.    No past medical history on file.    Past Surgical History:   Procedure Laterality Date     COLONOSCOPY      negative      COLONOSCOPY  2011    Procedure:COLONOSCOPY; Colonoscopy; Surgeon:DARCIE FISHER; Location:[facility]     COLONOSCOPY N/A 8/10/2016    Procedure: COLONOSCOPY;  Surgeon: Morales Renteria MD;  Location: [facility]     LAPAROSCOPIC HERNIORRHAPHY INGUINAL BILATERAL Bilateral 2019    Procedure: Laparoscopic bilateral inguinal hernia repair and open umbilical hernia repair;  Surgeon: Mckinley Colindres MD;  Location: [facility]        Family History   Problem Relation Age of Onset     Hypertension Mother      Cerebrovascular Disease Mother          of a stroke     Cancer - colorectal Mother         age 70s     Prostate Cancer Father         had prostate removed     Diabetes Maternal Grandmother      Cancer Brother         bladder cancer     Colon Cancer Daughter 53        colon cancer surger     Breast Cancer Daughter      Asthma No family hx of      C.A.D. No family hx of      Melanoma No family hx of        Social History     Socioeconomic History     Marital status:      Spouse name: Not on file     Number of children: Not on file     Years of education: Not on file     Highest education level: Not on file   Occupational History     Occupation: Construction/carpentry     Employer: SELF   Social Needs     Financial resource strain: Not on  file     Food insecurity     Worry: Not on file     Inability: Not on file     Transportation needs     Medical: Not on file     Non-medical: Not on file   Tobacco Use     Smoking status: Former Smoker     Packs/day: 1.00     Years: 7.00     Pack years: 7.00     Types: Cigarettes     Quit date: 1968     Years since quittin.4     Smokeless tobacco: Never Used   Substance and Sexual Activity     Alcohol use: Yes     Comment: minimal     Drug use: No     Sexual activity: Not on file   Lifestyle     Physical activity     Days per week: Not on file     Minutes per session: Not on file     Stress: Not on file   Relationships     Social connections     Talks on phone: Not on file     Gets together: Not on file     Attends Orthodox service: Not on file     Active member of club or organization: Not on file     Attends meetings of clubs or organizations: Not on file     Relationship status: Not on file     Intimate partner violence     Fear of current or ex partner: Not on file     Emotionally abused: Not on file     Physically abused: Not on file     Forced sexual activity: Not on file   Other Topics Concern      Service Not Asked     Blood Transfusions Not Asked     Caffeine Concern Yes     Comment: occasional     Occupational Exposure Not Asked     Hobby Hazards Not Asked     Sleep Concern Not Asked     Stress Concern Not Asked     Weight Concern Not Asked     Special Diet Not Asked     Back Care Not Asked     Exercise Yes     Comment: stretch exercises     Bike Helmet Not Asked     Seat Belt Yes     Self-Exams No     Parent/sibling w/ CABG, MI or angioplasty before 65F 55M? No   Social History Narrative     Not on file       Outpatient Encounter Medications as of 2021   Medication Sig Dispense Refill     ASPIRIN 81 MG OR TABS 1 TABLET DAILY       atenolol (TENORMIN) 25 MG tablet Take 1 tablet (25 mg) by mouth daily 90 tablet 0     clindamycin (CLINDAMAX) 1 % external gel Apply twice daily as  needed. 30 g 3     fluticasone (FLONASE) 50 MCG/ACT nasal spray Spray 1-2 sprays into both nostrils daily as needed for rhinitis 51 g 3     lovastatin (MEVACOR) 40 MG tablet Take 1 tablet (40 mg) by mouth At Bedtime 90 tablet 1     triamcinolone (ARISTOCORT HP) 0.5 % external cream Apply topically twice a day as needed to affected area. Call to refill. 30 g 3     doxycycline monohydrate (MONODOX) 100 MG capsule Take 1 capsule (100 mg) by mouth 2 times daily (with meals) (Patient not taking: Reported on 4/20/2021) 60 capsule 0     No facility-administered encounter medications on file as of 4/20/2021.              Review Of Systems  Skin: As above  Eyes: negative  Ears/Nose/Throat: negative  Respiratory: No shortness of breath, dyspnea on exertion, cough      O:   NAD, WDWN, Alert & Oriented, Mood & Affect wnl, Vitals stable   Here today alone   BP (!) 147/74 (BP Location: Left arm, Patient Position: Sitting, Cuff Size: Adult Large)   Pulse 64   Resp 16    General appearance normal   Vitals stable   Alert, oriented and in no acute distress  Inflamed cyst on right side of mouth       Eyes: Conjunctivae/lids:Normal     ENT: Lips: normal    MSK:Normal    Pulm: Breathing Normal    Neuro/Psych: Orientation:Alert and Orientedx3 ; Mood/Affect:normal   A/P:  1. Inflamed cyst on right side of mouth   He would like to continue warm packs. Discussed intralesional steroid injection. Consider excision with Dr. Berry if this does not resolve.   Apply clindamycin as needed.       Again, thank you for allowing me to participate in the care of your patient.        Sincerely,        Jane Colón PA-C

## 2021-04-20 NOTE — TELEPHONE ENCOUNTER
Reason for Call:  Other     Detailed comments: Pt states his pharmacy ([facility]) informed him that clindamycin is going to cost pt $300 out of pocket and told pt he should check to see if insurance will cover this (pharmacy does not have this in stock and has to order it) - Is there something less expensive that pt could try? - Please advise     Phone Number Patient can be reached at: Home number on file [phone] (home)    Best Time: Pt would like to be called back today    Can we leave a detailed message on this number? YES    Call taken on 4/20/2021 at 3:28 PM by Denise Behrendt

## 2021-04-21 ENCOUNTER — TELEPHONE (OUTPATIENT)
Dept: DERMATOLOGY | Facility: CLINIC | Age: 76
End: 2021-04-21

## 2021-04-21 NOTE — TELEPHONE ENCOUNTER
Spoke to patient and since I cannot access codesy from three different computers, will give him a printed [email or web address] card that he will bring to his pharmacy. He will come to [facility] clinic to pick this up at the . Elba Neely RN

## 2021-04-21 NOTE — TELEPHONE ENCOUNTER
Reason for Call:  Other prescription    Detailed comments: Pt states was given rx for clindamycin 1 % ext gel - sent to [facility] in [place] - this was $300 and they did not have it in stock.  Pt called and spoke to someone at the clinic and they informed him they would try the 1% lotion to see if it was less expensive.  States he was also informed about a goodrx coupon - wants to know if this is still an option - is this something the clinic can print for him and he can ?  He doesn't do much with computers.    Phone Number Patient can be reached at: Home number on file [phone] (home)    Best Time:     Can we leave a detailed message on this number? YES    Call taken on 4/21/2021 at 11:15 AM by Jennifer thompson

## 2021-04-26 NOTE — PROGRESS NOTES
Velasquez Del Rio is an extremely pleasant 75 year old year old male patient here today for recheck acne. He notes warm compresses have been helping. He only has one cystic area left. Patient has no other skin complaints today.  Remainder of the HPI, Meds, PMH, Allergies, FH, and SH was reviewed in chart.    No past medical history on file.    Past Surgical History:   Procedure Laterality Date     COLONOSCOPY      negative      COLONOSCOPY  2011    Procedure:COLONOSCOPY; Colonoscopy; Surgeon:DARCIE FISHER; Location:[facility]     COLONOSCOPY N/A 8/10/2016    Procedure: COLONOSCOPY;  Surgeon: Morales Renteria MD;  Location: [facility]     LAPAROSCOPIC HERNIORRHAPHY INGUINAL BILATERAL Bilateral 2019    Procedure: Laparoscopic bilateral inguinal hernia repair and open umbilical hernia repair;  Surgeon: Mckinley Colindres MD;  Location: [facility]        Family History   Problem Relation Age of Onset     Hypertension Mother      Cerebrovascular Disease Mother          of a stroke     Cancer - colorectal Mother         age 70s     Prostate Cancer Father         had prostate removed     Diabetes Maternal Grandmother      Cancer Brother         bladder cancer     Colon Cancer Daughter 53        colon cancer surger     Breast Cancer Daughter      Asthma No family hx of      C.A.D. No family hx of      Melanoma No family hx of        Social History     Socioeconomic History     Marital status:      Spouse name: Not on file     Number of children: Not on file     Years of education: Not on file     Highest education level: Not on file   Occupational History     Occupation: Construction/carpentry     Employer: SELF   Social Needs     Financial resource strain: Not on file     Food insecurity     Worry: Not on file     Inability: Not on file     Transportation needs     Medical: Not on file     Non-medical: Not on file   Tobacco Use     Smoking status: Former Smoker     Packs/day: 1.00     Years: 7.00     Pack  years: 7.00     Types: Cigarettes     Quit date: 1968     Years since quittin.4     Smokeless tobacco: Never Used   Substance and Sexual Activity     Alcohol use: Yes     Comment: minimal     Drug use: No     Sexual activity: Not on file   Lifestyle     Physical activity     Days per week: Not on file     Minutes per session: Not on file     Stress: Not on file   Relationships     Social connections     Talks on phone: Not on file     Gets together: Not on file     Attends Yazidism service: Not on file     Active member of club or organization: Not on file     Attends meetings of clubs or organizations: Not on file     Relationship status: Not on file     Intimate partner violence     Fear of current or ex partner: Not on file     Emotionally abused: Not on file     Physically abused: Not on file     Forced sexual activity: Not on file   Other Topics Concern      Service Not Asked     Blood Transfusions Not Asked     Caffeine Concern Yes     Comment: occasional     Occupational Exposure Not Asked     Hobby Hazards Not Asked     Sleep Concern Not Asked     Stress Concern Not Asked     Weight Concern Not Asked     Special Diet Not Asked     Back Care Not Asked     Exercise Yes     Comment: stretch exercises     Bike Helmet Not Asked     Seat Belt Yes     Self-Exams No     Parent/sibling w/ CABG, MI or angioplasty before 65F 55M? No   Social History Narrative     Not on file       Outpatient Encounter Medications as of 2021   Medication Sig Dispense Refill     ASPIRIN 81 MG OR TABS 1 TABLET DAILY       atenolol (TENORMIN) 25 MG tablet Take 1 tablet (25 mg) by mouth daily 90 tablet 0     clindamycin (CLINDAMAX) 1 % external gel Apply twice daily as needed. 30 g 3     fluticasone (FLONASE) 50 MCG/ACT nasal spray Spray 1-2 sprays into both nostrils daily as needed for rhinitis 51 g 3     lovastatin (MEVACOR) 40 MG tablet Take 1 tablet (40 mg) by mouth At Bedtime 90 tablet 1     triamcinolone  (ARISTOCORT HP) 0.5 % external cream Apply topically twice a day as needed to affected area. Call to refill. 30 g 3     doxycycline monohydrate (MONODOX) 100 MG capsule Take 1 capsule (100 mg) by mouth 2 times daily (with meals) (Patient not taking: Reported on 4/20/2021) 60 capsule 0     No facility-administered encounter medications on file as of 4/20/2021.              Review Of Systems  Skin: As above  Eyes: negative  Ears/Nose/Throat: negative  Respiratory: No shortness of breath, dyspnea on exertion, cough      O:   NAD, WDWN, Alert & Oriented, Mood & Affect wnl, Vitals stable   Here today alone   BP (!) 147/74 (BP Location: Left arm, Patient Position: Sitting, Cuff Size: Adult Large)   Pulse 64   Resp 16    General appearance normal   Vitals stable   Alert, oriented and in no acute distress  Inflamed cyst on right side of mouth       Eyes: Conjunctivae/lids:Normal     ENT: Lips: normal    MSK:Normal    Pulm: Breathing Normal    Neuro/Psych: Orientation:Alert and Orientedx3 ; Mood/Affect:normal   A/P:  1. Inflamed cyst on right side of mouth   He would like to continue warm packs. Discussed intralesional steroid injection. Consider excision with Dr. Berry if this does not resolve.   Apply clindamycin as needed.

## 2021-06-11 ENCOUNTER — OFFICE VISIT (OUTPATIENT)
Dept: FAMILY MEDICINE | Facility: CLINIC | Age: 76
End: 2021-06-11
Payer: COMMERCIAL

## 2021-06-11 VITALS — DIASTOLIC BLOOD PRESSURE: 72 MMHG | SYSTOLIC BLOOD PRESSURE: 121 MMHG | HEART RATE: 76 BPM

## 2021-06-11 DIAGNOSIS — I10 ESSENTIAL HYPERTENSION WITH GOAL BLOOD PRESSURE LESS THAN 140/90: Primary | ICD-10-CM

## 2021-06-11 PROCEDURE — 99207 PR NO CHARGE NURSE ONLY: CPT

## 2021-06-11 NOTE — PROGRESS NOTES
Velasquez Del Rio is a 75 year old year old patient who comes in today for a Blood Pressure check because of ongoing blood pressure monitoring and to compare with home monitor.  Pt gets lower readings at home and we are comparing home monitor with clinic monitor.    Vital Signs as repeated by RN:  128/72, 68 pulse  121/72, 76 pulse.Rechecked 5 min later.    Home monitor reads 128/77, pulse 75.  Home monitor is reading accurately.    Patient is taking medication as prescribed  Patient is tolerating medications well.    Patient is monitoring Blood Pressure at home.   Home readings over past 7 days are:  114/76   113/77   123/71   125/74   104/74   118/74   122/75  Pulses in the 70's.    Current complaints: none  Disposition:  Pt is at goal.    Pt intends to return in August for annual exam.  He will establish with new provider due to Dr Diaz's snf.    Mi Mahoney RN

## 2021-06-16 DIAGNOSIS — I10 ESSENTIAL HYPERTENSION, BENIGN: ICD-10-CM

## 2021-06-16 RX ORDER — ATENOLOL 25 MG/1
TABLET ORAL
Qty: 90 TABLET | Refills: 2 | Status: SHIPPED | OUTPATIENT
Start: 2021-06-16 | End: 2021-08-16

## 2021-08-16 ENCOUNTER — OFFICE VISIT (OUTPATIENT)
Dept: FAMILY MEDICINE | Facility: CLINIC | Age: 76
End: 2021-08-16
Payer: COMMERCIAL

## 2021-08-16 VITALS
WEIGHT: 175.4 LBS | RESPIRATION RATE: 14 BRPM | TEMPERATURE: 97.1 F | BODY MASS INDEX: 27.53 KG/M2 | SYSTOLIC BLOOD PRESSURE: 132 MMHG | OXYGEN SATURATION: 97 % | DIASTOLIC BLOOD PRESSURE: 80 MMHG | HEIGHT: 67 IN | HEART RATE: 62 BPM

## 2021-08-16 DIAGNOSIS — Z12.5 SCREENING FOR PROSTATE CANCER: ICD-10-CM

## 2021-08-16 DIAGNOSIS — E78.5 HYPERLIPIDEMIA LDL GOAL <130: ICD-10-CM

## 2021-08-16 DIAGNOSIS — Z00.00 ENCOUNTER FOR MEDICARE ANNUAL WELLNESS EXAM: Primary | ICD-10-CM

## 2021-08-16 DIAGNOSIS — Z11.59 NEED FOR HEPATITIS C SCREENING TEST: ICD-10-CM

## 2021-08-16 DIAGNOSIS — R21 RASH AND OTHER NONSPECIFIC SKIN ERUPTION: ICD-10-CM

## 2021-08-16 DIAGNOSIS — J30.0 CHRONIC VASOMOTOR RHINITIS: ICD-10-CM

## 2021-08-16 DIAGNOSIS — I10 ESSENTIAL HYPERTENSION, BENIGN: ICD-10-CM

## 2021-08-16 LAB
ALT SERPL W P-5'-P-CCNC: 25 U/L (ref 0–70)
ANION GAP SERPL CALCULATED.3IONS-SCNC: 4 MMOL/L (ref 3–14)
BUN SERPL-MCNC: 22 MG/DL (ref 7–30)
CALCIUM SERPL-MCNC: 8.9 MG/DL (ref 8.5–10.1)
CHLORIDE BLD-SCNC: 107 MMOL/L (ref 94–109)
CHOLEST SERPL-MCNC: 169 MG/DL
CO2 SERPL-SCNC: 29 MMOL/L (ref 20–32)
CREAT SERPL-MCNC: 0.87 MG/DL (ref 0.66–1.25)
FASTING STATUS PATIENT QL REPORTED: YES
GFR SERPL CREATININE-BSD FRML MDRD: 83 ML/MIN/1.73M2
GLUCOSE BLD-MCNC: 88 MG/DL (ref 70–99)
HCV AB SERPL QL IA: NONREACTIVE
HDLC SERPL-MCNC: 53 MG/DL
LDLC SERPL CALC-MCNC: 86 MG/DL
NONHDLC SERPL-MCNC: 116 MG/DL
POTASSIUM BLD-SCNC: 4.1 MMOL/L (ref 3.4–5.3)
PSA SERPL-MCNC: 2.62 UG/L (ref 0–4)
SODIUM SERPL-SCNC: 140 MMOL/L (ref 133–144)
TRIGL SERPL-MCNC: 150 MG/DL

## 2021-08-16 PROCEDURE — 99214 OFFICE O/P EST MOD 30 MIN: CPT | Mod: 25 | Performed by: FAMILY MEDICINE

## 2021-08-16 PROCEDURE — 84460 ALANINE AMINO (ALT) (SGPT): CPT | Performed by: FAMILY MEDICINE

## 2021-08-16 PROCEDURE — 99397 PER PM REEVAL EST PAT 65+ YR: CPT | Performed by: FAMILY MEDICINE

## 2021-08-16 PROCEDURE — 36415 COLL VENOUS BLD VENIPUNCTURE: CPT | Performed by: FAMILY MEDICINE

## 2021-08-16 PROCEDURE — 80061 LIPID PANEL: CPT | Performed by: FAMILY MEDICINE

## 2021-08-16 PROCEDURE — G0103 PSA SCREENING: HCPCS | Performed by: FAMILY MEDICINE

## 2021-08-16 PROCEDURE — 80048 BASIC METABOLIC PNL TOTAL CA: CPT | Performed by: FAMILY MEDICINE

## 2021-08-16 PROCEDURE — 86803 HEPATITIS C AB TEST: CPT | Performed by: FAMILY MEDICINE

## 2021-08-16 RX ORDER — TRIAMCINOLONE ACETONIDE 5 MG/G
CREAM TOPICAL
Qty: 30 G | Refills: 3 | Status: SHIPPED | OUTPATIENT
Start: 2021-08-16

## 2021-08-16 RX ORDER — ATENOLOL 25 MG/1
25 TABLET ORAL DAILY
Qty: 90 TABLET | Refills: 3 | Status: SHIPPED | OUTPATIENT
Start: 2021-08-16 | End: 2022-06-20

## 2021-08-16 RX ORDER — FLUTICASONE PROPIONATE 50 MCG
1-2 SPRAY, SUSPENSION (ML) NASAL DAILY PRN
Qty: 51 G | Refills: 3 | Status: SHIPPED | OUTPATIENT
Start: 2021-08-16 | End: 2022-08-10

## 2021-08-16 RX ORDER — LOVASTATIN 40 MG
40 TABLET ORAL AT BEDTIME
Qty: 90 TABLET | Refills: 3 | Status: SHIPPED | OUTPATIENT
Start: 2021-08-16 | End: 2022-06-20

## 2021-08-16 ASSESSMENT — ACTIVITIES OF DAILY LIVING (ADL): CURRENT_FUNCTION: NO ASSISTANCE NEEDED

## 2021-08-16 ASSESSMENT — MIFFLIN-ST. JEOR: SCORE: 1479.24

## 2021-08-16 NOTE — PROGRESS NOTES
"SUBJECTIVE:   Velasquez Del Rio is a 75 year old male who presents for Preventive Visit.      Patient has been advised of split billing requirements and indicates understanding: Yes   Are you in the first 12 months of your Medicare coverage?  No    Healthy Habits:     In general, how would you rate your overall health?  Very good    Frequency of exercise:: 5 days per week.    Duration of exercise:: biking, stretching, sit ups, very active.    Do you usually eat at least 4 servings of fruit and vegetables a day, include whole grains    & fiber and avoid regularly eating high fat or \"junk\" foods?  Yes    Taking medications regularly:  Yes    Barriers to taking medications:  None    Medication side effects:  None    Ability to successfully perform activities of daily living:  No assistance needed    Home Safety:  No safety concerns identified    Hearing Impairment:  No hearing concerns (high pitch sounds harder to hear)    In the past 6 months, have you been bothered by leaking of urine? Yes (little, harder to hold than it used to be, happens occasionally )    In general, how would you rate your overall mental or emotional health?  Very good      PHQ-2 Total Score: 0    Additional concerns today:  No    Medication Followup of all medications - almost out of meds    Taking Medication as prescribed: yes    Side Effects:  None    Medication Helping Symptoms:  yes     Do you feel safe in your environment? Yes    Have you ever done Advance Care Planning? (For example, a Health Directive, POLST, or a discussion with a medical provider or your loved ones about your wishes): Yes, advance care planning is on file.    Fall risk  Fallen 2 or more times in the past year?: No  Any fall with injury in the past year?: No    Cognitive Screening   1) Repeat 3 items (Leader, Season, Table)    2) Clock draw: NORMAL  3) 3 item recall: Recalls 3 objects  Results: NORMAL clock, 1-2 items recalled: COGNITIVE IMPAIRMENT LESS LIKELY    Mini-CogTM " Copyright BERNIE Keith. Licensed by the author for use in [facility]; reprinted with permission (wilbur@[facility]). All rights reserved.      Do you have sleep apnea, excessive snoring or daytime drowsiness?: no    Reviewed and updated as needed this visit by clinical staff  Tobacco  Allergies  Meds   Med Hx  Surg Hx  Fam Hx  Soc Hx      Patient denies BM or urinary changes.  No fam hx of colon cancer before age 60.  No fam hx of prostate cancer.  Reviewed and updated as needed this visit by Provider                Social History     Tobacco Use     Smoking status: Former Smoker     Packs/day: 1.00     Years: 7.00     Pack years: 7.00     Types: Cigarettes     Quit date: 1968     Years since quittin.7     Smokeless tobacco: Never Used   Substance Use Topics     Alcohol use: Yes     Comment: minimal     If you drink alcohol do you typically have >3 drinks per day or >7 drinks per week? No    Alcohol Use 2021   Prescreen: >3 drinks/day or >7 drinks/week? No       Current providers sharing in care for this patient include:   Patient Care Team:  Uvaldo Olivera MD as PCP - General (Family Medicine)  Cecilio Diaz MD as Assigned PCP    The following health maintenance items are reviewed in Epic and correct as of today:  Health Maintenance Due   Topic Date Due     HEPATITIS C SCREENING  Never done     FALL RISK ASSESSMENT  2021     Patient Active Problem List   Diagnosis     Benign neoplasm of colon     Hyperlipidemia LDL goal <130     Advanced directives, counseling/discussion     Health Care Home     Herpes zoster     Diverticulosis of large intestine     Family history of colon cancer     Family history of malignant neoplasm of breast     Essential hypertension with goal blood pressure less than 140/90     Dermatitis     Chronic vasomotor rhinitis     Family history of rectal cancer     Past Surgical History:   Procedure Laterality Date     COLONOSCOPY       negative      COLONOSCOPY  2011    Procedure:COLONOSCOPY; Colonoscopy; Surgeon:DARCIE FISHER; Location:[facility]     COLONOSCOPY N/A 8/10/2016    Procedure: COLONOSCOPY;  Surgeon: Morales Renteria MD;  Location: [facility]     LAPAROSCOPIC HERNIORRHAPHY INGUINAL BILATERAL Bilateral 2019    Procedure: Laparoscopic bilateral inguinal hernia repair and open umbilical hernia repair;  Surgeon: Mckinley Colindres MD;  Location: [facility]       Social History     Tobacco Use     Smoking status: Former Smoker     Packs/day: 1.00     Years: 7.00     Pack years: 7.00     Types: Cigarettes     Quit date: 1968     Years since quittin.7     Smokeless tobacco: Never Used   Substance Use Topics     Alcohol use: Yes     Comment: minimal     Family History   Problem Relation Age of Onset     Hypertension Mother      Cerebrovascular Disease Mother          of a stroke     Cancer - colorectal Mother         age 70s     Prostate Cancer Father         had prostate removed     Diabetes Maternal Grandmother      Cancer Brother         bladder cancer     Colon Cancer Daughter 53        colon cancer surger     Breast Cancer Daughter      Asthma No family hx of      C.A.D. No family hx of      Melanoma No family hx of          Current Outpatient Medications   Medication Sig Dispense Refill     ASPIRIN 81 MG OR TABS 1 TABLET DAILY       atenolol (TENORMIN) 25 MG tablet Take 1 tablet (25 mg) by mouth daily 90 tablet 3     clindamycin (CLEOCIN T) 1 % external lotion Apply twice daily to affected area. 60 mL 1     fluticasone (FLONASE) 50 MCG/ACT nasal spray Spray 1-2 sprays into both nostrils daily as needed for rhinitis 51 g 3     lovastatin (MEVACOR) 40 MG tablet Take 1 tablet (40 mg) by mouth At Bedtime 90 tablet 3     triamcinolone (ARISTOCORT HP) 0.5 % external cream Apply topically twice a day as needed to affected area. Call to refill. 30 g 3     Allergies   Allergen Reactions     Nkda [No Known Drug Allergies]   "    Pneumonia Vaccine: UTD        Review of Systems  Constitutional, HEENT, cardiovascular, pulmonary, GI, , musculoskeletal, neuro, skin, endocrine and psych systems are negative, except as otherwise noted.    OBJECTIVE:   /80   Pulse 62   Temp 97.1  F (36.2  C) (Tympanic)   Resp 14   Ht 1.702 m (5' 7\")   Wt 79.6 kg (175 lb 6.4 oz)   SpO2 97%   BMI 27.47 kg/m   Estimated body mass index is 27.47 kg/m  as calculated from the following:    Height as of this encounter: 1.702 m (5' 7\").    Weight as of this encounter: 79.6 kg (175 lb 6.4 oz).  Physical Exam  GENERAL APPEARANCE: overweight,  alert and no distress  EYES: pink conj, no icterus, PERRL, EOMI  HENT: ear canals and TM's normal, nose and mouth without ulcers or lesions, oropharynx clear and oral mucous membranes moist  NECK: no adenopathy, no asymmetry, masses, or scars and thyroid normal to palpation  RESP: lungs clear to auscultation - no rales, rhonchi or wheezes  CV: regular rates and rhythm, normal S1 S2, no S3 or S4, no murmur, click or rub, no peripheral edema and peripheral pulses strong  ABDOMEN: soft, nontender, no hepatosplenomegaly, no masses and bowel sounds normal  RECTAL: deferred  MS: no musculoskeletal defects are noted and gait is age appropriate without ataxia  SKIN: no suspicious lesions or rashes  NEURO: Normal strength and tone, sensory exam grossly normal, mentation intact and speech normal    Diagnostic Test Results:  none     ASSESSMENT / PLAN:   Velasquez was seen today for physical.    Diagnoses and all orders for this visit:    Encounter for Medicare annual wellness exam    Essential hypertension, benign  -     atenolol (TENORMIN) 25 MG tablet; Take 1 tablet (25 mg) by mouth daily  -     Basic metabolic panel; Future  -     OFFICE/OUTPT VISIT,EST,LEVL IV  -     Basic metabolic panel  Controlled.  Low salt, low fat diet.   Exercise as tolerated.  Take meds as prescribed; call if with side effects.     Hyperlipidemia LDL " "goal <130  -     lovastatin (MEVACOR) 40 MG tablet; Take 1 tablet (40 mg) by mouth At Bedtime  -     ALT; Future  -     Lipid panel reflex to direct LDL Fasting; Future  -     OFFICE/OUTPT VISIT,EST,LEVL IV  -     ALT  -     Lipid panel reflex to direct LDL Fasting  Reinforced heart healthy lifestyle.\    Chronic vasomotor rhinitis  -     fluticasone (FLONASE) 50 MCG/ACT nasal spray; Spray 1-2 sprays into both nostrils daily as needed for rhinitis  -     OFFICE/OUTPT VISIT,EST,LEVL IV  Stable per patient.    Rash and other nonspecific skin eruption  -     triamcinolone (ARISTOCORT HP) 0.5 % external cream; Apply topically twice a day as needed to affected area. Call to refill.  -     OFFICE/OUTPT VISIT,EST,LEVL IV  Not active per patient.  Refilled med for prn use.    Screening for prostate cancer  -     Prostate Specific Antigen Screen; Future  -     Prostate Specific Antigen Screen  Refer to urology if abnormal.    Need for hepatitis C screening test  -     Hepatitis C Screen Reflex to HCV RNA Quant and Genotype; Future  -     Hepatitis C Screen Reflex to HCV RNA Quant and Genotype  Offered screening based on current recommendations. Patient concurred to screen.    Other orders  -     REVIEW OF HEALTH MAINTENANCE PROTOCOL ORDERS        Patient has been advised of split billing requirements and indicates understanding: Yes  COUNSELING:  Reviewed preventive health counseling, as reflected in patient instructions    Estimated body mass index is 27.47 kg/m  as calculated from the following:    Height as of this encounter: 1.702 m (5' 7\").    Weight as of this encounter: 79.6 kg (175 lb 6.4 oz).    Weight management plan: Discussed healthy diet and exercise guidelines    He reports that he quit smoking about 52 years ago. His smoking use included cigarettes. He has a 7.00 pack-year smoking history. He has never used smokeless tobacco.      Appropriate preventive services were discussed with this patient, including " applicable screening as appropriate for cardiovascular disease, diabetes, osteopenia/osteoporosis, and glaucoma.  As appropriate for age/gender, discussed screening for colorectal cancer, prostate cancer, breast cancer, and cervical cancer. Checklist reviewing preventive services available has been given to the patient.    Reviewed patients plan of care and provided an AVS. The Basic Care Plan (routine screening as documented in Health Maintenance) and Complex Care Plan (for patients with higher acuity and needing more deliberate coordination of services) for Velasquez meets the Care Plan requirement. This Care Plan has been established and reviewed with the Patient.    Counseling Resources:  ATP IV Guidelines  Pooled Cohorts Equation Calculator  Breast Cancer Risk Calculator  Breast Cancer: Medication to Reduce Risk  FRAX Risk Assessment  ICSI Preventive Guidelines  Dietary Guidelines for Americans, 2010  [employer] MyPlate  ASA Prophylaxis  Lung CA Screening    Uvaldo Olivera MD  [facility]    Identified Health Risks:    Information on urinary incontinence and treatment options given to patient.

## 2021-08-16 NOTE — PATIENT INSTRUCTIONS
You will be contacted in 1-2 days for results of your lab tests.    Be consistent with low salt, low trans fat and low saturated fat diet.  Eat food rich in omega-3-fatty acids as you tolerate. (salmon, olive oil)  Eat 5 cups of vegetables, fruits and whole grains per day.  Limit starchy food (white rice, white bread, white pasta, white potatoes) to less than a cup per meal.  Minimize sweets, junk food and fastfood. Limit soda beverages to one serving per day; best to avoid it altogether though.    Exercise: moderate intensity sustained for at least 30 mins per episode, goal of 150 mins per week at least  Combine cardiovascular and resistance exercises.  These exercise recommendations are in addition to your daily activity at work or home.  Work on losing weight.    Preventative Care Visits include: Yearly physicals, Well-child visits, Welcome to Medicare visits, & Medicare yearly wellness exams.    The purpose of these visits is to discuss your medical history and prevent health problems before you are sick.  You may need to pay a copay, coinsurance or deductible if your visit today includes testing or treating for a new or existing condition.    Additional charges may be incurred for today's visit. If you have questions about what your insurance plan covers, please contact your Insurance Company's member service department.  If you have questions specific to a bill you have already received from [employer], please contact the [facility] Billing Office at [phone].       Patient Education   Personalized Prevention Plan  You are due for the preventive services outlined below.  Your care team is available to assist you in scheduling these services.  If you have already completed any of these items, please share that information with your care team to update in your medical record.  Health Maintenance Due   Topic Date Due     ANNUAL REVIEW OF HM ORDERS  Never done     Hepatitis C Screening  Never done     Basic  Metabolic Panel  11/18/2014     Prostate Test  08/01/2020     Liver Monitoring Lab  08/12/2021     Cholesterol Lab  08/12/2021     FALL RISK ASSESSMENT  08/12/2021     Preventive Health Recommendations  See your health care provider every year to    Review health changes.     Discuss preventive care.      Review your medicines if your doctor has prescribed any.    Talk with your health care provider about whether you should have a test to screen for prostate cancer (PSA).    Every 3 years, have a diabetes test (fasting glucose). If you are at risk for diabetes, you should have this test more often.    Every 5 years, have a cholesterol test. Have this test more often if you are at risk for high cholesterol or heart disease.     Every 10 years, have a colonoscopy. Or, have a yearly FIT test (stool test). These exams will check for colon cancer.    Talk to with your health care provider about screening for Abdominal Aortic Aneurysm if you have a family history of AAA or have a history of smoking.    Shots:     Get a flu shot each year.     Get a tetanus shot every 10 years.     Talk to your doctor about your pneumonia vaccines. There are now two you should receive - Pneumovax (PPSV 23) and Prevnar (PCV 13).    Talk to your pharmacist about a shingles vaccine.     Talk to your doctor about the hepatitis B vaccine.    Nutrition:     Eat at least 5 servings of fruits and vegetables each day.     Eat whole-grain bread, whole-wheat pasta and brown rice instead of white grains and rice.     Get adequate Calcium and Vitamin D.     Lifestyle    Exercise for at least 150 minutes a week (30 minutes a day, 5 days a week). This will help you control your weight and prevent disease.     Limit alcohol to one drink per day.     No smoking.     Wear sunscreen to prevent skin cancer.     See your dentist every six months for an exam and cleaning.     See your eye doctor every 1 to 2 years to screen for conditions such as glaucoma,  macular degeneration and cataracts.    Personalized Prevention Plan  You are due for the preventive services outlined below.  Your care team is available to assist you in scheduling these services.  If you have already completed any of these items, please share that information with your care team to update in your medical record.  Health Maintenance   Topic Date Due     ANNUAL REVIEW OF HM ORDERS  Never done     HEPATITIS C SCREENING  Never done     BMP  11/18/2014     PSA  08/01/2020     ALT  08/12/2021     LIPID  08/12/2021     FALL RISK ASSESSMENT  08/12/2021     INFLUENZA VACCINE (1) 09/01/2021     MEDICARE ANNUAL WELLNESS VISIT  08/16/2022     DTAP/TDAP/TD IMMUNIZATION (3 - Td or Tdap) 09/24/2022     ADVANCE CARE PLANNING  08/16/2026     PHQ-2  Completed     Pneumococcal Vaccine: 65+ Years  Completed     ZOSTER IMMUNIZATION  Completed     COVID-19 Vaccine  Completed     IPV IMMUNIZATION  Aged Out     MENINGITIS IMMUNIZATION  Aged Out     HEPATITIS B IMMUNIZATION  Aged Out       Urinary Incontinence (Male)    Urinary incontinence means not being able to control the release of urine from the bladder.   Causes  Common causes of urinary incontinence in men include:    Infection    Certain medicines    Aging    Poor pelvic muscle tone    Bladder spasms    Obesity    Trouble urinating and fully emptying the bladder (urinary retention)  Other things that can cause incontinence are:     Nervous system diseases    Diabetes    Sleep apnea    Urinary tract infections    Prostate surgery    Pelvic injury  Constipation and smoking have also been identified as risk factors.   Symptoms    Urge incontinence (overactive bladder). This is a sudden urge to urinate. It occurs even though there may not be much urine in the bladder. The need to urinate often during the night is common. It's due to bladder spasms.    Stress incontinence. This is urine leakage that you can't control. It can occur with sneezing, coughing, and other  actions that put stress on the bladder.    Treatment  Treatment depends on what is causing the condition. Bladder infections are treated with antibiotics. Urinary retention is treated with a bladder catheter.   Home care  Follow these guidelines when caring for yourself at home:    Don't have any foods and drinks that may irritate the bladder. This includes:  ? Chocolate  ? Alcohol  ? Caffeine  ? Carbonated drinks  ? Acidic fruits and juices    Limit fluids to 6 to 8 cups a day.    Lose weight if you are overweight. This will reduce your symptoms.    If advised, do regular pelvic muscle-strengthening exercises such as Kegel exercises.    If needed, wear absorbent pads to catch urine. Change the pads often. This is for good hygiene and to prevent skin and bladder infections.    Bathe daily for good hygiene.    If an antibiotic was prescribed to treat a bladder infection, take it until it's finished. Keep taking it even if you are feeling better. This is to make sure your infection has cleared.    If a catheter was left in place, keep bacteria from getting into the collection bag. Don't disconnect the catheter from the collection bag.    Use a leg band to secure the catheter drainage tube, so it does not pull on the catheter. Drain the collection bag when it becomes full. To do this, use the drain spout at the bottom of the bag. Don't disconnect the bag from the catheter.    Don't pull on or try to remove a catheter. The catheter must be removed by a healthcare provider.    If you smoke, stop. Ask your provider for help if you can't do this on your own.  Follow-up care  Follow up with your healthcare provider, or as advised.  When to get medical advice  Call your healthcare provider right away if any of these occur:    Fever over 100.4 F (38 C), or as directed by your provider    Bladder pain or fullness    Belly swelling, nausea, or vomiting    Back pain    Weakness, dizziness, or fainting    If a catheter was left  in place, return if:  ? The catheter falls out  ? The catheter stops draining for 6 hours  ? Your urine gets cloudy or smells bad  [employer] last reviewed this educational content on 1/1/2020 2000-2021 [employer]. All rights reserved. This information is not intended as a substitute for professional medical care. Always follow your healthcare professional's instructions.

## 2021-08-16 NOTE — LETTER
August 18, 2021      Velasquez Del Rio  [address]  [facility] [place], [place] [address]        Dear ,    We are writing to inform you of your test results.      Aside from a borderline high triglyceride level, all other labs are in normal range.   Continue all recommendations given on your visit.   Repeat labs in a year.    Resulted Orders   Basic metabolic panel   Result Value Ref Range    Sodium 140 133 - 144 mmol/L    Potassium 4.1 3.4 - 5.3 mmol/L    Chloride 107 94 - 109 mmol/L    Carbon Dioxide (CO2) 29 20 - 32 mmol/L    Anion Gap 4 3 - 14 mmol/L    Urea Nitrogen 22 7 - 30 mg/dL    Creatinine 0.87 0.66 - 1.25 mg/dL    Calcium 8.9 8.5 - 10.1 mg/dL    Glucose 88 70 - 99 mg/dL    GFR Estimate 83 >60 mL/min/1.73m2      Comment:      As of July 11, 2021, eGFR is calculated by the CKD-EPI creatinine equation, without race adjustment. eGFR can be influenced by muscle mass, exercise, and diet. The reported eGFR is an estimation only and is only applicable if the renal function is stable.   ALT   Result Value Ref Range    ALT 25 0 - 70 U/L   Lipid panel reflex to direct LDL Fasting   Result Value Ref Range    Cholesterol 169 <200 mg/dL      Comment:      Age 0-19 years  Desirable: <170 mg/dL  Borderline high:  170-199 mg/dl  High:            >199 mg/dl    Age 20 years and older  Desirable: <200 mg/dL    Triglycerides 150 (H) <150 mg/dL      Comment:      0-9 years:  Normal:    Less than 75 mg/dL  Borderline high:  75-99 mg/dL  High:             Greater than or equal to 100 mg/dL    0-19 years:  Normal:    Less than 90 mg/dL  Borderline high:   mg/dL  High:             Greater than or equal to 130 mg/dL    20 years and older:  Normal:    Less than 150 mg/dL  Borderline high:  150-199 mg/dL  High:             200-499 mg/dL  Very high:   Greater than or equal to 500 mg/dL    Direct Measure HDL 53 >=40 mg/dL      Comment:      0-19 years:       Greater than or equal to 45 mg/dL   Low: Less than 40 mg/dL   Borderline  low: 40-44 mg/dL     20 years and older:   Female: Greater than or equal to 50 mg/dL   Male:   Greater than or equal to 40 mg/dL         LDL Cholesterol Calculated 86 <=100 mg/dL      Comment:      Age 0-19 years:  Desirable: 0-110 mg/dL   Borderline high: 110-129 mg/dL   High: >= 130 mg/dL    Age 20 years and older:  Desirable: <100mg/dL  Above desirable: 100-129 mg/dL   Borderline high: 130-159 mg/dL   High: 160-189 mg/dL   Very high: >= 190 mg/dL    Non HDL Cholesterol 116 <130 mg/dL      Comment:      0-19 years:  Desirable:          Less than 120 mg/dL  Borderline high:   120-144 mg/dL  High:                   Greater than or equal to 145 mg/dL    20 years and older:  Desirable:          130 mg/dL  Above Desirable: 130-159 mg/dL  Borderline high:   160-189 mg/dL  High:               190-219 mg/dL  Very high:     Greater than or equal to 220 mg/dL    Patient Fasting > 8hrs? Yes    Prostate Specific Antigen Screen   Result Value Ref Range    Prostate Specific Antigen Screen 2.62 0.00 - 4.00 ug/L   Hepatitis C Screen Reflex to HCV RNA Quant and Genotype   Result Value Ref Range    Hepatitis C Antibody Nonreactive Nonreactive    Narrative    Assay performance characteristics have not been established for newborns, infants, and children.       If you have any questions or concerns, please call the clinic at the number listed above.       Sincerely,      Uvaldo Olivera MD

## 2021-10-01 ENCOUNTER — IMMUNIZATION (OUTPATIENT)
Dept: FAMILY MEDICINE | Facility: CLINIC | Age: 76
End: 2021-10-01
Payer: COMMERCIAL

## 2021-10-01 PROCEDURE — 90662 IIV NO PRSV INCREASED AG IM: CPT

## 2021-10-01 PROCEDURE — G0008 ADMIN INFLUENZA VIRUS VAC: HCPCS

## 2021-10-11 ENCOUNTER — IMMUNIZATION (OUTPATIENT)
Dept: FAMILY MEDICINE | Facility: CLINIC | Age: 76
End: 2021-10-11
Payer: COMMERCIAL

## 2021-10-11 PROCEDURE — 0004A PR COVID VAC PFIZER DIL RECON 30 MCG/0.3 ML IM: CPT

## 2021-10-11 PROCEDURE — 91300 PR COVID VAC PFIZER DIL RECON 30 MCG/0.3 ML IM: CPT

## 2022-05-27 ENCOUNTER — OFFICE VISIT (OUTPATIENT)
Dept: UROLOGY | Facility: CLINIC | Age: 77
End: 2022-05-27
Payer: COMMERCIAL

## 2022-05-27 VITALS — HEART RATE: 81 BPM | SYSTOLIC BLOOD PRESSURE: 144 MMHG | DIASTOLIC BLOOD PRESSURE: 80 MMHG | TEMPERATURE: 97.4 F

## 2022-05-27 DIAGNOSIS — R39.14 BENIGN PROSTATIC HYPERPLASIA WITH INCOMPLETE BLADDER EMPTYING: Primary | ICD-10-CM

## 2022-05-27 DIAGNOSIS — N40.1 BENIGN PROSTATIC HYPERPLASIA WITH INCOMPLETE BLADDER EMPTYING: Primary | ICD-10-CM

## 2022-05-27 LAB
ALBUMIN UR-MCNC: NEGATIVE MG/DL
APPEARANCE UR: CLEAR
BILIRUB UR QL STRIP: NEGATIVE
COLOR UR AUTO: YELLOW
GLUCOSE UR STRIP-MCNC: NEGATIVE MG/DL
HGB UR QL STRIP: NEGATIVE
KETONES UR STRIP-MCNC: NEGATIVE MG/DL
LEUKOCYTE ESTERASE UR QL STRIP: NEGATIVE
NITRATE UR QL: NEGATIVE
PH UR STRIP: 5.5 [PH] (ref 5–7)
RBC #/AREA URNS AUTO: NORMAL /HPF
SP GR UR STRIP: >=1.03 (ref 1–1.03)
UROBILINOGEN UR STRIP-ACNC: 0.2 E.U./DL
WBC #/AREA URNS AUTO: NORMAL /HPF

## 2022-05-27 PROCEDURE — 87086 URINE CULTURE/COLONY COUNT: CPT | Performed by: STUDENT IN AN ORGANIZED HEALTH CARE EDUCATION/TRAINING PROGRAM

## 2022-05-27 PROCEDURE — 99203 OFFICE O/P NEW LOW 30 MIN: CPT | Mod: 25 | Performed by: STUDENT IN AN ORGANIZED HEALTH CARE EDUCATION/TRAINING PROGRAM

## 2022-05-27 PROCEDURE — 51798 US URINE CAPACITY MEASURE: CPT | Performed by: STUDENT IN AN ORGANIZED HEALTH CARE EDUCATION/TRAINING PROGRAM

## 2022-05-27 PROCEDURE — 81001 URINALYSIS AUTO W/SCOPE: CPT | Performed by: STUDENT IN AN ORGANIZED HEALTH CARE EDUCATION/TRAINING PROGRAM

## 2022-05-27 RX ORDER — TAMSULOSIN HYDROCHLORIDE 0.4 MG/1
0.4 CAPSULE ORAL DAILY
Qty: 90 CAPSULE | Refills: 1 | Status: SHIPPED | OUTPATIENT
Start: 2022-05-27 | End: 2022-08-10

## 2022-05-27 ASSESSMENT — PAIN SCALES - GENERAL: PAINLEVEL: NO PAIN (0)

## 2022-05-27 NOTE — NURSING NOTE
"Initial BP (!) 144/80 (BP Location: Left arm, Patient Position: Sitting, Cuff Size: Adult Regular)   Pulse 81   Temp 97.4  F (36.3  C) (Tympanic)  Estimated body mass index is 27.47 kg/m  as calculated from the following:    Height as of 8/16/21: 1.702 m (5' 7\").    Weight as of 8/16/21: 79.6 kg (175 lb 6.4 oz). .    Sita Yates LPN on 5/27/2022 at 2:08 PM    "

## 2022-05-27 NOTE — PROGRESS NOTES
Chief Complaint:   LUTS         History of Present Illness:   Velasquez Del Rio is a 76 year old male with a history of HTN, dermatitis, diverticulosis, and HLD who presents for evaluation of LUTS.     His PSA was 2.62 on 8/16/2021.     He reports ongoing urinary symptoms for the last 12 to 15 years including urinary urgency, hesitancy, slow stream, nocturia, intermittently straining to void, and sensation of incomplete emptying.  He reports that the issues with his urinary stream are most notable at night.  He states it is easier to urinate after he is up and moving around.  He denies dysuria and gross hematuria.  He has regular bowel movements.    He primarily drinks water during the day.  He occasionally has soda and alcohol.  He denies coffee intake.         Past Medical History:   No past medical history on file.         Past Surgical History:     Past Surgical History:   Procedure Laterality Date     COLONOSCOPY  2001    negative      COLONOSCOPY  12/13/2011    Procedure:COLONOSCOPY; Colonoscopy; Surgeon:DARCIE FISHER; Location:[facility]     COLONOSCOPY N/A 8/10/2016    Procedure: COLONOSCOPY;  Surgeon: Morales Renteria MD;  Location: [facility]     LAPAROSCOPIC HERNIORRHAPHY INGUINAL BILATERAL Bilateral 4/16/2019    Procedure: Laparoscopic bilateral inguinal hernia repair and open umbilical hernia repair;  Surgeon: Mckinley Colindres MD;  Location: [facility]            Medications     Current Outpatient Medications   Medication     ASPIRIN 81 MG OR TABS     atenolol (TENORMIN) 25 MG tablet     clindamycin (CLEOCIN T) 1 % external lotion     fluticasone (FLONASE) 50 MCG/ACT nasal spray     lovastatin (MEVACOR) 40 MG tablet     triamcinolone (ARISTOCORT HP) 0.5 % external cream     No current facility-administered medications for this visit.            Allergies:   Nkda [no known drug allergies]         Review of Systems:  From intake questionnaire   Negative 14 system review except as noted on HPI, nurse's  note.         Physical Exam:   Patient is a 76 year old  male   Vitals: Blood pressure (!) 144/80, pulse 81, temperature 97.4  F (36.3  C), temperature source Tympanic.  General Appearance Adult: Alert, no acute distress, oriented.  Lungs: Non-labored breathing.  Heart: No obvious jugular venous distension present.  Neuro: Alert, oriented, speech and mentation normal    PVR: 154 mL      Labs and Pathology:    I personally reviewed all applicable laboratory data and went over findings with patient  Significant for:     BMP RESULTS:  Recent Labs   Lab Test 08/16/21  1044 08/01/19  1050 04/11/19  1116 07/22/16  0953     --   --   --    POTASSIUM 4.1  --   --   --    CHLORIDE 107  --   --   --    CO2 29  --   --   --    ANIONGAP 4  --   --   --    GLC 88 90  --  93   BUN 22  --   --   --    CR 0.87  --  0.86  --    GFRESTIMATED 83  --  84  --    GFRESTBLACK  --   --  >90  --    PRINCE 8.9  --   --   --        PSA RESULTS  PSA   Date Value Ref Range Status   08/01/2019 2.10 0 - 4 ug/L Final     Comment:     Assay Method:  Chemiluminescence using Siemens Vista analyzer   11/18/2013 1.99 0 - 4 ug/L Final   11/15/2012 1.35 0 - 4 ug/L Final   11/14/2011 1.13 0 - 4 ug/L Final   11/12/2010 1.16 0 - 4 ug/L Final   11/13/2009 0.94 0 - 4 ug/L Final   11/10/2008 1.17 0 - 4 ug/L Final   10/09/2007 0.67 0 - 4 ug/L Final   09/02/2005 0.71 0.04 - 4.00 ng/mL      Comment:     [facility], [facility], [address] [place] [id number]     Prostate Specific Antigen Screen   Date Value Ref Range Status   08/16/2021 2.62 0.00 - 4.00 ug/L Final            Assessment and Plan:     Assessment: 76 year old male with a several year history of urinary urgency, hesitancy, sensation of incomplete bladder emptying, straining to void, and nocturia.  His PVR today was slightly elevated at 154 mL.    Discussed that his symptoms are very likely due to bladder outlet obstruction secondary to prostatic enlargement.  We discussed a  trial of tamsulosin, patient is interested in starting this.    Plan:  1.  Start tamsulosin 0.4 mg daily.  Side effects reviewed.  Patient was advised to take this medication in the evening.  2.  Follow-up in about 3 months.    JAYSHREE GILL PA-C  Department of Urology

## 2022-05-27 NOTE — PROGRESS NOTES
Active order to obtain bladder scan? Yes   Name of ordering provider:  RENETTA Tomlinson   Bladder scan preformed post void Yes:   Bladder scan reveled 154 ML  Provider notified?  Yes    Sita Yates LPN

## 2022-05-29 LAB — BACTERIA UR CULT: NO GROWTH

## 2022-07-19 ENCOUNTER — TELEPHONE (OUTPATIENT)
Dept: FAMILY MEDICINE | Facility: CLINIC | Age: 77
End: 2022-07-19

## 2022-07-19 DIAGNOSIS — Z12.5 SCREENING FOR PROSTATE CANCER: ICD-10-CM

## 2022-07-19 DIAGNOSIS — E78.5 HYPERLIPIDEMIA LDL GOAL <130: ICD-10-CM

## 2022-07-19 DIAGNOSIS — I10 ESSENTIAL HYPERTENSION, BENIGN: Primary | ICD-10-CM

## 2022-07-19 NOTE — TELEPHONE ENCOUNTER
Reason for Call: Request for an order or referral:    Order or referral being requested: ORDERS NEEDED FOR ANNUAL WELLNESS AND MEDICATION REVIEW    Date needed: as soon as possible    Has the patient been seen by the PCP for this problem? YES    Additional comments: APPT HAS BEEN SCHEDULED    Phone number Patient can be reached at:  Cell number on file:    Telephone Information:   Mobile [phone]       Best Time:  NO CALL NECESSARY    Can we leave a detailed message on this number?  YES    Call taken on 7/19/2022 at 11:27 AM by Lise Blanco

## 2022-07-25 ENCOUNTER — LAB (OUTPATIENT)
Dept: LAB | Facility: CLINIC | Age: 77
End: 2022-07-25
Payer: COMMERCIAL

## 2022-07-25 DIAGNOSIS — E78.5 HYPERLIPIDEMIA LDL GOAL <130: ICD-10-CM

## 2022-07-25 DIAGNOSIS — Z12.5 SCREENING FOR PROSTATE CANCER: ICD-10-CM

## 2022-07-25 DIAGNOSIS — I10 ESSENTIAL HYPERTENSION, BENIGN: ICD-10-CM

## 2022-07-25 LAB
ALT SERPL W P-5'-P-CCNC: 25 U/L (ref 0–70)
ANION GAP SERPL CALCULATED.3IONS-SCNC: 3 MMOL/L (ref 3–14)
BUN SERPL-MCNC: 24 MG/DL (ref 7–30)
CALCIUM SERPL-MCNC: 8.7 MG/DL (ref 8.5–10.1)
CHLORIDE BLD-SCNC: 106 MMOL/L (ref 94–109)
CHOLEST SERPL-MCNC: 135 MG/DL
CO2 SERPL-SCNC: 29 MMOL/L (ref 20–32)
CREAT SERPL-MCNC: 0.83 MG/DL (ref 0.66–1.25)
FASTING STATUS PATIENT QL REPORTED: YES
GFR SERPL CREATININE-BSD FRML MDRD: 90 ML/MIN/1.73M2
GLUCOSE BLD-MCNC: 99 MG/DL (ref 70–99)
HDLC SERPL-MCNC: 44 MG/DL
LDLC SERPL CALC-MCNC: 69 MG/DL
NONHDLC SERPL-MCNC: 91 MG/DL
POTASSIUM BLD-SCNC: 4 MMOL/L (ref 3.4–5.3)
PSA SERPL-MCNC: 2.76 UG/L (ref 0–4)
SODIUM SERPL-SCNC: 138 MMOL/L (ref 133–144)
TRIGL SERPL-MCNC: 110 MG/DL

## 2022-07-25 PROCEDURE — 80048 BASIC METABOLIC PNL TOTAL CA: CPT

## 2022-07-25 PROCEDURE — 36415 COLL VENOUS BLD VENIPUNCTURE: CPT

## 2022-07-25 PROCEDURE — 84460 ALANINE AMINO (ALT) (SGPT): CPT

## 2022-07-25 PROCEDURE — 80061 LIPID PANEL: CPT

## 2022-07-25 PROCEDURE — G0103 PSA SCREENING: HCPCS

## 2022-08-10 ENCOUNTER — OFFICE VISIT (OUTPATIENT)
Dept: FAMILY MEDICINE | Facility: CLINIC | Age: 77
End: 2022-08-10
Payer: COMMERCIAL

## 2022-08-10 VITALS
HEART RATE: 80 BPM | TEMPERATURE: 98.3 F | BODY MASS INDEX: 28.63 KG/M2 | SYSTOLIC BLOOD PRESSURE: 136 MMHG | RESPIRATION RATE: 16 BRPM | WEIGHT: 182.4 LBS | OXYGEN SATURATION: 97 % | HEIGHT: 67 IN | DIASTOLIC BLOOD PRESSURE: 78 MMHG

## 2022-08-10 DIAGNOSIS — R39.14 BENIGN PROSTATIC HYPERPLASIA WITH INCOMPLETE BLADDER EMPTYING: ICD-10-CM

## 2022-08-10 DIAGNOSIS — N40.1 BENIGN PROSTATIC HYPERPLASIA WITH INCOMPLETE BLADDER EMPTYING: ICD-10-CM

## 2022-08-10 DIAGNOSIS — E78.5 HYPERLIPIDEMIA LDL GOAL <130: ICD-10-CM

## 2022-08-10 DIAGNOSIS — Z12.11 SCREENING FOR MALIGNANT NEOPLASM OF COLON: ICD-10-CM

## 2022-08-10 DIAGNOSIS — Z00.00 ENCOUNTER FOR MEDICARE ANNUAL WELLNESS EXAM: Primary | ICD-10-CM

## 2022-08-10 DIAGNOSIS — I10 ESSENTIAL HYPERTENSION, BENIGN: ICD-10-CM

## 2022-08-10 DIAGNOSIS — J30.0 CHRONIC VASOMOTOR RHINITIS: ICD-10-CM

## 2022-08-10 PROCEDURE — G0439 PPPS, SUBSEQ VISIT: HCPCS | Performed by: FAMILY MEDICINE

## 2022-08-10 PROCEDURE — 99214 OFFICE O/P EST MOD 30 MIN: CPT | Mod: 25 | Performed by: FAMILY MEDICINE

## 2022-08-10 RX ORDER — LOVASTATIN 40 MG
40 TABLET ORAL AT BEDTIME
Qty: 90 TABLET | Refills: 3 | Status: SHIPPED | OUTPATIENT
Start: 2022-08-10 | End: 2023-06-26

## 2022-08-10 RX ORDER — TAMSULOSIN HYDROCHLORIDE 0.4 MG/1
0.4 CAPSULE ORAL DAILY
Qty: 90 CAPSULE | Refills: 3 | Status: SHIPPED | OUTPATIENT
Start: 2022-08-10 | End: 2023-06-26

## 2022-08-10 RX ORDER — ATENOLOL 25 MG/1
25 TABLET ORAL DAILY
Qty: 90 TABLET | Refills: 3 | Status: SHIPPED | OUTPATIENT
Start: 2022-08-10 | End: 2023-06-26

## 2022-08-10 RX ORDER — FLUTICASONE PROPIONATE 50 MCG
1-2 SPRAY, SUSPENSION (ML) NASAL DAILY PRN
Qty: 51 G | Refills: 3 | Status: SHIPPED | OUTPATIENT
Start: 2022-08-10 | End: 2024-06-27

## 2022-08-10 ASSESSMENT — ACTIVITIES OF DAILY LIVING (ADL): CURRENT_FUNCTION: NO ASSISTANCE NEEDED

## 2022-08-10 NOTE — PROGRESS NOTES
"SUBJECTIVE:   Velasquez Del Rio is a 76 year old male who presents for Preventive Visit.      Patient has been advised of split billing requirements and indicates understanding: Yes  Are you in the first 12 months of your Medicare coverage?  No    Healthy Habits:     In general, how would you rate your overall health?  Good    Frequency of exercise:  4-5 days/week    Duration of exercise:  15-30 minutes    Do you usually eat at least 4 servings of fruit and vegetables a day, include whole grains    & fiber and avoid regularly eating high fat or \"junk\" foods?  Yes    Taking medications regularly:  Yes    Ability to successfully perform activities of daily living:  No assistance needed    Home Safety:  No safety concerns identified    Hearing Impairment:  Difficulty following a conversation in a noisy restaurant or crowded room    In the past 6 months, have you been bothered by leaking of urine? Yes    In general, how would you rate your overall mental or emotional health?  Good      PHQ-2 Total Score: 0    Additional concerns today:  No    Do you feel safe in your environment? Yes    Have you ever done Advance Care Planning? (For example, a Health Directive, POLST, or a discussion with a medical provider or your loved ones about your wishes): Yes, advance care planning is on file.    Fall risk  Fallen 2 or more times in the past year?: No  Any fall with injury in the past year?: No    Cognitive Screening   1) Repeat 3 items (Leader, Season, Table)    2) Clock draw: NORMAL  3) 3 item recall: Recalls 3 objects  Results: 3 items recalled: COGNITIVE IMPAIRMENT LESS LIKELY    Mini-CogTM Copyright BERNIE Keith. Licensed by the author for use in [facility]; reprinted with permission (wilbur@.[facility]). All rights reserved.      Do you have sleep apnea, excessive snoring or daytime drowsiness?: no    Reviewed and updated as needed this visit by clinical staff   Tobacco  Allergies  Meds  Problems  Med Hx  Surg Hx  Fam " Hx  Soc   Hx          Reviewed and updated as needed this visit by Provider   Tobacco  Allergies  Meds  Problems              Social History     Tobacco Use     Smoking status: Former Smoker     Packs/day: 1.00     Years: 7.00     Pack years: 7.00     Types: Cigarettes     Quit date: 1968     Years since quittin.7     Smokeless tobacco: Never Used   Substance Use Topics     Alcohol use: Yes     Comment: minimal     If you drink alcohol do you typically have >3 drinks per day or >7 drinks per week? No    Alcohol Use 8/10/2022   Prescreen: >3 drinks/day or >7 drinks/week? No   Prescreen: >3 drinks/day or >7 drinks/week? -   No flowsheet data found.        Hyperlipidemia Follow-Up      Are you regularly taking any medication or supplement to lower your cholesterol?   Yes- lovastatin    Are you having muscle aches or other side effects that you think could be caused by your cholesterol lowering medication?  No    Hypertension Follow-up      Do you check your blood pressure regularly outside of the clinic? No     Are you following a low salt diet? Yes , no extra salt added.    Are your blood pressures ever more than 140 on the top number (systolic) OR more   than 90 on the bottom number (diastolic), for example 140/90?       Current providers sharing in care for this patient include:   Patient Care Team:  Uvaldo Olivera MD as PCP - General (Family Medicine)  Uvaldo Olivera MD as Assigned PCP  Gia Rolle PA-C as Assigned Surgical Provider    The following health maintenance items are reviewed in Epic and correct as of today:  There are no preventive care reminders to display for this patient.  Patient Active Problem List   Diagnosis     Benign neoplasm of colon     Hyperlipidemia LDL goal <130     Advanced directives, counseling/discussion     Health Care Home     Herpes zoster     Diverticulosis of large intestine     Family history of colon cancer     Family history of  malignant neoplasm of breast     Essential hypertension with goal blood pressure less than 140/90     Dermatitis     Chronic vasomotor rhinitis     Family history of rectal cancer     Past Surgical History:   Procedure Laterality Date     COLONOSCOPY  2001    negative      COLONOSCOPY  2011    Procedure:COLONOSCOPY; Colonoscopy; Surgeon:DARCIE FISHER; Location:[facility]     COLONOSCOPY N/A 8/10/2016    Procedure: COLONOSCOPY;  Surgeon: Morales Renteria MD;  Location: [facility]     LAPAROSCOPIC HERNIORRHAPHY INGUINAL BILATERAL Bilateral 2019    Procedure: Laparoscopic bilateral inguinal hernia repair and open umbilical hernia repair;  Surgeon: Mckinley Colindres MD;  Location: [facility]       Social History     Tobacco Use     Smoking status: Former Smoker     Packs/day: 1.00     Years: 7.00     Pack years: 7.00     Types: Cigarettes     Quit date: 1968     Years since quittin.7     Smokeless tobacco: Never Used   Substance Use Topics     Alcohol use: Yes     Comment: minimal     Family History   Problem Relation Age of Onset     Hypertension Mother      Cerebrovascular Disease Mother          of a stroke     Cancer - colorectal Mother         age 70s     Prostate Cancer Father         had prostate removed     Diabetes Maternal Grandmother      Cancer Brother         bladder cancer     Colon Cancer Daughter 53        colon cancer surger     Breast Cancer Daughter      Asthma No family hx of      C.A.D. No family hx of      Melanoma No family hx of          Current Outpatient Medications   Medication Sig Dispense Refill     ASPIRIN 81 MG OR TABS 1 TABLET DAILY       atenolol (TENORMIN) 25 MG tablet Take 1 tablet (25 mg) by mouth daily 90 tablet 3     fluticasone (FLONASE) 50 MCG/ACT nasal spray Spray 1-2 sprays into both nostrils daily as needed for rhinitis 51 g 3     lovastatin (MEVACOR) 40 MG tablet Take 1 tablet (40 mg) by mouth At Bedtime 90 tablet 3     tamsulosin (FLOMAX) 0.4 MG capsule Take  "1 capsule (0.4 mg) by mouth daily 90 capsule 3     clindamycin (CLEOCIN T) 1 % external lotion Apply twice daily to affected area. (Patient not taking: No sig reported) 60 mL 1     triamcinolone (ARISTOCORT HP) 0.5 % external cream Apply topically twice a day as needed to affected area. Call to refill. (Patient not taking: No sig reported) 30 g 3     Allergies   Allergen Reactions     Nkda [No Known Drug Allergies]      Pneumonia Vaccine: UTD    Medication Followup of all prescribed meds    Taking Medication as prescribed: yes    Side Effects:  None    Medication Helping Symptoms:  yes        Review of Systems   Genitourinary: Positive for impotence and urgency.   has seen urologist for the urinary symptoms. Improved by the tamsulosin.   Constitutional, HEENT, cardiovascular, pulmonary, GI, , musculoskeletal, neuro, skin, endocrine and psych systems are negative, except as otherwise noted.    OBJECTIVE:   /78   Pulse 80   Temp 98.3  F (36.8  C) (Tympanic)   Resp 16   Ht 1.702 m (5' 7\")   Wt 82.7 kg (182 lb 6.4 oz)   SpO2 97%   BMI 28.57 kg/m   Estimated body mass index is 28.57 kg/m  as calculated from the following:    Height as of this encounter: 1.702 m (5' 7\").    Weight as of this encounter: 82.7 kg (182 lb 6.4 oz).  Physical Exam  GENERAL APPEARANCE: overweight, alert and no distress  EYES: pink conj, no icterus, PERRL, EOMI  HENT: ear canals and TM's normal, nose and mouth without ulcers or lesions, oropharynx clear and oral mucous membranes moist  NECK: no adenopathy, no asymmetry, masses, or scars and thyroid normal to palpation  RESP: lungs clear to auscultation - no rales, rhonchi or wheezes  CV: regular rates and rhythm, normal S1 S2, no S3 or S4, no murmur, click or rub, no peripheral edema and peripheral pulses strong  ABDOMEN: soft, nontender, no hepatosplenomegaly, no masses and bowel sounds normal  RECTAL: deferred  MS: no musculoskeletal defects are noted and gait is age appropriate " without ataxia  SKIN: no suspicious lesions or rashes  NEURO: Normal strength and tone, sensory exam grossly normal, mentation intact and speech normal    Diagnostic Test Results:  Labs reviewed in Epic    ASSESSMENT / PLAN:   Velasquez was seen today for physical.    Diagnoses and all orders for this visit:    Encounter for Medicare annual wellness exam  Patient was advised on recommended screening and preventive health recommendations.  He verbalized understanding and agreed to the plans below.    Screening for malignant neoplasm of colon  -     Colonscopy Screening  Referral; Future  Patient was advised of the current guidelines on colon cancer screening after 76 y/o.  Patient preferred to do one more colonoscopy this time.    Essential hypertension, benign  -     atenolol (TENORMIN) 25 MG tablet; Take 1 tablet (25 mg) by mouth daily  -     OFFICE/OUTPT VISIT,EST,LEVL III  Controlled.  Low salt, low fat diet.   Exercise as tolerated.  Take meds as prescribed; call if with side effects.     Hyperlipidemia LDL goal <130  -     lovastatin (MEVACOR) 40 MG tablet; Take 1 tablet (40 mg) by mouth At Bedtime  -     OFFICE/OUTPT VISIT,EST,LEVL III  Reinforced heart healthy lifestyle.    Benign prostatic hyperplasia with incomplete bladder emptying  -     tamsulosin (FLOMAX) 0.4 MG capsule; Take 1 capsule (0.4 mg) by mouth daily  -     OFFICE/OUTPT VISIT,EST,LEVL III  Stable per patient  Sees urology.    Chronic vasomotor rhinitis  -     fluticasone (FLONASE) 50 MCG/ACT nasal spray; Spray 1-2 sprays into both nostrils daily as needed for rhinitis  -     OFFICE/OUTPT VISIT,EST,LEVL III  Stable per patient.  Denies need for further treatment or specialist consult.    Other orders  -     REVIEW OF HEALTH MAINTENANCE PROTOCOL ORDERS        Patient has been advised of split billing requirements and indicates understanding: Yes    COUNSELING:  Reviewed preventive health counseling, as reflected in patient  "instructions    Estimated body mass index is 28.57 kg/m  as calculated from the following:    Height as of this encounter: 1.702 m (5' 7\").    Weight as of this encounter: 82.7 kg (182 lb 6.4 oz).    Weight management plan: Discussed healthy diet and exercise guidelines    He reports that he quit smoking about 53 years ago. His smoking use included cigarettes. He has a 7.00 pack-year smoking history. He has never used smokeless tobacco.      Appropriate preventive services were discussed with this patient, including applicable screening as appropriate for cardiovascular disease, diabetes, osteopenia/osteoporosis, and glaucoma.  As appropriate for age/gender, discussed screening for colorectal cancer, prostate cancer, breast cancer, and cervical cancer. Checklist reviewing preventive services available has been given to the patient.    Reviewed patients plan of care and provided an AVS. The Basic Care Plan (routine screening as documented in Health Maintenance) for Velasquez meets the Care Plan requirement. This Care Plan has been established and reviewed with the Patient.    Counseling Resources:  ATP IV Guidelines  Pooled Cohorts Equation Calculator  Breast Cancer Risk Calculator  Breast Cancer: Medication to Reduce Risk  FRAX Risk Assessment  ICSI Preventive Guidelines  Dietary Guidelines for Americans, 2010  [employer]'s MyPlate  ASA Prophylaxis  Lung CA Screening    Uvaldo Olivera MD  [facility]    Identified Health Risks:    The patient was provided with written information regarding signs of hearing loss.  Information on urinary incontinence and treatment options given to patient.  "

## 2022-08-10 NOTE — PATIENT INSTRUCTIONS
Apply debrox 5-10 drops to right ear canal 2 times a day for next 2 weeks. Return to clinic after 2 weeks for recheck and possible earwax removal.     Medications have been refilled.    Be consistent with low salt, low trans fat and low saturated fat diet.  Eat food rich in omega-3-fatty acids as you tolerate. (salmon, olive oil)  Eat 5 cups of vegetables, fruits and whole grains per day.  Limit starchy food (white rice, white bread, white pasta, white potatoes) to less than a cup per meal.  Minimize sweets, junk food and fastfood. Limit soda beverages to one serving per day; best to avoid it altogether though.    Exercise: moderate intensity sustained for at least 30 mins per episode, goal of 150 mins per week at least  Combine cardiovascular and resistance exercises.  These exercise recommendations are in addition to your daily activity at work or home.  Work on losing weight.    You decided to pursue one more colonoscopy.  Colonoscopy  will call you in the next 3-5 business days to set up appointment for the procedure. Referral information is in your visit summary also.     Preventative Care Visits include: Yearly physicals, Well-child visits, Welcome to Medicare visits, & Medicare yearly wellness exams.    The purpose of these visits is to discuss your medical history and prevent health problems before you are sick.  You may need to pay a copay, coinsurance or deductible if your visit today includes testing or treating for a new or existing condition.    Additional charges may be incurred for today's visit. If you have questions about what your insurance plan covers, please contact your Insurance Company's member service department.  If you have questions specific to a bill you have already received from [facility], please contact the [facility] at [phone].      Patient Education   Personalized Prevention Plan  You are due for the preventive services outlined below.  Your care team is  available to assist you in scheduling these services.  If you have already completed any of these items, please share that information with your care team to update in your medical record.  Health Maintenance Due   Topic Date Due    Annual Wellness Visit  08/16/2022    ANNUAL REVIEW OF HM ORDERS  08/16/2022        Patient Education   Personalized Prevention Plan  You are due for the preventive services outlined below.  Your care team is available to assist you in scheduling these services.  If you have already completed any of these items, please share that information with your care team to update in your medical record.  There are no preventive care reminders to display for this patient.  Preventive Health Recommendations  See your health care provider every year to  Review health changes.   Discuss preventive care.    Review your medicines if your doctor has prescribed any.  Talk with your health care provider about whether you should have a test to screen for prostate cancer (PSA).  Every 3 years, have a diabetes test (fasting glucose). If you are at risk for diabetes, you should have this test more often.  Every 5 years, have a cholesterol test. Have this test more often if you are at risk for high cholesterol or heart disease.   Every 10 years, have a colonoscopy. Or, have a yearly FIT test (stool test). These exams will check for colon cancer.  Talk to with your health care provider about screening for Abdominal Aortic Aneurysm if you have a family history of AAA or have a history of smoking.    Shots:   Get a flu shot each year.   Get a tetanus shot every 10 years.   Talk to your doctor about your pneumonia vaccines. There are now two you should receive - Pneumovax (PPSV 23) and Prevnar (PCV 13).  Talk to your pharmacist about a shingles vaccine.   Talk to your doctor about the hepatitis B vaccine.    Nutrition:   Eat at least 5 servings of fruits and vegetables each day.   Eat whole-grain bread, whole-wheat  pasta and brown rice instead of white grains and rice.   Get adequate Calcium and Vitamin D.     Lifestyle  Exercise for at least 150 minutes a week (30 minutes a day, 5 days a week). This will help you control your weight and prevent disease.   Limit alcohol to one drink per day.   No smoking.   Wear sunscreen to prevent skin cancer.   See your dentist every six months for an exam and cleaning.   See your eye doctor every 1 to 2 years to screen for conditions such as glaucoma, macular degeneration and cataracts.    Personalized Prevention Plan  You are due for the preventive services outlined below.  Your care team is available to assist you in scheduling these services.  If you have already completed any of these items, please share that information with your care team to update in your medical record.  Health Maintenance   Topic Date Due    INFLUENZA VACCINE (1) 09/01/2022    DTAP/TDAP/TD IMMUNIZATION (3 - Td or Tdap) 09/24/2022    ALT  07/25/2023    BMP  07/25/2023    LIPID  07/25/2023    PSA  07/25/2023    MEDICARE ANNUAL WELLNESS VISIT  08/10/2023    ANNUAL REVIEW OF HM ORDERS  08/10/2023    FALL RISK ASSESSMENT  08/10/2023    ADVANCE CARE PLANNING  08/10/2027    HEPATITIS C SCREENING  Completed    PHQ-2 (once per calendar year)  Completed    Pneumococcal Vaccine: 65+ Years  Completed    ZOSTER IMMUNIZATION  Completed    COVID-19 Vaccine  Completed    IPV IMMUNIZATION  Aged Out    MENINGITIS IMMUNIZATION  Aged Out    HEPATITIS B IMMUNIZATION  Aged Out       Signs of Hearing Loss      Hearing much better with one ear can be a sign of hearing loss.   Hearing loss is a problem shared by many people. In fact, it is one of the most common health problems, particularly as people age. Most people age 65 and older have some hearing loss. By age 80, almost everyone does. Hearing loss often occurs slowly over the years. So you may not realize your hearing has gotten worse.  Have your hearing checked  Call your  healthcare provider if you:  Have to strain to hear normal conversation  Have to watch other people s faces very carefully to follow what they re saying  Need to ask people to repeat what they ve said  Often misunderstand what people are saying  Turn the volume of the television or radio up so high that others complain  Feel that people are mumbling when they re talking to you  Find that the effort to hear leaves you feeling tired and irritated  Notice, when using the phone, that you hear better with one ear than the other  [employer] last reviewed this educational content on 1/1/2020 2000-2021 [employer]. All rights reserved. This information is not intended as a substitute for professional medical care. Always follow your healthcare professional's instructions.          Urinary Incontinence (Male)    Urinary incontinence means not being able to control the release of urine from the bladder.   Causes  Common causes of urinary incontinence in men include:  Infection  Certain medicines  Aging  Poor pelvic muscle tone  Bladder spasms  Obesity  Trouble urinating and fully emptying the bladder (urinary retention)  Other things that can cause incontinence are:   Nervous system diseases  Diabetes  Sleep apnea  Urinary tract infections  Prostate surgery  Pelvic injury  Constipation and smoking have also been identified as risk factors.   Symptoms  Urge incontinence (overactive bladder). This is a sudden urge to urinate. It occurs even though there may not be much urine in the bladder. The need to urinate often during the night is common. It's due to bladder spasms.  Stress incontinence. This is urine leakage that you can't control. It can occur with sneezing, coughing, and other actions that put stress on the bladder.    Treatment  Treatment depends on what is causing the condition. Bladder infections are treated with antibiotics. Urinary retention is treated with a bladder catheter.   Home care  Follow  these guidelines when caring for yourself at home:  Don't have any foods and drinks that may irritate the bladder. This includes:  Chocolate  Alcohol  Caffeine  Carbonated drinks  Acidic fruits and juices  Limit fluids to 6 to 8 cups a day.  Lose weight if you are overweight. This will reduce your symptoms.  If advised, do regular pelvic muscle-strengthening exercises such as Kegel exercises.  If needed, wear absorbent pads to catch urine. Change the pads often. This is for good hygiene and to prevent skin and bladder infections.  Bathe daily for good hygiene.  If an antibiotic was prescribed to treat a bladder infection, take it until it's finished. Keep taking it even if you are feeling better. This is to make sure your infection has cleared.  If a catheter was left in place, keep bacteria from getting into the collection bag. Don't disconnect the catheter from the collection bag.  Use a leg band to secure the catheter drainage tube, so it does not pull on the catheter. Drain the collection bag when it becomes full. To do this, use the drain spout at the bottom of the bag. Don't disconnect the bag from the catheter.  Don't pull on or try to remove a catheter. The catheter must be removed by a healthcare provider.  If you smoke, stop. Ask your provider for help if you can't do this on your own.  Follow-up care  Follow up with your healthcare provider, or as advised.  When to get medical advice  Call your healthcare provider right away if any of these occur:  Fever over 100.4 F (38 C), or as directed by your provider  Bladder pain or fullness  Belly swelling, nausea, or vomiting  Back pain  Weakness, dizziness, or fainting  If a catheter was left in place, return if:  The catheter falls out  The catheter stops draining for 6 hours  Your urine gets cloudy or smells bad  [employer] last reviewed this educational content on 1/1/2020 2000-2021 [employer]. All rights reserved. This information is not  intended as a substitute for professional medical care. Always follow your healthcare professional's instructions.

## 2022-08-24 ENCOUNTER — ALLIED HEALTH/NURSE VISIT (OUTPATIENT)
Dept: FAMILY MEDICINE | Facility: CLINIC | Age: 77
End: 2022-08-24
Payer: COMMERCIAL

## 2022-08-24 DIAGNOSIS — H61.20 IMPACTED EAR WAX: Primary | ICD-10-CM

## 2022-08-24 PROCEDURE — 99207 PR NO CHARGE NURSE ONLY: CPT

## 2022-08-24 NOTE — PROGRESS NOTES
Both patient ears were checked after ear washing.  There was no redness, bleeding or signs of left over wax in ears.  NO pain

## 2022-10-18 ENCOUNTER — IMMUNIZATION (OUTPATIENT)
Dept: FAMILY MEDICINE | Facility: CLINIC | Age: 77
End: 2022-10-18
Payer: COMMERCIAL

## 2022-10-18 DIAGNOSIS — Z23 NEED FOR PROPHYLACTIC VACCINATION AND INOCULATION AGAINST INFLUENZA: ICD-10-CM

## 2022-10-18 DIAGNOSIS — Z23 HIGH PRIORITY FOR 2019-NCOV VACCINE: ICD-10-CM

## 2022-10-18 PROCEDURE — 0124A COVID-19,PF,PFIZER BOOSTER BIVALENT: CPT

## 2022-10-18 PROCEDURE — 91312 COVID-19,PF,PFIZER BOOSTER BIVALENT: CPT

## 2022-10-18 PROCEDURE — 90662 IIV NO PRSV INCREASED AG IM: CPT

## 2022-10-18 PROCEDURE — 99207 PR NO CHARGE NURSE ONLY: CPT

## 2022-10-18 PROCEDURE — G0008 ADMIN INFLUENZA VIRUS VAC: HCPCS | Mod: 59

## 2023-06-18 DIAGNOSIS — E78.5 HYPERLIPIDEMIA LDL GOAL <130: ICD-10-CM

## 2023-06-18 DIAGNOSIS — I10 ESSENTIAL HYPERTENSION, BENIGN: ICD-10-CM

## 2023-06-19 RX ORDER — ATENOLOL 25 MG/1
TABLET ORAL
Qty: 70 TABLET | Refills: 4 | OUTPATIENT
Start: 2023-06-19

## 2023-06-19 RX ORDER — LOVASTATIN 40 MG
TABLET ORAL
Qty: 70 TABLET | Refills: 4 | OUTPATIENT
Start: 2023-06-19

## 2023-06-26 ENCOUNTER — OFFICE VISIT (OUTPATIENT)
Dept: FAMILY MEDICINE | Facility: CLINIC | Age: 78
End: 2023-06-26
Payer: COMMERCIAL

## 2023-06-26 VITALS
OXYGEN SATURATION: 96 % | TEMPERATURE: 97.8 F | SYSTOLIC BLOOD PRESSURE: 134 MMHG | BODY MASS INDEX: 28.07 KG/M2 | HEIGHT: 68 IN | WEIGHT: 185.2 LBS | RESPIRATION RATE: 18 BRPM | DIASTOLIC BLOOD PRESSURE: 72 MMHG | HEART RATE: 66 BPM

## 2023-06-26 DIAGNOSIS — I10 ESSENTIAL HYPERTENSION WITH GOAL BLOOD PRESSURE LESS THAN 140/90: ICD-10-CM

## 2023-06-26 DIAGNOSIS — Z23 HIGH PRIORITY FOR 2019-NCOV VACCINE: ICD-10-CM

## 2023-06-26 DIAGNOSIS — N40.1 BENIGN PROSTATIC HYPERPLASIA WITH INCOMPLETE BLADDER EMPTYING: ICD-10-CM

## 2023-06-26 DIAGNOSIS — H00.025 HORDEOLUM INTERNUM LEFT LOWER EYELID: ICD-10-CM

## 2023-06-26 DIAGNOSIS — R39.14 BENIGN PROSTATIC HYPERPLASIA WITH INCOMPLETE BLADDER EMPTYING: ICD-10-CM

## 2023-06-26 DIAGNOSIS — Z12.5 SCREENING FOR PROSTATE CANCER: ICD-10-CM

## 2023-06-26 DIAGNOSIS — E78.5 HYPERLIPIDEMIA LDL GOAL <130: ICD-10-CM

## 2023-06-26 DIAGNOSIS — Z00.00 ENCOUNTER FOR MEDICARE ANNUAL WELLNESS EXAM: Primary | ICD-10-CM

## 2023-06-26 DIAGNOSIS — Z23 NEED FOR DIPHTHERIA-TETANUS-PERTUSSIS (TDAP) VACCINE: ICD-10-CM

## 2023-06-26 DIAGNOSIS — Z12.11 SCREENING FOR MALIGNANT NEOPLASM OF COLON: ICD-10-CM

## 2023-06-26 LAB
ALT SERPL W P-5'-P-CCNC: 19 U/L (ref 0–70)
ANION GAP SERPL CALCULATED.3IONS-SCNC: 8 MMOL/L (ref 7–15)
BUN SERPL-MCNC: 23.2 MG/DL (ref 8–23)
CALCIUM SERPL-MCNC: 9.4 MG/DL (ref 8.8–10.2)
CHLORIDE SERPL-SCNC: 103 MMOL/L (ref 98–107)
CHOLEST SERPL-MCNC: 158 MG/DL
CREAT SERPL-MCNC: 0.94 MG/DL (ref 0.67–1.17)
DEPRECATED HCO3 PLAS-SCNC: 27 MMOL/L (ref 22–29)
GFR SERPL CREATININE-BSD FRML MDRD: 82 ML/MIN/1.73M2
GLUCOSE SERPL-MCNC: 100 MG/DL (ref 70–99)
HDLC SERPL-MCNC: 48 MG/DL
LDLC SERPL CALC-MCNC: 84 MG/DL
NONHDLC SERPL-MCNC: 110 MG/DL
POTASSIUM SERPL-SCNC: 4.2 MMOL/L (ref 3.4–5.3)
PSA SERPL DL<=0.01 NG/ML-MCNC: 4.37 NG/ML (ref 0–6.5)
SODIUM SERPL-SCNC: 138 MMOL/L (ref 136–145)
TRIGL SERPL-MCNC: 131 MG/DL

## 2023-06-26 PROCEDURE — 80061 LIPID PANEL: CPT | Performed by: FAMILY MEDICINE

## 2023-06-26 PROCEDURE — G0439 PPPS, SUBSEQ VISIT: HCPCS | Performed by: FAMILY MEDICINE

## 2023-06-26 PROCEDURE — G0103 PSA SCREENING: HCPCS | Performed by: FAMILY MEDICINE

## 2023-06-26 PROCEDURE — 91312 COVID-19 BIVALENT 12+ (PFIZER): CPT | Performed by: FAMILY MEDICINE

## 2023-06-26 PROCEDURE — 99214 OFFICE O/P EST MOD 30 MIN: CPT | Mod: 25 | Performed by: FAMILY MEDICINE

## 2023-06-26 PROCEDURE — 80048 BASIC METABOLIC PNL TOTAL CA: CPT | Performed by: FAMILY MEDICINE

## 2023-06-26 PROCEDURE — 0121A COVID-19 BIVALENT 12+ (PFIZER): CPT | Performed by: FAMILY MEDICINE

## 2023-06-26 PROCEDURE — 84460 ALANINE AMINO (ALT) (SGPT): CPT | Performed by: FAMILY MEDICINE

## 2023-06-26 PROCEDURE — 36415 COLL VENOUS BLD VENIPUNCTURE: CPT | Performed by: FAMILY MEDICINE

## 2023-06-26 RX ORDER — ATENOLOL 25 MG/1
25 TABLET ORAL DAILY
Qty: 90 TABLET | Refills: 3 | Status: SHIPPED | OUTPATIENT
Start: 2023-06-26 | End: 2024-06-27

## 2023-06-26 RX ORDER — LOVASTATIN 40 MG
40 TABLET ORAL AT BEDTIME
Qty: 90 TABLET | Refills: 3 | Status: SHIPPED | OUTPATIENT
Start: 2023-06-26 | End: 2024-06-27

## 2023-06-26 RX ORDER — TAMSULOSIN HYDROCHLORIDE 0.4 MG/1
0.4 CAPSULE ORAL DAILY
Qty: 90 CAPSULE | Refills: 3 | Status: SHIPPED | OUTPATIENT
Start: 2023-06-26 | End: 2024-06-27

## 2023-06-26 ASSESSMENT — PAIN SCALES - GENERAL: PAINLEVEL: SEVERE PAIN (6)

## 2023-06-26 ASSESSMENT — ENCOUNTER SYMPTOMS
NERVOUS/ANXIOUS: 0
EYE PAIN: 1

## 2023-06-26 ASSESSMENT — ACTIVITIES OF DAILY LIVING (ADL): CURRENT_FUNCTION: NO ASSISTANCE NEEDED

## 2023-06-26 NOTE — PATIENT INSTRUCTIONS
You will be contacted in 1-2 days for results of your lab tests.     Warm compress to the left eye 10 minutes at a time, few times a day.  Schedule optometry general exam soon.    Colonoscopy  will call you in the next 3-5 business days to set up appointment for the procedure. Referral information is in your visit summary also.  If no call from them after a week or so, contact the care team.    Be consistent with low salt, low trans fat and low saturated fat diet.  Eat food rich in omega-3-fatty acids as you tolerate. (salmon, olive oil)  Eat 5 cups of vegetables, fruits and whole grains per day.  Limit starchy food (white rice, white bread, white pasta, white potatoes) to less than a cup per meal.  Minimize sweets, junk food and fastfood. Limit soda beverages to one serving per day; best to avoid it altogether though.    Exercise: moderate intensity sustained for at least 30 mins per episode, goal of 150 mins per week at least  Combine cardiovascular and resistance exercises.  These exercise recommendations are in addition to your daily activity at work or home.    Check with insurance about tetanus boostser coverage and where they prefer for you to get it from.     Get a flu shot in the fall.    Preventative Care Visits include: Yearly physicals, Well-child visits, Welcome to Medicare visits, & Medicare yearly wellness exams.    The purpose of these visits is to discuss your medical history and prevent health problems before you are sick.  You may need to pay a copay, coinsurance or deductible if your visit today includes testing or treating for a new or existing condition.    Additional charges may be incurred for today's visit. If you have questions about what your insurance plan covers, please contact your Insurance Company's member service department.  If you have questions specific to a bill you have already received from [facility], please contact the [facility] at [phone].       Patient Education   Personalized Prevention Plan  You are due for the preventive services outlined below.  Your care team is available to assist you in scheduling these services.  If you have already completed any of these items, please share that information with your care team to update in your medical record.  Health Maintenance Due   Topic Date Due    Diptheria Tetanus Pertussis (DTAP/TDAP/TD) Vaccine (2 - Td or Tdap) 09/24/2022    COVID-19 Vaccine (6 - Pfizer series) 02/18/2023    Liver Monitoring Lab  07/25/2023    Basic Metabolic Panel  07/25/2023    Cholesterol Lab  07/25/2023    Prostate Test  07/25/2023     Preventive Health Recommendations  See your health care provider every year to  Review health changes.   Discuss preventive care.    Review your medicines if your doctor has prescribed any.  Talk with your health care provider about whether you should have a test to screen for prostate cancer (PSA).  Every 3 years, have a diabetes test (fasting glucose). If you are at risk for diabetes, you should have this test more often.  Every 5 years, have a cholesterol test. Have this test more often if you are at risk for high cholesterol or heart disease.   Every 10 years, have a colonoscopy. Or, have a yearly FIT test (stool test). These exams will check for colon cancer.  Talk to with your health care provider about screening for Abdominal Aortic Aneurysm if you have a family history of AAA or have a history of smoking.    Shots:   Get a flu shot each year.   Get a tetanus shot every 10 years.   Talk to your doctor about your pneumonia vaccines. There are now two you should receive - Pneumovax (PPSV 23) and Prevnar (PCV 13).  Talk to your pharmacist about a shingles vaccine.   Talk to your doctor about the hepatitis B vaccine.    Nutrition:   Eat at least 5 servings of fruits and vegetables each day.   Eat whole-grain bread, whole-wheat pasta and brown rice instead of white grains and rice.   Get adequate  Calcium and Vitamin D.     Lifestyle  Exercise for at least 150 minutes a week (30 minutes a day, 5 days a week). This will help you control your weight and prevent disease.   Limit alcohol to one drink per day.   No smoking.   Wear sunscreen to prevent skin cancer.   See your dentist every six months for an exam and cleaning.   See your eye doctor every 1 to 2 years to screen for conditions such as glaucoma, macular degeneration and cataracts.    Personalized Prevention Plan  You are due for the preventive services outlined below.  Your care team is available to assist you in scheduling these services.  If you have already completed any of these items, please share that information with your care team to update in your medical record.  Health Maintenance   Topic Date Due    DTAP/TDAP/TD IMMUNIZATION (2 - Td or Tdap) 09/24/2022    COVID-19 Vaccine (6 - Pfizer series) 02/18/2023    ALT  07/25/2023    BMP  07/25/2023    LIPID  07/25/2023    PSA  07/25/2023    ANNUAL REVIEW OF HM ORDERS  08/10/2023    MEDICARE ANNUAL WELLNESS VISIT  06/26/2024    FALL RISK ASSESSMENT  06/26/2024    ADVANCE CARE PLANNING  06/26/2028    HEPATITIS C SCREENING  Completed    PHQ-2 (once per calendar year)  Completed    INFLUENZA VACCINE  Completed    Pneumococcal Vaccine: 65+ Years  Completed    ZOSTER IMMUNIZATION  Completed    IPV IMMUNIZATION  Aged Out    MENINGITIS IMMUNIZATION  Aged Out    COLORECTAL CANCER SCREENING  Discontinued       Signs of Hearing Loss  Hearing loss is a problem shared by many people. In fact, it's one of the most common health problems, particularly as people age. Most people aged 65 and older have some hearing loss. By age 80, almost everyone does. Hearing loss often occurs slowly over the years. So, you may not realize your hearing has gotten worse.   When sudden hearing loss occurs, it's important to contact your healthcare provider right away. Your provider will do a medical exam and a hearing exam as soon  as possible. This is to help find the cause and type of your sudden hearing loss. Based on your diagnosis, your healthcare provider will discuss possible treatments.      Hearing much better with one ear can be a sign of hearing loss.     Have your hearing checked  Call your healthcare provider if you:   Have to strain to hear normal conversation  Have to watch other people s faces very carefully to follow what they re saying  Need to ask people to repeat what they ve said  Often misunderstand what people are saying  Turn the volume of the television or radio up so high that others complain  Feel that people are mumbling when they re talking to you  Find that the effort to hear leaves you feeling tired and irritated  Notice, when using the phone, that you hear better with one ear than the other  [employer] last reviewed this educational content on 6/1/2022 2000-2022 [employer]. All rights reserved. This information is not intended as a substitute for professional medical care. Always follow your healthcare professional's instructions.          Urinary Incontinence (Male)  We understand that gender is a spectrum. We may use gendered terms to talk about anatomy and health risk. Please use this sheet in a way that works best for you and your provider as you talk about your care.     Urinary incontinence means not being able to control the release of urine from the bladder.   Causes  Common causes of urinary incontinence in men include:  Infection  Certain medicines  Aging  Poor pelvic muscle tone  Bladder spasms  Obesity  Enlarged prostate  Trouble urinating and fully emptying the bladder (urinary retention)  Other things that can cause incontinence are:   Nervous system diseases  Diabetes  Sleep apnea  Urinary tract infections  Prostate surgery  Pelvic injury  Constipation and smoking have also been identified as risk factors.   Symptoms  Urge incontinence (overactive bladder). This is a sudden urge to  urinate. It occurs even though there may not be much urine in the bladder. The need to urinate often during the night is common. It's due to overactive bladder muscles.  Stress incontinence. This is urine leakage that you can't control. It can occur with sneezing, coughing, and other actions that put stress on the bladder.    Treatment  Treatment depends on what is causing the condition. Bladder infections are treated with antibiotics. Urinary retention is treated with a bladder catheter.   Home care  Follow these guidelines when caring for yourself at home:  Don't have any foods and drinks that may irritate the bladder. This includes:  Chocolate  Alcohol  Caffeine  Carbonated drinks  Acidic fruits and juices  Limit fluids to 6 to 8 cups a day.  Lose weight if you are overweight. This may reduce your symptoms.  If advised, do regular pelvic muscle-strengthening exercises such as Kegel exercises.  If needed, wear absorbent pads to catch urine. Change the pads often. This is for good hygiene and to prevent skin and bladder infections.  Bathe daily for good hygiene.  If an antibiotic was prescribed to treat a bladder infection, take it until it's finished. Keep taking it even if you are feeling better. This is to make sure your infection has cleared.  If a catheter was left in place, keep bacteria from getting into the collection bag. Don't disconnect the catheter from the collection bag.  Use a leg band to secure the catheter drainage tube, so it does not pull on the catheter. Drain the collection bag when it becomes full. To do this, use the drain spout at the bottom of the bag. Don't disconnect the bag from the catheter.  Don't pull on or try to remove a catheter. The catheter must be removed by a healthcare provider.  If you smoke, stop. Ask your provider for help if you can't do this on your own.  Follow-up care  Follow up with your healthcare provider, or as advised.  When to get medical advice  Call your  healthcare provider right away if any of these occur:   Fever over 100.4 F (38 C), or as directed by your provider  Bladder pain or fullness  Belly swelling, nausea, or vomiting  Back pain  Weakness, dizziness, or fainting  If a catheter was left in place, return if:  The catheter falls out  The catheter stops draining for 6 hours  Your urine gets cloudy or smells bad  [employer] last reviewed this educational content on 6/1/2022 2000-2022 [employer]. All rights reserved. This information is not intended as a substitute for professional medical care. Always follow your healthcare professional's instructions.

## 2023-06-26 NOTE — NURSING NOTE
Immunizations Administered     Name Date Dose VIS Date Route    COVID-19 Bivalent 12+ (Pfizer) 6/26/23 10:00 AM 0.3 mL EUA,04/18/2023,Given today Intramuscular        Prior to immunization administration, verified patients identity using patient s name and date of birth. Please see Immunization Activity for additional information.     Screening Questionnaire for Adult Immunization    Are you sick today?   No   Do you have allergies to medications, food, a vaccine component or latex?   No   Have you ever had a serious reaction after receiving a vaccination?   No   Do you have a long-term health problem with heart, lung, kidney, or metabolic disease (e.g., diabetes), asthma, a blood disorder, no spleen, complement component deficiency, a cochlear implant, or a spinal fluid leak?  Are you on long-term aspirin therapy?   No   Do you have cancer, leukemia, HIV/AIDS, or any other immune system problem?   No   Do you have a parent, brother, or sister with an immune system problem?   No   In the past 3 months, have you taken medications that affect  your immune system, such as prednisone, other steroids, or anticancer drugs; drugs for the treatment of rheumatoid arthritis, Crohn s disease, or psoriasis; or have you had radiation treatments?   No   Have you had a seizure, or a brain or other nervous system problem?   No   During the past year, have you received a transfusion of blood or blood    products, or been given immune (gamma) globulin or antiviral drug?   No   For women: Are you pregnant or is there a chance you could become       pregnant during the next month?   No   Have you received any vaccinations in the past 4 weeks?   No     Immunization questionnaire answers were all negative.      Patient instructed to remain in clinic for 15 minutes afterwards, and to report any adverse reactions.     Screening performed by Grecia REYES LPN on 6/26/2023 at 10:05 AM.

## 2023-06-26 NOTE — PROGRESS NOTES
"SUBJECTIVE:   Jung is a 77 year old who presents for Preventive Visit.      6/26/2023     8:47 AM   Additional Questions   Roomed by Grecia REYES LPN   Accompanied by self         6/26/2023     8:47 AM   Patient Reported Additional Medications   Patient reports taking the following new medications No new meds     Are you in the first 12 months of your Medicare coverage?  No    Healthy Habits:     In general, how would you rate your overall health?  Good    Frequency of exercise:  4-5 days/week    Duration of exercise:  15-30 minutes    Do you usually eat at least 4 servings of fruit and vegetables a day, include whole grains    & fiber and avoid regularly eating high fat or \"junk\" foods?  Yes    Taking medications regularly:  Yes    Medication side effects:  None    Ability to successfully perform activities of daily living:  No assistance needed    Home Safety:  No safety concerns identified    Hearing Impairment:  Difficulty following a conversation in a noisy restaurant or crowded room    In the past 6 months, have you been bothered by leaking of urine? Yes    In general, how would you rate your overall mental or emotional health?  Good      PHQ-2 Total Score: 0    Additional concerns today:  No     Eye(s) Problem      Duration: about a week    Description:  Location: left around lower lid  Pain: YES- 6/10 currently and at it's worst  Redness: no  Discharge: nothing constant    Accompanying signs and symptoms: swelling    History (Trauma, foreign body exposure,): has seen ophthalmologist years ago for \"blocked glands\".    Precipitating or alleviating factors (contact use): None    Therapies tried and outcome: None  Last saw optometry 2 yrs ago approx.    Hyperlipidemia Follow-Up      Are you regularly taking any medication or supplement to lower your cholesterol?   Yes- Lovastatin 40mg    Are you having muscle aches or other side effects that you think could be caused by your cholesterol lowering medication?  " No    Hypertension Follow-up      Do you check your blood pressure regularly outside of the clinic? No     Are you following a low salt diet? Yes    Are your blood pressures ever more than 140 on the top number (systolic) OR more   than 90 on the bottom number (diastolic), for example 140/90? Not checking at home    Medication Followup of Atenolol 25mg    Taking Medication as prescribed: yes    Side Effects:  None    Medication Helping Symptoms:  yes    Medication Followup of Lovastatin 40mg    Taking Medication as prescribed: yes    Side Effects:  None    Medication Helping Symptoms:  yes     Medication Followup of Tamsulosin 0.4mg    Taking Medication as prescribed: yes    Side Effects:  None    Medication Helping Symptoms:  yes     Have you ever done Advance Care Planning? (For example, a Health Directive, POLST, or a discussion with a medical provider or your loved ones about your wishes): Yes, advance care planning is on file.      Fall risk  Fallen 2 or more times in the past year?: No  Any fall with injury in the past year?: No    Cognitive Screening   1) Repeat 3 items (Leader, Season, Table)    2) Clock draw: NORMAL  3) 3 item recall: Recalls 3 objects  Results: NORMAL clock, 1-2 items recalled: COGNITIVE IMPAIRMENT LESS LIKELY    Mini-CogTM Copyright [place]. Licensed by the author for use in [facility]; reprinted with permission (wilbur@[facility]). All rights reserved.      Do you have sleep apnea, excessive snoring or daytime drowsiness?: no    Reviewed and updated as needed this visit by clinical staff   Tobacco  Allergies  Meds  Problems           Reviewed and updated as needed this visit by Provider    Allergies  Meds  Problems            Social History     Tobacco Use     Smoking status: Former     Packs/day: 1.00     Years: 7.00     Pack years: 7.00     Types: Cigarettes     Quit date: 1968     Years since quittin.6     Smokeless tobacco: Never   Substance Use Topics      Alcohol use: Yes     Comment: minimal             6/26/2023     8:46 AM   Alcohol Use   Prescreen: >3 drinks/day or >7 drinks/week? No     Do you have a current opioid prescription? No  Do you use any other controlled substances or medications that are not prescribed by a provider? None      Current providers sharing in care for this patient include:   Patient Care Team:  Uvaldo Olivera MD as PCP - General (Family Medicine)  Uvaldo Olivera MD as Assigned PCP  Gia Rolle PA-C as Assigned Surgical Provider    The following health maintenance items are reviewed in Epic and correct as of today:  Health Maintenance   Topic Date Due     DTAP/TDAP/TD IMMUNIZATION (2 - Td or Tdap) 09/24/2022     ALT  07/25/2023     BMP  07/25/2023     LIPID  07/25/2023     PSA  07/25/2023     ANNUAL REVIEW OF HM ORDERS  08/10/2023     MEDICARE ANNUAL WELLNESS VISIT  06/26/2024     FALL RISK ASSESSMENT  06/26/2024     ADVANCE CARE PLANNING  06/26/2028     HEPATITIS C SCREENING  Completed     PHQ-2 (once per calendar year)  Completed     INFLUENZA VACCINE  Completed     Pneumococcal Vaccine: 65+ Years  Completed     ZOSTER IMMUNIZATION  Completed     COVID-19 Vaccine  Completed     IPV IMMUNIZATION  Aged Out     MENINGITIS IMMUNIZATION  Aged Out     COLORECTAL CANCER SCREENING  Discontinued     Patient Active Problem List   Diagnosis     Benign neoplasm of colon     Hyperlipidemia LDL goal <130     Advanced directives, counseling/discussion     Health Care Home     Herpes zoster     Diverticulosis of large intestine     Family history of colon cancer     Family history of malignant neoplasm of breast     Essential hypertension with goal blood pressure less than 140/90     Dermatitis     Chronic vasomotor rhinitis     Family history of rectal cancer     Past Surgical History:   Procedure Laterality Date     COLONOSCOPY  2001    negative      COLONOSCOPY  12/13/2011    Procedure:COLONOSCOPY; Colonoscopy;  Surgeon:DARCIE FISHER; Location:[facility]     COLONOSCOPY N/A 8/10/2016    Procedure: COLONOSCOPY;  Surgeon: Morales Renteria MD;  Location: [facility]     LAPAROSCOPIC HERNIORRHAPHY INGUINAL BILATERAL Bilateral 2019    Procedure: Laparoscopic bilateral inguinal hernia repair and open umbilical hernia repair;  Surgeon: Mckinley Colindres MD;  Location: [facility]       Social History     Tobacco Use     Smoking status: Former     Packs/day: 1.00     Years: 7.00     Pack years: 7.00     Types: Cigarettes     Quit date: 1968     Years since quittin.6     Smokeless tobacco: Never   Substance Use Topics     Alcohol use: Yes     Comment: minimal     Family History   Problem Relation Age of Onset     Hypertension Mother      Cerebrovascular Disease Mother          of a stroke     Cancer - colorectal Mother         age 70s     Prostate Cancer Father         had prostate removed     Diabetes Maternal Grandmother      Cancer Brother         bladder cancer     Colon Cancer Daughter 53        colon cancer surger     Breast Cancer Daughter      Asthma No family hx of      C.A.D. No family hx of      Melanoma No family hx of          Current Outpatient Medications   Medication Sig Dispense Refill     ASPIRIN 81 MG OR TABS 1 TABLET DAILY       atenolol (TENORMIN) 25 MG tablet Take 1 tablet (25 mg) by mouth daily 90 tablet 3     fluticasone (FLONASE) 50 MCG/ACT nasal spray Spray 1-2 sprays into both nostrils daily as needed for rhinitis 51 g 3     lovastatin (MEVACOR) 40 MG tablet Take 1 tablet (40 mg) by mouth At Bedtime 90 tablet 3     tamsulosin (FLOMAX) 0.4 MG capsule Take 1 capsule (0.4 mg) by mouth daily 90 capsule 3     clindamycin (CLEOCIN T) 1 % external lotion Apply twice daily to affected area. (Patient not taking: Reported on 2022) 60 mL 1     triamcinolone (ARISTOCORT HP) 0.5 % external cream Apply topically twice a day as needed to affected area. Call to refill. (Patient not taking: Reported on  "5/27/2022) 30 g 3     Allergies   Allergen Reactions     Nkda [No Known Drug Allergy]      Review of Systems   Eyes: Positive for pain.   Genitourinary: Positive for impotence and urgency.   Psychiatric/Behavioral: The patient is not nervous/anxious.      Constitutional, HEENT, cardiovascular, pulmonary, GI, , musculoskeletal, neuro, skin, endocrine and psych systems are negative, except as otherwise noted.    OBJECTIVE:   /72 (BP Location: Left arm, Patient Position: Sitting, Cuff Size: Adult Regular)   Pulse 66   Temp 97.8  F (36.6  C) (Tympanic)   Resp 18   Ht 1.735 m (5' 8.31\")   Wt 84 kg (185 lb 3.2 oz)   SpO2 96%   BMI 27.91 kg/m   Estimated body mass index is 27.91 kg/m  as calculated from the following:    Height as of this encounter: 1.735 m (5' 8.31\").    Weight as of this encounter: 84 kg (185 lb 3.2 oz).  Physical Exam  GENERAL APPEARANCE: ambulatory w/o assist, overweight, alert and no distress  EYES: left lower eye lid with trace swelling, moderate palpebral conjunctival erythema and one tiny pustule consistent with a stye; right eye with no visible aabnormality;  no icterus, PERRL, EOMI  HENT: ear canals and TM's normal, nose and mouth without ulcers or lesions, oropharynx clear and oral mucous membranes moist  NECK: no adenopathy, no asymmetry, masses, or scars and thyroid normal to palpation  RESP: lungs clear to auscultation - no rales, rhonchi or wheezes  CV: regular rates and rhythm, normal S1 S2, no S3 or S4, no murmur, click or rub, no peripheral edema and peripheral pulses strong  ABDOMEN: soft, nontender, no hepatosplenomegaly, no masses and bowel sounds normal  RECTAL: deferred  MS: no musculoskeletal defects are noted and gait is age appropriate without ataxia  SKIN: no suspicious lesions or rashes  NEURO: Normal strength and tone, sensory exam grossly normal, mentation intact and speech normal    Diagnostic Test Results:  none     ASSESSMENT / PLAN:   Velasquez was seen today " for physical, recheck medication, hypertension, lipids and imm/inj.    Diagnoses and all orders for this visit:    Encounter for Medicare annual wellness exam  -     PRIMARY CARE FOLLOW-UP SCHEDULING; Future  Patient was advised on recommended screening and preventive health recommendations.  He verbalized understanding and agreed to the plans below.    Hyperlipidemia LDL goal <130  -     ALT; Future  -     Lipid panel reflex to direct LDL Non-fasting; Future  -     lovastatin (MEVACOR) 40 MG tablet; Take 1 tablet (40 mg) by mouth At Bedtime  -     OFFICE/OUTPT VISIT,EST,LEVL IV  -     ALT  -     Lipid panel reflex to direct LDL Non-fasting  Reinforced heart healthy lifestyle.    Essential hypertension with goal blood pressure less than 140/90  -     BASIC METABOLIC PANEL; Future  -     atenolol (TENORMIN) 25 MG tablet; Take 1 tablet (25 mg) by mouth daily  -     OFFICE/OUTPT VISIT,EST,LEVL IV  -     BASIC METABOLIC PANEL  Controlled.  Low salt, low fat diet.   Exercise as tolerated.  Take meds as prescribed; call if with side effects.     Benign prostatic hyperplasia with incomplete bladder emptying  -     tamsulosin (FLOMAX) 0.4 MG capsule; Take 1 capsule (0.4 mg) by mouth daily  -     OFFICE/OUTPT VISIT,EST,LEVL IV  Stable per patient.    Hordeolum internum left lower eyelid  -     OFFICE/OUTPT VISIT,EST,LEVL IV  Discussed with patient causes, course and treatment.  Due to lesion being more than 2 weeks, will likely need I&D.  Continue warm compress until seen by optometry.  Precautions given for vision changes, spreading inflammation, fever or discharge formation.    Screening for prostate cancer  -     PROSTATE SPEC ANTIGEN SCREEN; Future  -     PROSTATE SPEC ANTIGEN SCREEN    Need for diphtheria-tetanus-pertussis (Tdap) vaccine    Screening for malignant neoplasm of colon  -     Colonoscopy Screening  Referral; Future    High priority for 2019-nCoV vaccine  -     COVID-19 BIVALENT 12+  "(PFIZER)    Other orders        Patient has been advised of split billing requirements and indicates understanding: Yes      COUNSELING:  Reviewed preventive health counseling, as reflected in patient instructions      BMI:   Estimated body mass index is 27.91 kg/m  as calculated from the following:    Height as of this encounter: 1.735 m (5' 8.31\").    Weight as of this encounter: 84 kg (185 lb 3.2 oz).   Weight management plan: Discussed healthy diet and exercise guidelines      He reports that he quit smoking about 54 years ago. His smoking use included cigarettes. He has a 7.00 pack-year smoking history. He has never used smokeless tobacco.      Appropriate preventive services were discussed with this patient, including applicable screening as appropriate for cardiovascular disease, diabetes, osteopenia/osteoporosis, and glaucoma.  As appropriate for age/gender, discussed screening for colorectal cancer, prostate cancer, breast cancer, and cervical cancer. Checklist reviewing preventive services available has been given to the patient.    Reviewed patients plan of care and provided an AVS. The Basic Care Plan (routine screening as documented in Health Maintenance) and Complex Care Plan (for patients with higher acuity and needing more deliberate coordination of services) for Velasquez meets the Care Plan requirement. This Care Plan has been established and reviewed with the Patient.      Uvaldo Olivera MD  [facility]    Identified Health Risks:    I have reviewed Opioid Use Disorder and Substance Use Disorder risk factors and made any needed referrals.     "

## 2023-08-01 ENCOUNTER — TELEPHONE (OUTPATIENT)
Dept: SURGERY | Facility: CLINIC | Age: 78
End: 2023-08-01
Payer: COMMERCIAL

## 2023-08-01 NOTE — TELEPHONE ENCOUNTER
Screening Questions  BLUE  KIND OF PREP RED  LOCATION [review exclusion criteria] GREEN  SEDATION TYPE        N Are you active on mychart?       SPARKS Ordering/Referring Provider?        [facility] What type of coverage do you have?      N Have you had a positive covid test in the last 14 days?     27.91 1. BMI  [BMI 40+ - review exclusion criteria& smart-phrase document]    Y  2. Are you able to give consent for your medical care? [IF NO,RN REVIEW]          N  3. Are you taking any prescription pain medications on a routine schedule   (ex narcotics: oxycodone, roxicodone, oxycontin,  and percocet)? [RN Review]        N  3a. EXTENDED PREP What kind of prescription?     N 4. Do you have any chemical dependencies such as alcohol, street drugs, or methadone?        **If yes 3- 5 , please schedule with MAC sedation.**          IF YES TO ANY 6 - 10 - HOSPITAL SETTING ONLY.     N 6.   Do you need assistance transferring?     N 7.   Have you had a heart or lung transplant?    N 8.   Are you currently on dialysis?   N 9.   Do you use daily home oxygen?   N 10. Do you take nitroglycerin?   10a. N If yes, how often?     N 11. Are you currently pregnant?    11a. N If yes, how many weeks? [ Greater than 12 weeks, OR NEEDED]    N 12. Do you have Pulmonary Hypertension? *NEED PAC APPT AT [facility] w/ [facility]*     N 13. [review exclusion criteria]  Do you have any implantable devices in your body (pacemaker, defib, LVAD)?    N 14. In the past 6 months, have you had any heart related issues including cardiomyopathy or heart attack?     14a. N If yes, did it require cardiac stenting if so when?     N 15. Have you had a stroke or Transient ischemic attack (TIA - aka  mini stroke ) within 6 months?      N 16. Do you have mod to severe Obstructive Sleep Apnea?  [Hospital only]    N 17. Do you have SEVERE AND UNCONTROLLED asthma? *NEED PAC APPT AT [facility] w/MAC*     18.Do you take blood thinners?  No      N 19. Do you take any of the  "following medications?    N Phentermine     N Wegovy (Semaglutide)      N  21. Do you have a diagnosis of diabetes?      N Ozempic  NTrulicity     *Bydureon (Exertidate ER)    N Mounjaro (Tirzepatdine)    N Rybelsus     19a. If yes, \"Hold for 7 days before procedure.  Please consult your prescribing provider if you have questions about holding this medication.\"     HOLD THE FOLLOWING THE DAY BEFORE AND THE DAY OFF:        N Byetta (Exenatide)    N Saxano-Victoza (Linaglutied)      N  20. Do you have chronic kidney disease?      N  22. On a regular basis do you go 3-5 days between bowel movements?     Y 23. Preferred [facility] for Pre Prescription         [facility] - [place], [place] - [address]        - CLOSING REMINDERS -  You will receive a call from a Nurse to review instructions and health history.  This assessment must be completed prior to your procedure.  Failure to complete the Nurse assessment may result in the procedure being cancelled.    On the day of your procedure, please designatean adult(s) who can drive you home stay with you for the next 24 hours. The medicines used in the exam will make you sleepy. You will not be able to drive.    You cannot take public transportation, ride share services, or non-medical taxi service without a responsible caregiver.  Medical transport services are allowed with the requirement that a responsible caregiver will receive you at your destination.  We require that drivers and caregivers are confirmed prior to your procedure.      - SCHEDULING DETAILS -  N & N Hospital Setting Required & If yes, what is the exclusion?   ROSE  Surgeon    10/5/23  Date of Procedure  Lower Endoscopy [Colonoscopy]  Type of Procedure Scheduled  [facility]- If you answer yes to questions #8, #20, #21 [  pts ]Which Colonoscopy Prep was Sent?     GENERAL Sedation Type     N PAC / Pre-op Required              "

## 2023-09-28 RX ORDER — BISACODYL 5 MG/1
TABLET, DELAYED RELEASE ORAL
Qty: 4 TABLET | Refills: 0 | Status: SHIPPED | OUTPATIENT
Start: 2023-09-28 | End: 2024-07-25

## 2023-10-04 ENCOUNTER — ANESTHESIA EVENT (OUTPATIENT)
Dept: GASTROENTEROLOGY | Facility: CLINIC | Age: 78
End: 2023-10-04
Payer: COMMERCIAL

## 2023-10-04 RX ORDER — ONDANSETRON 2 MG/ML
4 INJECTION INTRAMUSCULAR; INTRAVENOUS EVERY 30 MIN PRN
Status: CANCELLED | OUTPATIENT
Start: 2023-10-04

## 2023-10-04 RX ORDER — ONDANSETRON 4 MG/1
4 TABLET, ORALLY DISINTEGRATING ORAL EVERY 30 MIN PRN
Status: CANCELLED | OUTPATIENT
Start: 2023-10-04

## 2023-10-04 ASSESSMENT — LIFESTYLE VARIABLES: TOBACCO_USE: 1

## 2023-10-04 NOTE — ANESTHESIA PREPROCEDURE EVALUATION
Anesthesia Pre-Procedure Evaluation    Patient: Velasquez Del Rio   MRN: [id number] : 1945        Procedure : Procedure(s):  Colonoscopy          No past medical history on file.   Past Surgical History:   Procedure Laterality Date    COLONOSCOPY      negative     COLONOSCOPY  2011    Procedure:COLONOSCOPY; Colonoscopy; Surgeon:DARCIE FISHER; Location:[facility]    COLONOSCOPY N/A 8/10/2016    Procedure: COLONOSCOPY;  Surgeon: Morales Renteria MD;  Location: [facility]    LAPAROSCOPIC HERNIORRHAPHY INGUINAL BILATERAL Bilateral 2019    Procedure: Laparoscopic bilateral inguinal hernia repair and open umbilical hernia repair;  Surgeon: Mckinley Colindres MD;  Location: [facility]      Allergies   Allergen Reactions    Nkda [No Known Drug Allergy]       Social History     Tobacco Use    Smoking status: Former     Packs/day: 1.00     Years: 7.00     Pack years: 7.00     Types: Cigarettes     Quit date: 1968     Years since quittin.9    Smokeless tobacco: Never   Substance Use Topics    Alcohol use: Yes     Comment: minimal      Wt Readings from Last 1 Encounters:   23 84 kg (185 lb 3.2 oz)        Anesthesia Evaluation   Pt has had prior anesthetic. Type: General.        ROS/MED HX  ENT/Pulmonary:     (+)                tobacco use, Past use,                      Neurologic:       Cardiovascular:     (+) Dyslipidemia hypertension- -   -  - -                                 Previous cardiac testing   Echo: Date: Results:    Stress Test:  Date: Results:    ECG Reviewed:  Date:  Results:  Sinus  Rhythm   -RSR(V1) -nondiagnostic.    -Left atrial enlargement.     BORDERLINE  Cath:  Date: Results:      METS/Exercise Tolerance:     Hematologic:       Musculoskeletal:       GI/Hepatic:       Renal/Genitourinary:       Endo:       Psychiatric/Substance Use:       Infectious Disease:       Malignancy:       Other:            Physical Exam    Airway        Mallampati: II   TM distance: > 3 FB   Neck  ROM: full   Mouth opening: > 3 cm    Respiratory Devices and Support  Comment: Not on oxygen currently       Dental  no notable dental history         Cardiovascular   cardiovascular exam normal          Pulmonary   pulmonary exam normal                OUTSIDE LABS:  CBC:   Lab Results   Component Value Date    WBC 6.3 04/11/2019    HGB 15.3 04/11/2019    HCT 44.9 04/11/2019     04/11/2019     BMP:   Lab Results   Component Value Date     06/26/2023     07/25/2022    POTASSIUM 4.2 06/26/2023    POTASSIUM 4.0 07/25/2022    CHLORIDE 103 06/26/2023    CHLORIDE 106 07/25/2022    CO2 27 06/26/2023    CO2 29 07/25/2022    BUN 23.2 (H) 06/26/2023    BUN 24 07/25/2022    CR 0.94 06/26/2023    CR 0.83 07/25/2022     (H) 06/26/2023    GLC 99 07/25/2022     COAGS: No results found for: PTT, INR, FIBR  POC: No results found for: BGM, HCG, HCGS  HEPATIC:   Lab Results   Component Value Date    ALT 19 06/26/2023    AST 26 11/14/2011     OTHER:   Lab Results   Component Value Date    PRINCE 9.4 06/26/2023    TSH 3.10 05/11/2006       Anesthesia Plan    ASA Status:  3       Anesthesia Type: General.   Induction: Intravenous, Propofol.   Maintenance: TIVA.        Consents    Anesthesia Plan(s) and associated risks, benefits, and realistic alternatives discussed. Questions answered and patient/representative(s) expressed understanding.     - Discussed: Risks, Benefits and Alternatives for BOTH SEDATION and the PROCEDURE were discussed     - Discussed with:  Patient            Postoperative Care    Pain management: IV analgesics, Oral pain medications, Multi-modal analgesia.   PONV prophylaxis: Ondansetron (or other 5HT-3), Dexamethasone or Solumedrol     Comments:    Other Comments: Patient aware of plan, what to expect, and potential risks. Timeout was performed before bringing the patient back to OR, and once again, patient was asked if they had any questions            Benja Ramirez CRNA, DIGNA HAWKINS

## 2023-10-05 ENCOUNTER — HOSPITAL ENCOUNTER (OUTPATIENT)
Facility: CLINIC | Age: 78
Discharge: HOME | End: 2023-10-05
Attending: SURGERY | Admitting: SURGERY
Payer: COMMERCIAL

## 2023-10-05 ENCOUNTER — ANESTHESIA (OUTPATIENT)
Dept: GASTROENTEROLOGY | Facility: CLINIC | Age: 78
End: 2023-10-05
Payer: COMMERCIAL

## 2023-10-05 VITALS
SYSTOLIC BLOOD PRESSURE: 147 MMHG | WEIGHT: 185 LBS | BODY MASS INDEX: 28.04 KG/M2 | DIASTOLIC BLOOD PRESSURE: 86 MMHG | TEMPERATURE: 97.8 F | RESPIRATION RATE: 17 BRPM | OXYGEN SATURATION: 98 % | HEART RATE: 70 BPM | HEIGHT: 68 IN

## 2023-10-05 DIAGNOSIS — Z12.11 SPECIAL SCREENING FOR MALIGNANT NEOPLASMS, COLON: Primary | ICD-10-CM

## 2023-10-05 LAB — COLONOSCOPY: NORMAL

## 2023-10-05 PROCEDURE — 45385 COLONOSCOPY W/LESION REMOVAL: CPT | Mod: PT | Performed by: STUDENT IN AN ORGANIZED HEALTH CARE EDUCATION/TRAINING PROGRAM

## 2023-10-05 PROCEDURE — 45380 COLONOSCOPY AND BIOPSY: CPT | Performed by: STUDENT IN AN ORGANIZED HEALTH CARE EDUCATION/TRAINING PROGRAM

## 2023-10-05 PROCEDURE — 88305 TISSUE EXAM BY PATHOLOGIST: CPT | Mod: 26 | Performed by: PATHOLOGY

## 2023-10-05 PROCEDURE — 250N000011 HC RX IP 250 OP 636

## 2023-10-05 PROCEDURE — 88305 TISSUE EXAM BY PATHOLOGIST: CPT | Mod: TC | Performed by: STUDENT IN AN ORGANIZED HEALTH CARE EDUCATION/TRAINING PROGRAM

## 2023-10-05 PROCEDURE — 258N000003 HC RX IP 258 OP 636: Performed by: SURGERY

## 2023-10-05 PROCEDURE — 370N000017 HC ANESTHESIA TECHNICAL FEE, PER MIN: Performed by: STUDENT IN AN ORGANIZED HEALTH CARE EDUCATION/TRAINING PROGRAM

## 2023-10-05 RX ORDER — NALOXONE HYDROCHLORIDE 0.4 MG/ML
0.4 INJECTION, SOLUTION INTRAMUSCULAR; INTRAVENOUS; SUBCUTANEOUS
Status: CANCELLED | OUTPATIENT
Start: 2023-10-05

## 2023-10-05 RX ORDER — NALOXONE HYDROCHLORIDE 0.4 MG/ML
0.2 INJECTION, SOLUTION INTRAMUSCULAR; INTRAVENOUS; SUBCUTANEOUS
Status: CANCELLED | OUTPATIENT
Start: 2023-10-05

## 2023-10-05 RX ORDER — FLUMAZENIL 0.1 MG/ML
0.2 INJECTION, SOLUTION INTRAVENOUS
Status: CANCELLED | OUTPATIENT
Start: 2023-10-05 | End: 2023-10-06

## 2023-10-05 RX ORDER — PROPOFOL 10 MG/ML
INJECTION, EMULSION INTRAVENOUS PRN
Status: DISCONTINUED | OUTPATIENT
Start: 2023-10-05 | End: 2023-10-05

## 2023-10-05 RX ORDER — LIDOCAINE 40 MG/G
CREAM TOPICAL
Status: DISCONTINUED | OUTPATIENT
Start: 2023-10-05 | End: 2023-10-05 | Stop reason: HOSPADM

## 2023-10-05 RX ORDER — SODIUM CHLORIDE, SODIUM LACTATE, POTASSIUM CHLORIDE, CALCIUM CHLORIDE 600; 310; 30; 20 MG/100ML; MG/100ML; MG/100ML; MG/100ML
INJECTION, SOLUTION INTRAVENOUS CONTINUOUS
Status: DISCONTINUED | OUTPATIENT
Start: 2023-10-05 | End: 2023-10-05 | Stop reason: HOSPADM

## 2023-10-05 RX ADMIN — PROPOFOL 150 MCG/KG/MIN: 10 INJECTION, EMULSION INTRAVENOUS at 16:49

## 2023-10-05 RX ADMIN — SODIUM CHLORIDE, POTASSIUM CHLORIDE, SODIUM LACTATE AND CALCIUM CHLORIDE: 600; 310; 30; 20 INJECTION, SOLUTION INTRAVENOUS at 16:45

## 2023-10-05 RX ADMIN — PROPOFOL 70 MG: 10 INJECTION, EMULSION INTRAVENOUS at 16:46

## 2023-10-05 ASSESSMENT — ACTIVITIES OF DAILY LIVING (ADL)
ADLS_ACUITY_SCORE: 35
ADLS_ACUITY_SCORE: 33

## 2023-10-05 NOTE — LETTER
Velasquez Del Rio  [address]  [facility] [address]    October 16, 2023    Dear Velasquez,  This letter is written to inform you of the results of your recent colonoscopy.  Your examination showed polyp(s) in your transverse colon. All polyps were removed in their entirety and sent for review by a pathologist. As you will see on the pathology report below, the tissue(s) were tubular adenomatous polyps. Your examination was otherwise without abnormality.    Colon, transverse, polypectomies:  --Tubular adenomas.    Adenomatous polyps are entirely benign (non-cancerous); however, patients who have developed these polyps are at an increased risk for developing additional polyps in the future. If these are not eventually removed, there is a risk of developing colon cancer. We will advise more frequent examinations with you because of the risk associated with this type of polyp.    Given these findings, I recommend that you undergo a repeat colonoscopy in 5 year(s) for surveillance. We will enter you into a recall system so you receive a reminder closer to the time that you are due for repeat examination.     Please remember that this recommendation is made with the understanding that you are not experiencing persistent changes in bowel function, bleeding per rectum, and/or significant abdominal pain. If you experience these symptoms, please contact your primary care provider for a further evaluation.     If you have any questions or concerns about the results of your colonoscopy or the appropriate follow-up, please contact my assistant at [phone].    Sincerely,        [facility] Whitehead[facility] FACOS  [facility]

## 2023-10-05 NOTE — ANESTHESIA POSTPROCEDURE EVALUATION
Patient: Velasquez Del Rio    Procedure: Procedure(s):  COLONOSCOPY, WITH POLYPECTOMY AND BIOPSY       Anesthesia Type:  General    Note:  Disposition: Outpatient   Postop Pain Control: Uneventful            Sign Out: Well controlled pain   PONV: No   Neuro/Psych: Uneventful            Sign Out: Acceptable/Baseline neuro status   Airway/Respiratory: Uneventful            Sign Out: Acceptable/Baseline resp. status   CV/Hemodynamics: Uneventful            Sign Out: Acceptable CV status; No obvious hypovolemia; No obvious fluid overload   Other NRE:    DID A NON-ROUTINE EVENT OCCUR?            Last vitals:  Vitals Value Taken Time   BP 98/59 10/05/23 1721   Temp     Pulse 65 10/05/23 1721   Resp     SpO2 96 % 10/05/23 1725   Vitals shown include unvalidated device data.    Electronically Signed By: DIGNA Tobar CRNA  October 5, 2023  5:26 PM

## 2023-10-05 NOTE — ANESTHESIA CARE TRANSFER NOTE
Patient: Velasquez Del Rio    Procedure: Procedure(s):  COLONOSCOPY, WITH POLYPECTOMY AND BIOPSY       Diagnosis: Screening for malignant neoplasm of colon [Z12.11]  Diagnosis Additional Information: No value filed.    Anesthesia Type:   General     Note:    Oropharynx: oropharynx clear of all foreign objects  Level of Consciousness: drowsy      Independent Airway: airway patency satisfactory and stable  Dentition: dentition unchanged  Vital Signs Stable: post-procedure vital signs reviewed and stable  Report to RN Given: handoff report given  Patient transferred to: Phase II    Handoff Report: Identifed the Patient, Identified the Reponsible Provider, Reviewed the pertinent medical history, Discussed the surgical course, Reviewed Intra-OP anesthesia mangement and issues during anesthesia, Set expectations for post-procedure period and Allowed opportunity for questions and acknowledgement of understanding      Vitals:  Vitals Value Taken Time   BP 98/59 10/05/23 1721   Temp     Pulse 65 10/05/23 1721   Resp     SpO2 96 % 10/05/23 1725   Vitals shown include unvalidated device data.    Electronically Signed By: DIGNA Tobar CRNA  October 5, 2023  5:26 PM

## 2023-10-05 NOTE — LETTER
October 9, 2023      Jung Del Rio  [address] [facility] [place] [address]        Dear ,    We are writing to inform you of your test results.      Velasquez Del Rio  [address] [facility] [place] [address]      October 9, 2023    Dear Velasquez,  This letter is written to inform you of the results of your recent colonoscopy.  Your examination showed polyp(s) in your transverse colon. All polyps were removed in their entirety and sent for review by a pathologist. As you will see on the pathology report below, the tissue(s) were tubular adenomatous polyps. Your examination was otherwise without abnormality.    Adenomatous polyps are entirely benign (non-cancerous); however, patients who have developed these polyps are at an increased risk for developing additional polyps in the future. If these are not eventually removed, there is a risk of developing colon cancer. We will advise more frequent examinations with you because of the risk associated with this type of polyp.    Given these findings and your family history of colon cancer, I recommend that you undergo a repeat colonoscopy in 5 year(s) for surveillance. We will enter you into a recall system so you receive a reminder closer to the time that you are due for repeat examination. Your physician also recommends that you adhere to a high fiber diet indefinitely to promote colon health.     Please remember that this recommendation is made with the understanding that you are not experiencing persistent changes in bowel function, bleeding per rectum, and/or significant abdominal pain. If you experience these symptoms, please contact your primary care provider for a further evaluation.     If you have any questions or concerns about the results of your colonoscopy or the appropriate follow-up, please contact my assistant at [phone]    Sincerely,      Emmanuel Kaiser MD   [facility]  ___                    Resulted Orders   Surgical Pathology  "Exam   Result Value Ref Range    Case Report       Surgical Pathology Report                         Case: [id number]                                  Authorizing Provider:  Emmanuel Kaiser MD       Collected:           10/05/2023 05:08 PM          Ordering Location:     [facility]   Received:            10/05/2023 06:47 PM                                 [place]                                                                      Pathologist:           Kris Escobar MD                                                            Specimen:    Large Intestine, Colon, Transverse, x2 polyps                                              Final Diagnosis       Colon, transverse, polypectomies:  --Tubular adenomas.        Clinical Information       Procedure:  COLONOSCOPY, WITH POLYPECTOMY AND BIOPSY  Pre-op Diagnosis: Screening for malignant neoplasm of colon [Z12.11]  Post-op Diagnosis: Z12.11 - Screening for malignant neoplasm of colon [ICD-10-CM]      Gross Description       A(1). Large Intestine, Colon, Transverse, x2 polyps:  The specimen is received in formalin, labeled with the patient's name, medical record number and other identifying information designated \"transverse colon polyp x 2\". It consists of multiple tan soft tissue fragments, less than 0.1-0.4 cm.  Entirely submitted in 3 cassettes.  ([employer])      Microscopic Description       Microscopic examination was performed.        Performing Labs       The technical component of this testing was completed at [facility]      Case Images       "

## 2023-10-05 NOTE — H&P
[facility]    Pre-Endoscopy History and Physical     Velasquez Del Rio MRN# [id number]   YOB: 1945 Age: 78 year old     Date of Procedure: 10/5/2023  Primary care provider: Uvaldo Olivera  Type of Endoscopy: Colonoscopy with possible biopsy, possible polypectomy  Reason for Procedure: Screening, family history of colon cancer  Type of Anesthesia Anticipated: Conscious Sedation    HPI:    Velasquez is a 78 year old male who will be undergoing the above procedure.      A history and physical has been performed. The patient's medications and allergies have been reviewed. The risks and benefits of the procedure and the sedation options and risks were discussed with the patient.  All questions were answered and informed consent was obtained.      He denies a personal or family history of anesthesia complications or bleeding disorders.     Fam hx of colon cancer, last colonoscopy '16. No polyps at that time. Lap inguinal hernia repair. Takes ASA 81 mg    Patient Active Problem List   Diagnosis    Benign neoplasm of colon    Hyperlipidemia LDL goal <130    Advanced directives, counseling/discussion    Health Care Home    Herpes zoster    Diverticulosis of large intestine    Family history of colon cancer    Family history of malignant neoplasm of breast    Essential hypertension with goal blood pressure less than 140/90    Dermatitis    Chronic vasomotor rhinitis    Family history of rectal cancer        History reviewed. No pertinent past medical history.     Past Surgical History:   Procedure Laterality Date    COLONOSCOPY  2001    negative     COLONOSCOPY  12/13/2011    Procedure:COLONOSCOPY; Colonoscopy; Surgeon:DARCIE FISHER; Location:[facility]    COLONOSCOPY N/A 8/10/2016    Procedure: COLONOSCOPY;  Surgeon: Morales Renteria MD;  Location: [facility]    LAPAROSCOPIC HERNIORRHAPHY INGUINAL BILATERAL Bilateral 4/16/2019    Procedure: Laparoscopic bilateral inguinal hernia  repair and open umbilical hernia repair;  Surgeon: Mckinley Colindres MD;  Location: [facility]       Social History     Tobacco Use    Smoking status: Former     Packs/day: 1.00     Years: 7.00     Pack years: 7.00     Types: Cigarettes     Quit date: 1968     Years since quittin.9    Smokeless tobacco: Never   Substance Use Topics    Alcohol use: Yes     Comment: minimal       Family History   Problem Relation Age of Onset    Hypertension Mother     Cerebrovascular Disease Mother          of a stroke    Cancer - colorectal Mother         age 70s    Prostate Cancer Father         had prostate removed    Diabetes Maternal Grandmother     Cancer Brother         bladder cancer    Colon Cancer Daughter 53        colon cancer surger    Breast Cancer Daughter     Asthma No family hx of     C.A.D. No family hx of     Melanoma No family hx of        Prior to Admission medications    Medication Sig Start Date End Date Taking? Authorizing Provider   ASPIRIN 81 MG OR TABS 1 TABLET DAILY   Yes Reported, Patient   atenolol (TENORMIN) 25 MG tablet Take 1 tablet (25 mg) by mouth daily 23  Yes Uvaldo Olivera MD   bisacodyl (DULCOLAX) 5 MG EC tablet Take 2 tablets at 3 pm the day before your procedure. If your procedure is before 11 am, take 2 additional tablets at 11 pm. If your procedure is after 11 am, take 2 additional tablets at 6 am. For additional instructions refer to your colonoscopy prep instructions. 23  Yes Ceasar Whitehead MD   fluticasone (FLONASE) 50 MCG/ACT nasal spray Spray 1-2 sprays into both nostrils daily as needed for rhinitis 8/10/22  Yes Uvaldo Olivera MD   lovastatin (MEVACOR) 40 MG tablet Take 1 tablet (40 mg) by mouth At Bedtime 23  Yes Uvaldo Olivera MD   polyethylene glycol (GOLYTELY) 236 g suspension The night before the exam at 6 pm drink an 8-ounce glass every 15 minutes until the jug is half empty. If you arrive before 11 AM: Drink the other  "half of the Golytely jug at 11 PM night before procedure. If you arrive after 11 AM: Drink the other half of the Golytely jug at 6 AM day of procedure. For additional instructions refer to your colonoscopy prep instructions. 9/28/23  Yes Ceasar Whitehead MD   tamsulosin (FLOMAX) 0.4 MG capsule Take 1 capsule (0.4 mg) by mouth daily 6/26/23  Yes Uvaldo Olivera MD   clindamycin (CLEOCIN T) 1 % external lotion Apply twice daily to affected area.  Patient not taking: Reported on 5/27/2022 4/20/21   Jane Zamarripa PA-C   triamcinolone (ARISTOCORT HP) 0.5 % external cream Apply topically twice a day as needed to affected area. Call to refill.  Patient not taking: Reported on 5/27/2022 8/16/21   Uvaldo Olivera MD       Allergies   Allergen Reactions    Nkda [No Known Drug Allergy]         REVIEW OF SYSTEMS:   5 point ROS negative except as noted above in HPI, including Gen., Resp., CV, GI &  system review.    PHYSICAL EXAM:   BP (!) 159/83   Pulse 96   Temp 97.7  F (36.5  C) (Oral)   Resp 17   Ht 1.735 m (5' 8.31\")   Wt 83.9 kg (185 lb)   SpO2 98%   BMI 27.88 kg/m   Estimated body mass index is 27.88 kg/m  as calculated from the following:    Height as of this encounter: 1.735 m (5' 8.31\").    Weight as of this encounter: 83.9 kg (185 lb).   Constitutional: Awake, alert, no acute distress.  Eyes: No scleral icterus.  Conjunctiva are without injection.  ENMT: Mucous membranes moist, dentition and gums are intact.   Neck: Soft, supple, trachea midline.    Endocrine: n/a   Lymphatic: There is no cervical, submandibularadenopathy.  Respiratory: normal efforts on room air  Cardiovascular: extremities warm and well perfused  Abdomen: Non-distended, non-tender,  No masses,  Musculoskeletal: Full range of motion in the upper and lower extremities.    Skin: No skin rashes or lesions to inspection.  No petechia.    Neurologic: alerted and oriented 3x  Psychiatric: The patient's affect is not " blunted and mood is appropriate.  DIAGNOSTICS:    Not indicated    IMPRESSION   ASA Class 2 - Mild systemic disease    PLAN:   Plan for Colonoscopy with possible biopsy, possible polypectomy. We discussed the risks, benefits and alternatives and the patient wished to proceed.  Patient is cleared for the above procedure.    The above has been forwarded to the consulting provider.    Emmanuel Kaiser MD on 10/5/2023 at 3:52 PM  [facility]

## 2023-10-09 LAB
PATH REPORT.COMMENTS IMP SPEC: NORMAL
PATH REPORT.COMMENTS IMP SPEC: NORMAL
PATH REPORT.FINAL DX SPEC: NORMAL
PATH REPORT.GROSS SPEC: NORMAL
PATH REPORT.MICROSCOPIC SPEC OTHER STN: NORMAL
PATH REPORT.RELEVANT HX SPEC: NORMAL
PHOTO IMAGE: NORMAL

## 2023-10-12 ENCOUNTER — IMMUNIZATION (OUTPATIENT)
Dept: FAMILY MEDICINE | Facility: CLINIC | Age: 78
End: 2023-10-12
Payer: COMMERCIAL

## 2023-10-12 PROCEDURE — 91320 SARSCV2 VAC 30MCG TRS-SUC IM: CPT

## 2023-10-12 PROCEDURE — 90480 ADMN SARSCOV2 VAC 1/ONLY CMP: CPT

## 2024-03-18 DIAGNOSIS — I10 ESSENTIAL HYPERTENSION WITH GOAL BLOOD PRESSURE LESS THAN 140/90: ICD-10-CM

## 2024-03-18 DIAGNOSIS — R39.14 BENIGN PROSTATIC HYPERPLASIA WITH INCOMPLETE BLADDER EMPTYING: ICD-10-CM

## 2024-03-18 DIAGNOSIS — N40.1 BENIGN PROSTATIC HYPERPLASIA WITH INCOMPLETE BLADDER EMPTYING: ICD-10-CM

## 2024-03-18 DIAGNOSIS — E78.5 HYPERLIPIDEMIA LDL GOAL <130: ICD-10-CM

## 2024-03-18 RX ORDER — ATENOLOL 25 MG/1
25 TABLET ORAL DAILY
Qty: 100 TABLET | Refills: 2 | OUTPATIENT
Start: 2024-03-18

## 2024-03-18 RX ORDER — LOVASTATIN 40 MG
40 TABLET ORAL AT BEDTIME
Qty: 100 TABLET | Refills: 2 | OUTPATIENT
Start: 2024-03-18

## 2024-03-18 RX ORDER — TAMSULOSIN HYDROCHLORIDE 0.4 MG/1
0.4 CAPSULE ORAL DAILY
Qty: 100 CAPSULE | Refills: 2 | OUTPATIENT
Start: 2024-03-18

## 2024-06-05 ENCOUNTER — TELEPHONE (OUTPATIENT)
Dept: FAMILY MEDICINE | Facility: CLINIC | Age: 79
End: 2024-06-05
Payer: COMMERCIAL

## 2024-06-05 NOTE — TELEPHONE ENCOUNTER
Attempted to reach Velasquez RAMSES Del Rio in regards to their lovastatin being overdue for refill. Left voicemail, with call back number, requesting return call. Per our records last filled 1/9/24 for 90 day supply and is 48 days late to refill.      Haydee Young, PharmD, [facility]  Population Health Pharmacist  [phone]

## 2024-06-27 ENCOUNTER — OFFICE VISIT (OUTPATIENT)
Dept: FAMILY MEDICINE | Facility: CLINIC | Age: 79
End: 2024-06-27
Payer: COMMERCIAL

## 2024-06-27 VITALS
TEMPERATURE: 97.4 F | HEIGHT: 67 IN | DIASTOLIC BLOOD PRESSURE: 80 MMHG | RESPIRATION RATE: 20 BRPM | BODY MASS INDEX: 28.88 KG/M2 | SYSTOLIC BLOOD PRESSURE: 136 MMHG | OXYGEN SATURATION: 98 % | WEIGHT: 184 LBS | HEART RATE: 65 BPM

## 2024-06-27 DIAGNOSIS — Z12.5 SCREENING FOR PROSTATE CANCER: ICD-10-CM

## 2024-06-27 DIAGNOSIS — Z00.00 ENCOUNTER FOR MEDICARE ANNUAL WELLNESS EXAM: Primary | ICD-10-CM

## 2024-06-27 DIAGNOSIS — E78.5 HYPERLIPIDEMIA LDL GOAL <130: ICD-10-CM
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
## 2024-06-27 DIAGNOSIS — R39.14 BENIGN PROSTATIC HYPERPLASIA WITH INCOMPLETE BLADDER EMPTYING: ICD-10-CM

## 2024-06-27 DIAGNOSIS — N40.1 BENIGN PROSTATIC HYPERPLASIA WITH INCOMPLETE BLADDER EMPTYING: ICD-10-CM

## 2024-06-27 DIAGNOSIS — K13.70 LESION OF BUCCAL MUCOSA: ICD-10-CM

## 2024-06-27 DIAGNOSIS — J30.0 CHRONIC VASOMOTOR RHINITIS: ICD-10-CM

## 2024-06-27 DIAGNOSIS — I10 ESSENTIAL HYPERTENSION WITH GOAL BLOOD PRESSURE LESS THAN 140/90: ICD-10-CM

## 2024-06-27 LAB
ALT SERPL W P-5'-P-CCNC: 18 U/L (ref 0–70)
ANION GAP SERPL CALCULATED.3IONS-SCNC: 11 MMOL/L (ref 7–15)
BUN SERPL-MCNC: 22.5 MG/DL (ref 8–23)
CALCIUM SERPL-MCNC: 9.3 MG/DL (ref 8.8–10.2)
CHLORIDE SERPL-SCNC: 103 MMOL/L (ref 98–107)
CHOLEST SERPL-MCNC: 175 MG/DL
CREAT SERPL-MCNC: 0.91 MG/DL (ref 0.67–1.17)
DEPRECATED HCO3 PLAS-SCNC: 26 MMOL/L (ref 22–29)
EGFRCR SERPLBLD CKD-EPI 2021: 85 ML/MIN/1.73M2
FASTING STATUS PATIENT QL REPORTED: YES
FASTING STATUS PATIENT QL REPORTED: YES
GLUCOSE SERPL-MCNC: 104 MG/DL (ref 70–99)
HDLC SERPL-MCNC: 50 MG/DL
LDLC SERPL CALC-MCNC: 104 MG/DL
NONHDLC SERPL-MCNC: 125 MG/DL
POTASSIUM SERPL-SCNC: 4.8 MMOL/L (ref 3.4–5.3)
PSA SERPL DL<=0.01 NG/ML-MCNC: 4.81 NG/ML
SODIUM SERPL-SCNC: 140 MMOL/L (ref 135–145)
TRIGL SERPL-MCNC: 103 MG/DL

## 2024-06-27 PROCEDURE — 84460 ALANINE AMINO (ALT) (SGPT): CPT | Performed by: FAMILY MEDICINE

## 2024-06-27 PROCEDURE — G0103 PSA SCREENING: HCPCS | Performed by: FAMILY MEDICINE

## 2024-06-27 PROCEDURE — 36415 COLL VENOUS BLD VENIPUNCTURE: CPT | Performed by: FAMILY MEDICINE

## 2024-06-27 PROCEDURE — 80061 LIPID PANEL: CPT | Performed by: FAMILY MEDICINE

## 2024-06-27 PROCEDURE — G0439 PPPS, SUBSEQ VISIT: HCPCS | Performed by: FAMILY MEDICINE

## 2024-06-27 PROCEDURE — 99214 OFFICE O/P EST MOD 30 MIN: CPT | Mod: 25 | Performed by: FAMILY MEDICINE

## 2024-06-27 PROCEDURE — 80048 BASIC METABOLIC PNL TOTAL CA: CPT | Performed by: FAMILY MEDICINE

## 2024-06-27 RX ORDER — LOVASTATIN 40 MG
40 TABLET ORAL AT BEDTIME
Qty: 90 TABLET | Refills: 3 | Status: SHIPPED | OUTPATIENT
Start: 2024-06-27

## 2024-06-27 RX ORDER — FLUTICASONE PROPIONATE 50 MCG
1-2 SPRAY, SUSPENSION (ML) NASAL DAILY PRN
Qty: 51 G | Refills: 3 | Status: SHIPPED | OUTPATIENT
Start: 2024-06-27

## 2024-06-27 RX ORDER — TAMSULOSIN HYDROCHLORIDE 0.4 MG/1
0.4 CAPSULE ORAL DAILY
Qty: 90 CAPSULE | Refills: 3 | Status: SHIPPED | OUTPATIENT
Start: 2024-06-27

## 2024-06-27 RX ORDER — ATENOLOL 25 MG/1
25 TABLET ORAL DAILY
Qty: 90 TABLET | Refills: 3 | Status: SHIPPED | OUTPATIENT
Start: 2024-06-27

## 2024-06-27 SDOH — HEALTH STABILITY: PHYSICAL HEALTH: ON AVERAGE, HOW MANY MINUTES DO YOU ENGAGE IN EXERCISE AT THIS LEVEL?: 20 MIN

## 2024-06-27 SDOH — HEALTH STABILITY: PHYSICAL HEALTH: ON AVERAGE, HOW MANY DAYS PER WEEK DO YOU ENGAGE IN MODERATE TO STRENUOUS EXERCISE (LIKE A BRISK WALK)?: 5 DAYS

## 2024-06-27 ASSESSMENT — SOCIAL DETERMINANTS OF HEALTH (SDOH): HOW OFTEN DO YOU GET TOGETHER WITH FRIENDS OR RELATIVES?: ONCE A WEEK

## 2024-06-27 ASSESSMENT — PAIN SCALES - GENERAL: PAINLEVEL: NO PAIN (0)

## 2024-06-27 NOTE — LETTER
June 27, 2024      Jung Del Rio  [address] [facility] [address]        Dear ,    We are writing to inform you of your test results.    Your test results fall within the expected range(s) or remain unchanged from previous results.  Please continue with current treatment plan.    Please send letter with results.     Fasting blood sugar is slightly high.   Bad cholesterol level is borderline above desirable level.   Liver enzyme and PSA levels are in normal range.     Continue all medications unchanged.   Be consistent with low salt, low trans fat and low saturated fat diet.   Eat food rich in omega-3-fatty acids as you tolerate. (salmon, olive oil)   Eat 5 cups of vegetables, fruits and whole grains per day.   Limit starchy food (white rice, white bread, white pasta, white potatoes) to less than a cup per meal.   Minimize sweets, junk food and fastfood. Limit soda beverages to one serving per day; best to avoid it altogether though.     Exercise: moderate intensity sustained for at least 30 mins per episode, goal of 150 mins per week at least   Combine cardiovascular and resistance exercises.   These exercise recommendations are in addition to your daily activity at work or home.     Resulted Orders   ALT   Result Value Ref Range    ALT 18 0 - 70 U/L   BASIC METABOLIC PANEL   Result Value Ref Range    Sodium 140 135 - 145 mmol/L    Potassium 4.8 3.4 - 5.3 mmol/L    Chloride 103 98 - 107 mmol/L    Carbon Dioxide (CO2) 26 22 - 29 mmol/L    Anion Gap 11 7 - 15 mmol/L    Urea Nitrogen 22.5 8.0 - 23.0 mg/dL    Creatinine 0.91 0.67 - 1.17 mg/dL    GFR Estimate 85 >60 mL/min/1.73m2      Comment:      eGFR calculated using 2021 CKD-EPI equation.    Calcium 9.3 8.8 - 10.2 mg/dL    Glucose 104 (H) 70 - 99 mg/dL    Patient Fasting > 8hrs? Yes    Lipid panel reflex to direct LDL Fasting   Result Value Ref Range    Cholesterol 175 <200 mg/dL    Triglycerides 103 <150 mg/dL    Direct Measure HDL 50 >=40 mg/dL    LDL  Cholesterol Calculated 104 (H) <=100 mg/dL    Non HDL Cholesterol 125 <130 mg/dL    Patient Fasting > 8hrs? Yes     Narrative    Cholesterol  Desirable:  <200 mg/dL    Triglycerides  Normal:  Less than 150 mg/dL  Borderline High:  150-199 mg/dL  High:  200-499 mg/dL  Very High:  Greater than or equal to 500 mg/dL    Direct Measure HDL  Female:  Greater than or equal to 50 mg/dL   Male:  Greater than or equal to 40 mg/dL    LDL Cholesterol  Desirable:  <100mg/dL  Above Desirable:  100-129 mg/dL   Borderline High:  130-159 mg/dL   High:  160-189 mg/dL   Very High:  >= 190 mg/dL    Non HDL Cholesterol  Desirable:  130 mg/dL  Above Desirable:  130-159 mg/dL  Borderline High:  160-189 mg/dL  High:  190-219 mg/dL  Very High:  Greater than or equal to 220 mg/dL   PROSTATE SPEC ANTIGEN SCREEN   Result Value Ref Range    Prostate Specific Antigen Screen 4.81 ng/mL      Comment:      No reference ranges have been established for patients over 80 years.    Narrative    This result is obtained using the Roche Elecsys total PSA method on the anurag e601 immunoassay analyzer. Results obtained with different assay methods or kits cannot be used interchangeably.       If you have any questions or concerns, please call the clinic at the number listed above.       Sincerely,      Uvaldo Olivera MD

## 2024-06-27 NOTE — PATIENT INSTRUCTIONS
Patient Education     Medications have been refilled.    Be consistent with low salt, low trans fat and low saturated fat diet.  Eat food rich in omega-3-fatty acids as you tolerate. (salmon, olive oil)  Eat 5 cups of vegetables, fruits and whole grains per day.  Limit starchy food (white rice, white bread, white pasta, white potatoes) to less than a cup per meal.  Minimize sweets, junk food and fastfood. Limit soda beverages to one serving per day; best to avoid it altogether though.    Exercise: moderate intensity sustained for at least 30 mins per episode, goal of 150 mins per week at least  Combine cardiovascular and resistance exercises.  These exercise recommendations are in addition to your daily activity at work or home.  Work on losing weight.    You will be contacted in 1-2 days for results of your lab tests.     Referral to ENT has been signed. Schedulers will call you in the next 3-5 business days.   If the lesion inside your cheek does resolve before that appointment, you may cancel the consult.    No sign today to suspect progressive internal head bleeding 3 days after your fall.  No clear indication for CT scan of the head.    You deferred updated covid19 vaccine by end of this summer together with your flu shot, and RSV vaccine if that is recommended for this year.    Preventative Care Visits include: Yearly physicals, Well-child visits, Welcome to Medicare visits, & Medicare yearly wellness exams.    The purpose of these visits is to discuss your medical history and prevent health problems before you are sick.  You may need to pay a copay, coinsurance or deductible if your visit today includes testing or treating for a new or existing condition.    Additional charges may be incurred for today's visit. If you have questions about what your insurance plan covers, please contact your Insurance Company's member service department.  If you have questions specific to a bill you have already received from  "[facility], please contact the [facility] at [phone].      Preventive Care Advice   This is general advice we often give to help people stay healthy. Your care team may have specific advice just for you. Please talk to your care team about your own preventive care needs.  Lifestyle  Exercise at least 150 minutes each week (30 minutes a day, 5 days a week).  Do muscle strengthening activities 2 days a week. These help control your weight and prevent disease.  No smoking.  Wear sunscreen to prevent skin cancer.  Have your home tested for radon every 2 to 5 years. Radon is a colorless, odorless gas that can harm your lungs. To learn more, go to www.health.[facility].[place] and search for \"Radon in Homes.\"  Keep guns unloaded and locked up in a safe place like a safe or gun vault, or, use a gun lock and hide the keys. Always lock away bullets separately. To learn more, visit [employer].[place].gov and search for \"safe gun storage.\"  Nutrition  Eat 5 or more servings of fruits and vegetables each day.  Try wheat bread, brown rice and whole grain pasta (instead of white bread, rice, and pasta).  Get enough calcium and vitamin D. Check the label on foods and aim for 100% of the RDA (recommended daily allowance).  Regular exams  Have a dental exam and cleaning every 6 months.  See your health care team every year to talk about:  Any changes in your health.  Any medicines your care team has prescribed.  Preventive care, family planning, and ways to prevent chronic diseases.  Shots (vaccines)   HPV shots (up to age 26), if you've never had them before.  Hepatitis B shots (up to age 59), if you've never had them before.  COVID-19 shot: Get this shot when it's due.  Flu shot: Get a flu shot every year.  Tetanus shot: Get a tetanus shot every 10 years.  Pneumococcal, hepatitis A, and RSV shots: Ask your care team if you need these based on your risk.  Shingles shot (for age 50 and up).  General health tests  Diabetes " screening:  Starting at age 35, Get screened for diabetes at least every 3 years.  If you are younger than age 35, ask your care team if you should be screened for diabetes.  Cholesterol test: At age 39, start having a cholesterol test every 5 years, or more often if advised.  Bone density scan (DEXA): At age 50, ask your care team if you should have this scan for osteoporosis (brittle bones).  Hepatitis C: Get tested at least once in your life.  Abdominal aortic aneurysm screening: Talk to your doctor about having this screening if you:  Have ever smoked; and  Are biologically male; and  Are between the ages of 65 and 75.  STIs (sexually transmitted infections)  Before age 24: Ask your care team if you should be screened for STIs.  After age 24: Get screened for STIs if you're at risk. You are at risk for STIs (including HIV) if:  You are sexually active with more than one person.  You don't use condoms every time.  You or a partner was diagnosed with a sexually transmitted infection.  If you are at risk for HIV, ask about PrEP medicine to prevent HIV.  Get tested for HIV at least once in your life, whether you are at risk for HIV or not.  Cancer screening tests  Cervical cancer screening: If you have a cervix, begin getting regular cervical cancer screening tests at age 21. Most people who have regular screenings with normal results can stop after age 65. Talk about this with your provider.  Breast cancer scan (mammogram): If you've ever had breasts, begin having regular mammograms starting at age 40. This is a scan to check for breast cancer.  Colon cancer screening: It is important to start screening for colon cancer at age 45.  Have a colonoscopy test every 10 years (or more often if you're at risk) Or, ask your provider about stool tests like a FIT test every year or Cologuard test every 3 years.  To learn more about your testing options, visit: www.[employer]/244147.pdf.  For help making a decision, visit:  [email or web address].  Prostate cancer screening test: If you have a prostate and are age 55 to 69, ask your provider if you would benefit from a yearly prostate cancer screening test.  Lung cancer screening: If you are a current or former smoker age 50 to 80, ask your care team if ongoing lung cancer screenings are right for you.  For informational purposes only. Not to replace the advice of your health care provider. Copyright   2023 [facility]. All rights reserved. Clinically reviewed by the [facility] Transitions Program. [employer] 200233 - REV 04/24.  Bladder Training: Care Instructions  Your Care Instructions     Bladder training is used to treat urge incontinence and stress incontinence. Urge incontinence means that the need to urinate comes on so fast that you can't get to a toilet in time. Stress incontinence means that you leak urine because of pressure on your bladder. For example, it may happen when you laugh, cough, or lift something heavy.  Bladder training can increase how long you can wait before you have to urinate. It can also help your bladder hold more urine. And it can give you better control over the urge to urinate.  It is important to remember that bladder training takes a few weeks to a few months to make a difference. You may not see results right away, but don't give up.  Follow-up care is a key part of your treatment and safety. Be sure to make and go to all appointments, and call your doctor if you are having problems. It's also a good idea to know your test results and keep a list of the medicines you take.  How can you care for yourself at home?  Work with your doctor to come up with a bladder training program that is right for you. You may use one or more of the following methods.  Delayed urination  In the beginning, try to keep from urinating for 5 minutes after you first feel the need to go.  While you wait, take deep, slow breaths to relax. Kegel exercises can also  "help you delay the need to go to the bathroom.  After some practice, when you can easily wait 5 minutes to urinate, try to wait 10 minutes before you urinate.  Slowly increase the waiting period until you are able to control when you have to urinate.  Scheduled urination  Empty your bladder when you first wake up in the morning.  Schedule times throughout the day when you will urinate.  Start by going to the bathroom every hour, even if you don't need to go.  Slowly increase the time between trips to the bathroom.  When you have found a schedule that works well for you, keep doing it.  If you wake up during the night and have to urinate, do it. Apply your schedule to waking hours only.  Kegel exercises  These tighten and strengthen pelvic muscles, which can help you control the flow of urine. (If doing these exercises causes pain, stop doing them and talk with your doctor.) To do Kegel exercises:  Squeeze your muscles as if you were trying not to pass gas. Or squeeze your muscles as if you were stopping the flow of urine. Your belly, legs, and buttocks shouldn't move.  Hold the squeeze for 3 seconds, then relax for 5 to 10 seconds.  Start with 3 seconds, then add 1 second each week until you are able to squeeze for 10 seconds.  Repeat the exercise 10 times a session. Do 3 to 8 sessions a day.  When should you call for help?  Watch closely for changes in your health, and be sure to contact your doctor if:    Your incontinence is getting worse.     You do not get better as expected.   Where can you learn more?  Go to https://www.[employer].net/patiented  Enter V684 in the search box to learn more about \"Bladder Training: Care Instructions.\"  Current as of: November 15, 2023               Content Version: 14.0    7355-7048 [employer].   Care instructions adapted under license by your healthcare professional. If you have questions about a medical condition or this instruction, always ask your healthcare " professional. [employer] disclaims any warranty or liability for your use of this information.

## 2024-06-27 NOTE — PROGRESS NOTES
Preventive Care Visit  [facility]  Uvaldo Olivera MD, Family Medicine  Jun 27, 2024      Assessment & Plan     Encounter for Medicare annual wellness exam  Patient was advised on recommended screening and preventive health recommendations.  He verbalized understanding and agreed to the plans below.   He preferred to get covid19 vaccine later this summer.    Benign prostatic hyperplasia with incomplete bladder emptying  Stable per patient. Tolerating med well.  - tamsulosin (FLOMAX) 0.4 MG capsule  Dispense: 90 capsule; Refill: 3  - OFFICE/OUTPT VISIT,EST,LEVL IV    Hyperlipidemia LDL goal <130  Reinforced heart healthy lifestyle.   - ALT  - Lipid panel reflex to direct LDL Fasting  - lovastatin (MEVACOR) 40 MG tablet  Dispense: 90 tablet; Refill: 3  - OFFICE/OUTPT VISIT,EST,LEVL IV    Essential hypertension with goal blood pressure less than 140/90  Controlled.  Low salt, low fat diet.   Exercise as tolerated.  Take meds as prescribed; call if with side effects.    - BASIC METABOLIC PANEL  - atenolol (TENORMIN) 25 MG tablet  Dispense: 90 tablet; Refill: 3  - OFFICE/OUTPT VISIT,EST,LEVL IV    Chronic vasomotor rhinitis  Stable per patient.  - fluticasone (FLONASE) 50 MCG/ACT nasal spray  Dispense: 51 g; Refill: 3  - OFFICE/OUTPT VISIT,EST,LEVL IV    Accidental fall, initial encounter  No abnormal neuro signs today. Advised very low to no suspicion for IC bleed.  No clear indication for CT scan. Risks of radiation outweighs benefit at this time. Patient agrees with no imaging for now.  Return precautions discussed and given to patient.   - OFFICE/OUTPT VISIT,ESTLEVL IV    Lesion of buccal mucosa  Appearance less likely to be malignant lesion. Likely result of biting his cheek 2 months ago.  Referred to ENT for if the lesion persists or changes in character for the next 1-2 months or so.  - Adult ENT  Referral  - OFFICE/OUTPT VISIT,ESTLEVL IV    Screening for prostate  "cancer  - PROSTATE SPEC ANTIGEN SCREEN      Patient has been advised of split billing requirements and indicates understanding: Yes        BMI  Estimated body mass index is 28.82 kg/m  as calculated from the following:    Height as of this encounter: 1.702 m (5' 7\").    Weight as of this encounter: 83.5 kg (184 lb).   Weight management plan: Discussed healthy diet and exercise guidelines    Counseling  Appropriate preventive services were discussed with this patient, including applicable screening as appropriate for fall prevention, nutrition, physical activity, Tobacco-use cessation, weight loss and cognition.  Checklist reviewing preventive services available has been given to the patient.  Reviewed patient's diet, addressing concerns and/or questions.   Information on urinary incontinence and treatment options given to patient.       Patient Instructions   Patient Education    Medications have been refilled.    Be consistent with low salt, low trans fat and low saturated fat diet.  Eat food rich in omega-3-fatty acids as you tolerate. (salmon, olive oil)  Eat 5 cups of vegetables, fruits and whole grains per day.  Limit starchy food (white rice, white bread, white pasta, white potatoes) to less than a cup per meal.  Minimize sweets, junk food and fastfood. Limit soda beverages to one serving per day; best to avoid it altogether though.    Exercise: moderate intensity sustained for at least 30 mins per episode, goal of 150 mins per week at least  Combine cardiovascular and resistance exercises.  These exercise recommendations are in addition to your daily activity at work or home.  Work on losing weight.    You will be contacted in 1-2 days for results of your lab tests.     Referral to ENT has been signed. Schedulers will call you in the next 3-5 business days.   If the lesion inside your cheek does resolve before that appointment, you may cancel the consult.    No sign today to suspect progressive internal head " "bleeding 3 days after your fall.  No clear indication for CT scan of the head.    You deferred updated covid19 vaccine by end of this summer together with your flu shot, and RSV vaccine if that is recommended for this year.    Preventative Care Visits include: Yearly physicals, Well-child visits, Welcome to Medicare visits, & Medicare yearly wellness exams.    The purpose of these visits is to discuss your medical history and prevent health problems before you are sick.  You may need to pay a copay, coinsurance or deductible if your visit today includes testing or treating for a new or existing condition.    Additional charges may be incurred for today's visit. If you have questions about what your insurance plan covers, please contact your Insurance Company's member service department.  If you have questions specific to a bill you have already received from [employer], please contact the [employer] Billing Office at [phone].      Preventive Care Advice   This is general advice we often give to help people stay healthy. Your care team may have specific advice just for you. Please talk to your care team about your own preventive care needs.  Lifestyle  Exercise at least 150 minutes each week (30 minutes a day, 5 days a week).  Do muscle strengthening activities 2 days a week. These help control your weight and prevent disease.  No smoking.  Wear sunscreen to prevent skin cancer.  Have your home tested for radon every 2 to 5 years. Radon is a colorless, odorless gas that can harm your lungs. To learn more, go to www.health.[facility]. and search for \"Radon in Homes.\"  Keep guns unloaded and locked up in a safe place like a safe or gun vault, or, use a gun lock and hide the keys. Always lock away bullets separately. To learn more, visit [employer].[place].gov and search for \"safe gun storage.\"  Nutrition  Eat 5 or more servings of fruits and vegetables each day.  Try wheat bread, brown rice and whole grain pasta (instead of " white bread, rice, and pasta).  Get enough calcium and vitamin D. Check the label on foods and aim for 100% of the RDA (recommended daily allowance).  Regular exams  Have a dental exam and cleaning every 6 months.  See your health care team every year to talk about:  Any changes in your health.  Any medicines your care team has prescribed.  Preventive care, family planning, and ways to prevent chronic diseases.  Shots (vaccines)   HPV shots (up to age 26), if you've never had them before.  Hepatitis B shots (up to age 59), if you've never had them before.  COVID-19 shot: Get this shot when it's due.  Flu shot: Get a flu shot every year.  Tetanus shot: Get a tetanus shot every 10 years.  Pneumococcal, hepatitis A, and RSV shots: Ask your care team if you need these based on your risk.  Shingles shot (for age 50 and up).  General health tests  Diabetes screening:  Starting at age 35, Get screened for diabetes at least every 3 years.  If you are younger than age 35, ask your care team if you should be screened for diabetes.  Cholesterol test: At age 39, start having a cholesterol test every 5 years, or more often if advised.  Bone density scan (DEXA): At age 50, ask your care team if you should have this scan for osteoporosis (brittle bones).  Hepatitis C: Get tested at least once in your life.  Abdominal aortic aneurysm screening: Talk to your doctor about having this screening if you:  Have ever smoked; and  Are biologically male; and  Are between the ages of 65 and 75.  STIs (sexually transmitted infections)  Before age 24: Ask your care team if you should be screened for STIs.  After age 24: Get screened for STIs if you're at risk. You are at risk for STIs (including HIV) if:  You are sexually active with more than one person.  You don't use condoms every time.  You or a partner was diagnosed with a sexually transmitted infection.  If you are at risk for HIV, ask about PrEP medicine to prevent HIV.  Get tested for  HIV at least once in your life, whether you are at risk for HIV or not.  Cancer screening tests  Cervical cancer screening: If you have a cervix, begin getting regular cervical cancer screening tests at age 21. Most people who have regular screenings with normal results can stop after age 65. Talk about this with your provider.  Breast cancer scan (mammogram): If you've ever had breasts, begin having regular mammograms starting at age 40. This is a scan to check for breast cancer.  Colon cancer screening: It is important to start screening for colon cancer at age 45.  Have a colonoscopy test every 10 years (or more often if you're at risk) Or, ask your provider about stool tests like a FIT test every year or Cologuard test every 3 years.  To learn more about your testing options, visit: www.[employer]/886420.pdf.  For help making a decision, visit: andrey/wn27178.  Prostate cancer screening test: If you have a prostate and are age 55 to 69, ask your provider if you would benefit from a yearly prostate cancer screening test.  Lung cancer screening: If you are a current or former smoker age 50 to 80, ask your care team if ongoing lung cancer screenings are right for you.  For informational purposes only. Not to replace the advice of your health care provider. Copyright   2023 [facility]. All rights reserved. Clinically reviewed by the [facility] Transitions Program. [employer] 495828 - REV 04/24.  Bladder Training: Care Instructions  Your Care Instructions     Bladder training is used to treat urge incontinence and stress incontinence. Urge incontinence means that the need to urinate comes on so fast that you can't get to a toilet in time. Stress incontinence means that you leak urine because of pressure on your bladder. For example, it may happen when you laugh, cough, or lift something heavy.  Bladder training can increase how long you can wait before you have to urinate. It can also help your  bladder hold more urine. And it can give you better control over the urge to urinate.  It is important to remember that bladder training takes a few weeks to a few months to make a difference. You may not see results right away, but don't give up.  Follow-up care is a key part of your treatment and safety. Be sure to make and go to all appointments, and call your doctor if you are having problems. It's also a good idea to know your test results and keep a list of the medicines you take.  How can you care for yourself at home?  Work with your doctor to come up with a bladder training program that is right for you. You may use one or more of the following methods.  Delayed urination  In the beginning, try to keep from urinating for 5 minutes after you first feel the need to go.  While you wait, take deep, slow breaths to relax. Kegel exercises can also help you delay the need to go to the bathroom.  After some practice, when you can easily wait 5 minutes to urinate, try to wait 10 minutes before you urinate.  Slowly increase the waiting period until you are able to control when you have to urinate.  Scheduled urination  Empty your bladder when you first wake up in the morning.  Schedule times throughout the day when you will urinate.  Start by going to the bathroom every hour, even if you don't need to go.  Slowly increase the time between trips to the bathroom.  When you have found a schedule that works well for you, keep doing it.  If you wake up during the night and have to urinate, do it. Apply your schedule to waking hours only.  Kegel exercises  These tighten and strengthen pelvic muscles, which can help you control the flow of urine. (If doing these exercises causes pain, stop doing them and talk with your doctor.) To do Kegel exercises:  Squeeze your muscles as if you were trying not to pass gas. Or squeeze your muscles as if you were stopping the flow of urine. Your belly, legs, and buttocks shouldn't  "move.  Hold the squeeze for 3 seconds, then relax for 5 to 10 seconds.  Start with 3 seconds, then add 1 second each week until you are able to squeeze for 10 seconds.  Repeat the exercise 10 times a session. Do 3 to 8 sessions a day.  When should you call for help?  Watch closely for changes in your health, and be sure to contact your doctor if:    Your incontinence is getting worse.     You do not get better as expected.   Where can you learn more?  Go to [email or web address].net/patiented  Enter V684 in the search box to learn more about \"Bladder Training: Care Instructions.\"  Current as of: November 15, 2023               Content Version: 14.0    4026-0759 [employer].   Care instructions adapted under license by your healthcare professional. If you have questions about a medical condition or this instruction, always ask your healthcare professional. [employer] disclaims any warranty or liability for your use of this information.           Subjective   Jung is a 78 year old, presenting for the following:  Physical, Hypertension, Lipids, Head Injury (X3 days ago, Pt slipped and fell getting out of the shower, landed on his butt but hit his head on the wall and would like to discuss concerns of brain bleeds due to family hx of brother having brain bleed after fall. ), and Mouth Problem (X2 months, Discoloration on inside right cheek. No pain, no injury, no bleeding. )        6/27/2024    10:16 AM   Additional Questions   Roomed by Grecia ROMERO MA   Accompanied by self         6/27/2024    10:16 AM   Patient Reported Additional Medications   Patient reports taking the following new medications none         Health Care Directive  Patient has a Health Care Directive on file  Advance care planning document is on file and is current.    HPI  Head injury: X3 days ago, Patient  slipped and fell getting out of the shower, landed on his butt but hit his head on the wall and would like to discuss " concerns of brain bleeds due to family hx of brother having brain bleed after fall. Patient said did not feel he hit head hard. No headache/sore spot on head. Patient denies LOC at the time nor hours later. Denies nausea or emesis in the last 3 days. Patient denies ataxia.    Mouth trouble: X2 months, Discoloration on inside right cheek. No pain, no injury, no bleeding.       Hyperlipidemia Follow-Up    Are you regularly taking any medication or supplement to lower your cholesterol?   Yes- lovastatin  Are you having muscle aches or other side effects that you think could be caused by your cholesterol lowering medication?  No    Hypertension Follow-up    Do you check your blood pressure regularly outside of the clinic? Yes   Are you following a low salt diet? Yes  Are your blood pressures ever more than 140 on the top number (systolic) OR more   than 90 on the bottom number (diastolic), for example 140/90? No    Medication Followup of flonase, tamsulosin  Taking Medication as prescribed: yes  Side Effects:  None  Medication Helping Symptoms:  yes          6/27/2024   General Health   How would you rate your overall physical health? Good   Feel stress (tense, anxious, or unable to sleep) Not at all            6/27/2024   Nutrition   Diet: Regular (no restrictions)    Low salt       Multiple values from one day are sorted in reverse-chronological order         6/27/2024   Exercise   Days per week of moderate/strenous exercise 5 days   Average minutes spent exercising at this level 20 min            6/27/2024   Social Factors   Frequency of gathering with friends or relatives Once a week   Worry food won't last until get money to buy more No   Food not last or not have enough money for food? No   Do you have housing? (Housing is defined as stable permanent housing and does not include staying ouside in a car, in a tent, in an abandoned building, in an overnight shelter, or couch-surfing.) Yes   Are you worried about  losing your housing? No   Lack of transportation? No   Unable to get utilities (heat,electricity)? No            2024   Fall Risk   Fallen 2 or more times in the past year? No   Trouble with walking or balance? No             2024   Activities of Daily Living- Home Safety   Needs help with the following daily activites None of the above   Safety concerns in the home None of the above            2024   Dental   Dentist two times every year? Yes            2024   Hearing Screening   Hearing concerns? None of the above            2024   Driving Risk Screening   Patient/family members have concerns about driving No            2024   General Alertness/Fatigue Screening   Have you been more tired than usual lately? No            2024   Urinary Incontinence Screening   Bothered by leaking urine in past 6 months Yes            2024   TB Screening   Were you born outside of the [place]? No            Today's PHQ-2 Score:       2024    10:16 AM   PHQ-2 (  [employer])   Q1: Little interest or pleasure in doing things 0   Q2: Feeling down, depressed or hopeless 0   PHQ-2 Score 0   Q1: Little interest or pleasure in doing things Not at all   Q2: Feeling down, depressed or hopeless Not at all   PHQ-2 Score 0           2024   Substance Use   Alcohol more than 3/day or more than 7/wk No   Do you have a current opioid prescription? No   How severe/bad is pain from 1 to 10? 0/10 (No Pain)   Do you use any other substances recreationally? No        Social History     Tobacco Use     Smoking status: Former     Current packs/day: 0.00     Average packs/day: 1 pack/day for 7.0 years (7.0 ttl pk-yrs)     Types: Cigarettes     Start date: 1961     Quit date: 1968     Years since quittin.6     Smokeless tobacco: Never   Vaping Use     Vaping status: Never Used   Substance Use Topics     Alcohol use: Yes     Comment: minimal     Drug use: No                 Reviewed and updated as  needed this visit by Provider     Meds                No past medical history on file.  Past Surgical History:   Procedure Laterality Date     COLONOSCOPY  2001    negative      COLONOSCOPY  12/13/2011    Procedure:COLONOSCOPY; Colonoscopy; Surgeon:DARCIE FSIHER; Location:[facility]     COLONOSCOPY N/A 8/10/2016    Procedure: COLONOSCOPY;  Surgeon: Morales Renteria MD;  Location: [facility]     COLONOSCOPY N/A 10/5/2023    Procedure: COLONOSCOPY, WITH POLYPECTOMY AND BIOPSY;  Surgeon: Emmanuel Kaiser MD;  Location: [facility]     LAPAROSCOPIC HERNIORRHAPHY INGUINAL BILATERAL Bilateral 4/16/2019    Procedure: Laparoscopic bilateral inguinal hernia repair and open umbilical hernia repair;  Surgeon: Mckinley Colindres MD;  Location: [facility]     Patient Active Problem List   Diagnosis     Benign neoplasm of colon     Hyperlipidemia LDL goal <130     Herpes zoster     Diverticulosis of large intestine     Family history of colon cancer     Family history of malignant neoplasm of breast     Essential hypertension with goal blood pressure less than 140/90     Dermatitis     Chronic vasomotor rhinitis     Family history of rectal cancer     Past Surgical History:   Procedure Laterality Date     COLONOSCOPY  2001    negative      COLONOSCOPY  12/13/2011    Procedure:COLONOSCOPY; Colonoscopy; Surgeon:DARCIE FISHER; Location:[facility]     COLONOSCOPY N/A 8/10/2016    Procedure: COLONOSCOPY;  Surgeon: Morales Renteria MD;  Location: [facility]     COLONOSCOPY N/A 10/5/2023    Procedure: COLONOSCOPY, WITH POLYPECTOMY AND BIOPSY;  Surgeon: Emmanuel Kaiser MD;  Location: [facility]     LAPAROSCOPIC HERNIORRHAPHY INGUINAL BILATERAL Bilateral 4/16/2019    Procedure: Laparoscopic bilateral inguinal hernia repair and open umbilical hernia repair;  Surgeon: Mckinley Colindres MD;  Location: [facility]       Social History     Tobacco Use     Smoking status: Former     Current packs/day: 0.00     Average packs/day: 1 pack/day for 7.0 years (7.0 ttl  pk-yrs)     Types: Cigarettes     Start date: 1961     Quit date: 1968     Years since quittin.6     Smokeless tobacco: Never   Substance Use Topics     Alcohol use: Yes     Comment: minimal     Family History   Problem Relation Age of Onset     Hypertension Mother      Cerebrovascular Disease Mother          of a stroke     Cancer - colorectal Mother         age 70s     Prostate Cancer Father         had prostate removed     Diabetes Maternal Grandmother      Cancer Brother         bladder cancer     Colon Cancer Daughter 53        colon cancer surger     Breast Cancer Daughter      Asthma No family hx of      C.A.D. No family hx of      Melanoma No family hx of          Current Outpatient Medications   Medication Sig Dispense Refill     ASPIRIN 81 MG OR TABS 1 TABLET DAILY       atenolol (TENORMIN) 25 MG tablet Take 1 tablet (25 mg) by mouth daily 90 tablet 3     fluticasone (FLONASE) 50 MCG/ACT nasal spray Spray 1-2 sprays into both nostrils daily as needed for rhinitis 51 g 3     lovastatin (MEVACOR) 40 MG tablet Take 1 tablet (40 mg) by mouth at bedtime 90 tablet 3     tamsulosin (FLOMAX) 0.4 MG capsule Take 1 capsule (0.4 mg) by mouth daily 90 capsule 3     bisacodyl (DULCOLAX) 5 MG EC tablet Take 2 tablets at 3 pm the day before your procedure. If your procedure is before 11 am, take 2 additional tablets at 11 pm. If your procedure is after 11 am, take 2 additional tablets at 6 am. For additional instructions refer to your colonoscopy prep instructions. (Patient not taking: Reported on 2024) 4 tablet 0     clindamycin (CLEOCIN T) 1 % external lotion Apply twice daily to affected area. (Patient not taking: Reported on 2022) 60 mL 1     polyethylene glycol (GOLYTELY) 236 g suspension The night before the exam at 6 pm drink an 8-ounce glass every 15 minutes until the jug is half empty. If you arrive before 11 AM: Drink the other half of the [employer] jug at 11 PM night before  "procedure. If you arrive after 11 AM: Drink the other half of the [employer] jug at 6 AM day of procedure. For additional instructions refer to your colonoscopy prep instructions. (Patient not taking: Reported on 6/27/2024) 4000 mL 0     triamcinolone (ARISTOCORT HP) 0.5 % external cream Apply topically twice a day as needed to affected area. Call to refill. (Patient not taking: Reported on 5/27/2022) 30 g 3     Allergies   Allergen Reactions     Nkda [No Known Drug Allergy]      Current providers sharing in care for this patient include:  Patient Care Team:  Uvaldo Olivera MD as PCP - General (Family Medicine)  Uvaldo Olivera MD as Assigned PCP    The following health maintenance items are reviewed in Epic and correct as of today:  Health Maintenance   Topic Date Due     ANNUAL REVIEW OF  ORDERS  08/10/2023     COVID-19 Vaccine (8 - 2023-24 season) 02/12/2024     ALT  06/26/2024     BMP  06/26/2024     LIPID  06/26/2024     PSA  06/26/2024     MEDICARE ANNUAL WELLNESS VISIT  06/27/2025     FALL RISK ASSESSMENT  06/27/2025     GLUCOSE  06/26/2026     COLORECTAL CANCER SCREENING  10/05/2028     ADVANCE CARE PLANNING  06/27/2029     DTAP/TDAP/TD IMMUNIZATION (3 - Td or Tdap) 07/28/2033     PHQ-2 (once per calendar year)  Completed     INFLUENZA VACCINE  Completed     Pneumococcal Vaccine: 65+ Years  Completed     ZOSTER IMMUNIZATION  Completed     RSV VACCINE (Pregnancy & 60+)  Completed     IPV IMMUNIZATION  Aged Out     HPV IMMUNIZATION  Aged Out     MENINGITIS IMMUNIZATION  Aged Out     RSV MONOCLONAL ANTIBODY  Aged Out         Review of Systems  Constitutional, HEENT, cardiovascular, pulmonary, GI, , musculoskeletal, neuro, skin, endocrine and psych systems are negative, except as otherwise noted.     Objective    Exam  /80   Pulse 65   Temp 97.4  F (36.3  C) (Tympanic)   Resp 20   Ht 1.702 m (5' 7\")   Wt 83.5 kg (184 lb)   SpO2 98%   BMI 28.82 kg/m     Estimated body mass " "index is 28.82 kg/m  as calculated from the following:    Height as of this encounter: 1.702 m (5' 7\").    Weight as of this encounter: 83.5 kg (184 lb).    Physical Exam  GENERAL APPEARANCE: overweight, ambulatory w/o assist and no ataxia, alert and no distress  EYES: pink conj, no icterus, PERRL, EOMI  HENT: ear canals and TM's normal, nose and mouth without ulcers or lesions, oropharynx clear and oral mucous membranes moist; right buccal mucosa has a round, welldemarcated approx 4 mm pale papule with no surrounding erythema or erosion; no other similar lesion  NECK: no adenopathy, no asymmetry, masses, or scars and thyroid normal to palpation  RESP: lungs clear to auscultation - no rales, rhonchi or wheezes  CV: regular rates and rhythm, normal S1 S2, no S3 or S4, no murmur, click or rub, no peripheral edema and peripheral pulses strong  ABDOMEN: soft, nontender, no hepatosplenomegaly, no masses and bowel sounds normal  RECTAL: deferred  MS: no musculoskeletal defects are noted and gait is age appropriate without ataxia  SKIN: no suspicious lesions or rashes  NEURO: Patient is A and O X 3; Cranial nerves 2-12 intact;  Strength 5/5 all extremities; DTR: ++ x 4; Romberg negative; Sensory grossly intact; no tremors No problems with motor coordination          6/27/2024   Mini Cog   Clock Draw Score 2 Normal   3 Item Recall 3 objects recalled   Mini Cog Total Score 5                 Signed Electronically by: Uvaldo Olivera MD    "

## 2024-07-08 ENCOUNTER — TELEPHONE (OUTPATIENT)
Dept: FAMILY MEDICINE | Facility: CLINIC | Age: 79
End: 2024-07-08
Payer: COMMERCIAL

## 2024-07-08 NOTE — TELEPHONE ENCOUNTER
Reason for Call:  Appointment Request    Patient requesting this type of appt:  covid booster and flu shot, called in by spouse who has another provider, but will be driving in together, requesting to schedule appts together at the end of September    Requested provider: Uvaldo Olivera    Reason patient unable to be scheduled: Needs to be scheduled by clinic    When does patient want to be seen/preferred time:  see above     Comments:     Okay to leave a detailed message?: Yes at Home number on file [phone] (home)    Call taken on 7/8/2024 at 3:33 PM by Kris Seay

## 2024-07-22 NOTE — PROGRESS NOTES
Velasquez Del Rio is a 78 year old male  Chief Complaint: Lesion buccal mucosa, likely from biting few months ago. Asymptomatic, does not get into the way of chewing.  History of Present Illness  Location:Rt cheek mucosa  Quality:mass  Severity:4 mm  Duration:few months     Past Medical History -   Patient Active Problem List   Diagnosis    Benign neoplasm of colon    Hyperlipidemia LDL goal <130    Herpes zoster    Diverticulosis of large intestine    Family history of colon cancer    Family history of malignant neoplasm of breast    Essential hypertension with goal blood pressure less than 140/90    Dermatitis    Chronic vasomotor rhinitis    Family history of rectal cancer       Current Medications -   Current Outpatient Medications:     ASPIRIN 81 MG OR TABS, 1 TABLET DAILY, Disp: , Rfl:     atenolol (TENORMIN) 25 MG tablet, Take 1 tablet (25 mg) by mouth daily, Disp: 90 tablet, Rfl: 3    bisacodyl (DULCOLAX) 5 MG EC tablet, Take 2 tablets at 3 pm the day before your procedure. If your procedure is before 11 am, take 2 additional tablets at 11 pm. If your procedure is after 11 am, take 2 additional tablets at 6 am. For additional instructions refer to your colonoscopy prep instructions. (Patient not taking: Reported on 6/27/2024), Disp: 4 tablet, Rfl: 0    clindamycin (CLEOCIN T) 1 % external lotion, Apply twice daily to affected area. (Patient not taking: Reported on 5/27/2022), Disp: 60 mL, Rfl: 1    fluticasone (FLONASE) 50 MCG/ACT nasal spray, Spray 1-2 sprays into both nostrils daily as needed for rhinitis, Disp: 51 g, Rfl: 3    lovastatin (MEVACOR) 40 MG tablet, Take 1 tablet (40 mg) by mouth at bedtime, Disp: 90 tablet, Rfl: 3    polyethylene glycol (GOLYTELY) 236 g suspension, The night before the exam at 6 pm drink an 8-ounce glass every 15 minutes until the jug is half empty. If you arrive before 11 AM: Drink the other half of the Flaconily jug at 11 PM night before procedure. If you arrive after 11 AM:  Drink the other half of the [employer] jug at 6 AM day of procedure. For additional instructions refer to your colonoscopy prep instructions. (Patient not taking: Reported on 2024), Disp: 4000 mL, Rfl: 0    tamsulosin (FLOMAX) 0.4 MG capsule, Take 1 capsule (0.4 mg) by mouth daily, Disp: 90 capsule, Rfl: 3    triamcinolone (ARISTOCORT HP) 0.5 % external cream, Apply topically twice a day as needed to affected area. Call to refill. (Patient not taking: Reported on 2022), Disp: 30 g, Rfl: 3    Allergies -   Allergies   Allergen Reactions    Nkda [No Known Drug Allergy]        Social History -   Social History     Socioeconomic History    Marital status:    Occupational History    Occupation: "[employer]     Employer: SELF   Tobacco Use    Smoking status: Former     Current packs/day: 0.00     Average packs/day: 1 pack/day for 7.0 years (7.0 ttl pk-yrs)     Types: Cigarettes     Start date: 1961     Quit date: 1968     Years since quittin.7    Smokeless tobacco: Never   Vaping Use    Vaping status: Never Used   Substance and Sexual Activity    Alcohol use: Yes     Comment: minimal    Drug use: No    Sexual activity: Not Currently   Other Topics Concern    Caffeine Concern Yes     Comment: occasional    Exercise Yes     Comment: stretch exercises    Seat Belt Yes    Self-Exams No    Parent/sibling w/ CABG, MI or angioplasty before 65F 55M? No     Social Determinants of Health     Financial Resource Strain: Low Risk  (2024)    Financial Resource Strain     Within the past 12 months, have you or your family members you live with been unable to get utilities (heat, electricity) when it was really needed?: No   Food Insecurity: Low Risk  (2024)    Food Insecurity     Within the past 12 months, did you worry that your food would run out before you got money to buy more?: No     Within the past 12 months, did the food you bought just not last and you didn t have money to  get more?: No   Transportation Needs: Low Risk  (2024)    Transportation Needs     Within the past 12 months, has lack of transportation kept you from medical appointments, getting your medicines, non-medical meetings or appointments, work, or from getting things that you need?: No   Physical Activity: Insufficiently Active (2024)    Exercise Vital Sign     Days of Exercise per Week: 5 days     Minutes of Exercise per Session: 20 min   Stress: No Stress Concern Present (2024)    [employer] - Occupational Stress Questionnaire     Feeling of Stress : Not at all   Social Connections: Unknown (2024)    Social Connection and Isolation Panel [NHANES]     Frequency of Social Gatherings with Friends and Family: Once a week   Interpersonal Safety: Low Risk  (2024)    Interpersonal Safety     Do you feel physically and emotionally safe where you currently live?: Yes     Within the past 12 months, have you been hit, slapped, kicked or otherwise physically hurt by someone?: No     Within the past 12 months, have you been humiliated or emotionally abused in other ways by your partner or ex-partner?: No   Housing Stability: Low Risk  (2024)    Housing Stability     Do you have housing? : Yes     Are you worried about losing your housing?: No       Family History -   Family History   Problem Relation Age of Onset    Hypertension Mother     Cerebrovascular Disease Mother          of a stroke    Cancer - colorectal Mother         age 70s    Prostate Cancer Father         had prostate removed    Diabetes Maternal Grandmother     Cancer Brother         bladder cancer    Colon Cancer Daughter 53        colon cancer surger    Breast Cancer Daughter     Asthma No family hx of     C.A.D. No family hx of     Melanoma No family hx of        Review of Systems:   !.  Weight Loss: No   2. Difficulty Breathing: No   3. Difficulty Swallowing: No   4. Pain: No    Physical Exam  B/P:  Data Unavailable, T: Data Unavailable, P: Data Unavailable, R: Data Unavailable  Vitals: /76 (BP Location: Right arm, Patient Position: Chair, Cuff Size: Adult Large)   Pulse 62   Temp 98.2  F (36.8  C) (Tympanic)   Wt 83.5 kg (184 lb)   BMI 28.82 kg/m    BMI= Body mass index is 28.82 kg/m .    General  Appearance - Normal  Head/Face/Scalp:    Skin - Normal    Facial Palpation - Normal    Facial Strength - Normal  Ears:    Pinna - Normal    Canal - Normal   Tympanic membrane - Normal  Nose:    External - Normal    Septum - Normal    Turbinates - Normal    Middle meatus - Normal  Oral Cavity:    Lips - Normal    Floor of Mouth - Normal    Gingiva - Normal    Tongue - Normal    Buccal - Rt 4 mm soft and smooth lesion - cyst vs fibroma    Palate - Normal  Nasopharynx:    Oropharynx:    Tonsils - Normal    Tongue base - Normal    Soft palate - Normal    Posterior pharyngeal wall - Normal  Hypopharynx:  Larynx:    Epiglottis -     Aryepiglottic folds -     Arytenoids -     False vocal cords -     True vocal cords -  Neck Masses - No  Neck lymphatics - no lymphadenopathy  Thyroid - Normal  Salivary glands - Normal    Audiogram - not applicable  Radiology - not applicable   Reports:   View films:  Procedures - not applicable  Patient Education:     A/P - Velasquez Del Rio is a 78 year old male  Medical Decision Making: Benign lesion Rt buccal mucosa  Asymptomatic. Recommended observation at this point in time

## 2024-07-25 ENCOUNTER — OFFICE VISIT (OUTPATIENT)
Dept: OTOLARYNGOLOGY | Facility: CLINIC | Age: 79
End: 2024-07-25
Attending: FAMILY MEDICINE
Payer: COMMERCIAL

## 2024-07-25 VITALS
WEIGHT: 184 LBS | HEART RATE: 62 BPM | SYSTOLIC BLOOD PRESSURE: 136 MMHG | TEMPERATURE: 98.2 F | BODY MASS INDEX: 28.82 KG/M2 | DIASTOLIC BLOOD PRESSURE: 76 MMHG

## 2024-07-25 DIAGNOSIS — D10.30: Primary | ICD-10-CM

## 2024-07-25 DIAGNOSIS — K13.70 LESION OF BUCCAL MUCOSA: ICD-10-CM

## 2024-07-25 PROCEDURE — 99202 OFFICE O/P NEW SF 15 MIN: CPT | Performed by: OTOLARYNGOLOGY

## 2024-07-25 ASSESSMENT — PAIN SCALES - GENERAL: PAINLEVEL: NO PAIN (0)

## 2024-07-25 NOTE — LETTER
7/25/2024      Velasquez Del Rio  [address]  [facility] [address]      Dear Colleague,    Thank you for referring your patient, Velasquez Del Rio, to the [facility]. Please see a copy of my visit note below.    Velasquez Del Rio is a 78 year old male  Chief Complaint: Lesion buccal mucosa, likely from biting few months ago. Asymptomatic, does not get into the way of chewing.  History of Present Illness  Location:Rt cheek mucosa  Quality:mass  Severity:4 mm  Duration:few months     Past Medical History -   Patient Active Problem List   Diagnosis     Benign neoplasm of colon     Hyperlipidemia LDL goal <130     Herpes zoster     Diverticulosis of large intestine     Family history of colon cancer     Family history of malignant neoplasm of breast     Essential hypertension with goal blood pressure less than 140/90     Dermatitis     Chronic vasomotor rhinitis     Family history of rectal cancer       Current Medications -   Current Outpatient Medications:      ASPIRIN 81 MG OR TABS, 1 TABLET DAILY, Disp: , Rfl:      atenolol (TENORMIN) 25 MG tablet, Take 1 tablet (25 mg) by mouth daily, Disp: 90 tablet, Rfl: 3     bisacodyl (DULCOLAX) 5 MG EC tablet, Take 2 tablets at 3 pm the day before your procedure. If your procedure is before 11 am, take 2 additional tablets at 11 pm. If your procedure is after 11 am, take 2 additional tablets at 6 am. For additional instructions refer to your colonoscopy prep instructions. (Patient not taking: Reported on 6/27/2024), Disp: 4 tablet, Rfl: 0     clindamycin (CLEOCIN T) 1 % external lotion, Apply twice daily to affected area. (Patient not taking: Reported on 5/27/2022), Disp: 60 mL, Rfl: 1     fluticasone (FLONASE) 50 MCG/ACT nasal spray, Spray 1-2 sprays into both nostrils daily as needed for rhinitis, Disp: 51 g, Rfl: 3     lovastatin (MEVACOR) 40 MG tablet, Take 1 tablet (40 mg) by mouth at bedtime, Disp: 90 tablet, Rfl: 3     polyethylene glycol (GOLYTELY)  236 g suspension, The night before the exam at 6 pm drink an 8-ounce glass every 15 minutes until the jug is half empty. If you arrive before 11 AM: Drink the other half of the Golytely jug at 11 PM night before procedure. If you arrive after 11 AM: Drink the other half of the Golytely jug at 6 AM day of procedure. For additional instructions refer to your colonoscopy prep instructions. (Patient not taking: Reported on 2024), Disp: 4000 mL, Rfl: 0     tamsulosin (FLOMAX) 0.4 MG capsule, Take 1 capsule (0.4 mg) by mouth daily, Disp: 90 capsule, Rfl: 3     triamcinolone (ARISTOCORT HP) 0.5 % external cream, Apply topically twice a day as needed to affected area. Call to refill. (Patient not taking: Reported on 2022), Disp: 30 g, Rfl: 3    Allergies -   Allergies   Allergen Reactions     Nkda [No Known Drug Allergy]        Social History -   Social History     Socioeconomic History     Marital status:    Occupational History     Occupation: [employer]     Employer: SELF   Tobacco Use     Smoking status: Former     Current packs/day: 0.00     Average packs/day: 1 pack/day for 7.0 years (7.0 ttl pk-yrs)     Types: Cigarettes     Start date: 1961     Quit date: 1968     Years since quittin.7     Smokeless tobacco: Never   Vaping Use     Vaping status: Never Used   Substance and Sexual Activity     Alcohol use: Yes     Comment: minimal     Drug use: No     Sexual activity: Not Currently   Other Topics Concern     Caffeine Concern Yes     Comment: occasional     Exercise Yes     Comment: stretch exercises     Seat Belt Yes     Self-Exams No     Parent/sibling w/ CABG, MI or angioplasty before 65F 55M? No     Social Determinants of Health     Financial Resource Strain: Low Risk  (2024)    Financial Resource Strain      Within the past 12 months, have you or your family members you live with been unable to get utilities (heat, electricity) when it was really needed?: No    Food Insecurity: Low Risk  (2024)    Food Insecurity      Within the past 12 months, did you worry that your food would run out before you got money to buy more?: No      Within the past 12 months, did the food you bought just not last and you didn t have money to get more?: No   Transportation Needs: Low Risk  (2024)    Transportation Needs      Within the past 12 months, has lack of transportation kept you from medical appointments, getting your medicines, non-medical meetings or appointments, work, or from getting things that you need?: No   Physical Activity: Insufficiently Active (2024)    Exercise Vital Sign      Days of Exercise per Week: 5 days      Minutes of Exercise per Session: 20 min   Stress: No Stress Concern Present (2024)    [employer] - Occupational Stress Questionnaire      Feeling of Stress : Not at all   Social Connections: Unknown (2024)    Social Connection and Isolation Panel [NHANES]      Frequency of Social Gatherings with Friends and Family: Once a week   Interpersonal Safety: Low Risk  (2024)    Interpersonal Safety      Do you feel physically and emotionally safe where you currently live?: Yes      Within the past 12 months, have you been hit, slapped, kicked or otherwise physically hurt by someone?: No      Within the past 12 months, have you been humiliated or emotionally abused in other ways by your partner or ex-partner?: No   Housing Stability: Low Risk  (2024)    Housing Stability      Do you have housing? : Yes      Are you worried about losing your housing?: No       Family History -   Family History   Problem Relation Age of Onset     Hypertension Mother      Cerebrovascular Disease Mother          of a stroke     Cancer - colorectal Mother         age 70s     Prostate Cancer Father         had prostate removed     Diabetes Maternal Grandmother      Cancer Brother         bladder cancer     Colon Cancer  Daughter 53        colon cancer surger     Breast Cancer Daughter      Asthma No family hx of      C.A.D. No family hx of      Melanoma No family hx of        Review of Systems:   !.  Weight Loss: No   2. Difficulty Breathing: No   3. Difficulty Swallowing: No   4. Pain: No    Physical Exam  B/P: Data Unavailable, T: Data Unavailable, P: Data Unavailable, R: Data Unavailable  Vitals: /76 (BP Location: Right arm, Patient Position: Chair, Cuff Size: Adult Large)   Pulse 62   Temp 98.2  F (36.8  C) (Tympanic)   Wt 83.5 kg (184 lb)   BMI 28.82 kg/m    BMI= Body mass index is 28.82 kg/m .    General  Appearance - Normal  Head/Face/Scalp:    Skin - Normal    Facial Palpation - Normal    Facial Strength - Normal  Ears:    Pinna - Normal    Canal - Normal   Tympanic membrane - Normal  Nose:    External - Normal    Septum - Normal    Turbinates - Normal    Middle meatus - Normal  Oral Cavity:    Lips - Normal    Floor of Mouth - Normal    Gingiva - Normal    Tongue - Normal    Buccal - Rt 4 mm soft and smooth lesion - cyst vs fibroma    Palate - Normal  Nasopharynx:    Oropharynx:    Tonsils - Normal    Tongue base - Normal    Soft palate - Normal    Posterior pharyngeal wall - Normal  Hypopharynx:  Larynx:    Epiglottis -     Aryepiglottic folds -     Arytenoids -     False vocal cords -     True vocal cords -  Neck Masses - No  Neck lymphatics - no lymphadenopathy  Thyroid - Normal  Salivary glands - Normal    Audiogram - not applicable  Radiology - not applicable   Reports:   View films:  Procedures - not applicable  Patient Education:     A/P - Velasquez Del Rio is a 78 year old male  Medical Decision Making: Benign lesion Rt buccal mucosa  Asymptomatic. Recommended observation at this point in time      Again, thank you for allowing me to participate in the care of your patient.        Sincerely,        Misha Franklin MD

## 2024-07-25 NOTE — NURSING NOTE
"Initial /76 (BP Location: Right arm, Patient Position: Chair, Cuff Size: Adult Large)   Pulse 62   Temp 98.2  F (36.8  C) (Tympanic)   Wt 83.5 kg (184 lb)   BMI 28.82 kg/m   Estimated body mass index is 28.82 kg/m  as calculated from the following:    Height as of 6/27/24: 1.702 m (5' 7\").    Weight as of this encounter: 83.5 kg (184 lb). .  Katherine Lim LPN    "

## 2024-09-03 ENCOUNTER — TELEPHONE (OUTPATIENT)
Dept: FAMILY MEDICINE | Facility: CLINIC | Age: 79
End: 2024-09-03
Payer: COMMERCIAL

## 2024-09-03 NOTE — TELEPHONE ENCOUNTER
Order/Referral Request    Who is requesting: Patient     Orders being requested: ear wash with nurse visit    Reason service is needed/diagnosis: order needed per EPIC     When are orders needed by: as soon as possible    Has this been discussed with Provider: Yes    Does patient have a preference on a Group/Provider/Facility? [facility]    Does patient have an appointment scheduled?: No    Where to send orders: Place orders within Epic    Okay to leave a detailed message?: Yes at Home number on file [phone] ([place])

## 2024-09-04 NOTE — TELEPHONE ENCOUNTER
Same day appt tomorrow with Dr Olivera as directed.  Order is needed prior to ear wash.  Mi Mahoney RN

## 2024-09-04 NOTE — TELEPHONE ENCOUNTER
Pt returned call. States at his last appt with Dr Olivera was told for wax build up he should use ear gtts for a couple days and schedule a nurse visit.   No notes or orders found in last visit    Sending to provider for order if appropriae      Hank Capone RN

## 2024-09-04 NOTE — TELEPHONE ENCOUNTER
Okay to schedule patient for RN visit for ear check and possible ear irrigation for both ears.  May schedule on a day that I am in clinic. May pull me from clinic to look at ear prior to irrigation if need be.

## 2024-09-05 ENCOUNTER — OFFICE VISIT (OUTPATIENT)
Dept: FAMILY MEDICINE | Facility: CLINIC | Age: 79
End: 2024-09-05
Payer: COMMERCIAL

## 2024-09-05 VITALS
WEIGHT: 188.4 LBS | OXYGEN SATURATION: 97 % | HEART RATE: 82 BPM | DIASTOLIC BLOOD PRESSURE: 72 MMHG | BODY MASS INDEX: 28.55 KG/M2 | SYSTOLIC BLOOD PRESSURE: 138 MMHG | RESPIRATION RATE: 16 BRPM | TEMPERATURE: 97.4 F | HEIGHT: 68 IN

## 2024-09-05 DIAGNOSIS — H61.21 IMPACTED CERUMEN OF RIGHT EAR: ICD-10-CM

## 2024-09-05 DIAGNOSIS — H61.22 IMPACTED CERUMEN OF LEFT EAR: ICD-10-CM

## 2024-09-05 DIAGNOSIS — K42.9 REDUCIBLE UMBILICAL HERNIA: Primary | ICD-10-CM

## 2024-09-05 PROCEDURE — 99213 OFFICE O/P EST LOW 20 MIN: CPT | Mod: 25 | Performed by: FAMILY MEDICINE

## 2024-09-05 PROCEDURE — 69209 REMOVE IMPACTED EAR WAX UNI: CPT | Performed by: FAMILY MEDICINE

## 2024-09-05 ASSESSMENT — PAIN SCALES - GENERAL: PAINLEVEL: NO PAIN (0)

## 2024-09-05 NOTE — PROGRESS NOTES
Assessment & Plan     Reducible umbilical hernia  No sign of incarceration.  Referred to gen surg for consideration for herniorrhaphy  Advised lifting precautions and activity modification.  Acute abdomen precautions discussed.   - Adult Gen Surg  Referral    Impacted cerumen of left ear  - OH REMOVAL IMPACTED CERUMEN IRRIGATION/LVG UNILAT    Impacted cerumen of right ear  - OH REMOVAL IMPACTED CERUMEN IRRIGATION/LVG UNILAT      Discussed with patient findings on exam.  Advised on treatment options; she concurred with aural irrigation today.  Patient was advised of procedure, expected outcome and possible risks.  CMA performed aural irrigation to both ears using warm tap water  with complete evacuation of cerumen from both ears.  Tympanic membrane visualized intact both ears.  Advised routine ear care.  Return precautions discussed and given to patient.         Patient Instructions   Referrals to general surgery has been signed. Schedulers will call you in the next 3-5 business days.    Earwax  Do not to probe ear canal with any foreign object to prevent impaction or eardruum perforation. If with more symptoms or new symptoms, return to clinic.  You may consider having your ears examined and possibly flushed in clinic at least once a year or as needed.      Subjective   Jung is a 78 year old, presenting for the following health issues:  Ear Problem and Hernia (Possible umbilical hernia)      9/5/2024     8:39 AM   Additional Questions   Roomed by Humera POWERS       Concern - Ear cleaning, has been using debrox x5 days. Patient said hearing is a little muffled both ears. Denies pain.      Concern - Umbilical hernia  Onset: 3 weeks  Description:  small bump above umbilicus  Intensity: mild  Progression of Symptoms:  same  Accompanying Signs & Symptoms: occasional pain when pressing on it  Previous history of similar problem: yes - has had repaired inguinal ehrnia years ago.  Precipitating factors:         "Worsened by: none  Denies heavy lifting or trauma.  Did notice it more when he strains to have a BM.  Alleviating factors:        Improved by: laying down.  Therapies tried and outcome: None        Review of Systems  Constitutional, neuro, ENT, endocrine, pulmonary, cardiac, gastrointestinal, genitourinary, musculoskeletal, integument and psychiatric systems are negative, except as otherwise noted.      Objective    /72 (BP Location: Right arm, Patient Position: Sitting, Cuff Size: Adult Regular)   Pulse 82   Temp 97.4  F (36.3  C) (Tympanic)   Resp 16   Ht 1.734 m (5' 8.25\")   Wt 85.5 kg (188 lb 6.4 oz)   SpO2 97%   BMI 28.44 kg/m    Body mass index is 28.44 kg/m .  Physical Exam   GENERAL: overweight , alert and no distress  EYES: no icterus  LEFT EAR: EAM impacted cerumen present  RIGHT EAR: EAM impacted cerumen present   ABD: protuberant abdomen, no skin changes, no TTP, reducible palpable mass superior to the umbilicus, no guarding, no Yarbrough's sign, no palpable organomegaly  MS: extremities- no gross deformities noted, no edema  SKIN: good turgor, no jaundice or rash     No results found for any visits on 09/05/24.        Signed Electronically by: Uvaldo Olivera MD    "

## 2024-09-05 NOTE — PATIENT INSTRUCTIONS
Referrals to general surgery has been signed. Schedulers will call you in the next 3-5 business days.    Earwax  Do not to probe ear canal with any foreign object to prevent impaction or eardruum perforation. If with more symptoms or new symptoms, return to clinic.  You may consider having your ears examined and possibly flushed in clinic at least once a year or as needed.

## 2024-09-12 ENCOUNTER — OFFICE VISIT (OUTPATIENT)
Dept: SURGERY | Facility: CLINIC | Age: 79
End: 2024-09-12
Attending: FAMILY MEDICINE
Payer: COMMERCIAL

## 2024-09-12 VITALS
SYSTOLIC BLOOD PRESSURE: 138 MMHG | HEART RATE: 80 BPM | WEIGHT: 188 LBS | OXYGEN SATURATION: 98 % | DIASTOLIC BLOOD PRESSURE: 79 MMHG | HEIGHT: 68 IN | TEMPERATURE: 97.5 F | BODY MASS INDEX: 28.49 KG/M2

## 2024-09-12 DIAGNOSIS — K43.9 EPIGASTRIC HERNIA: Primary | ICD-10-CM

## 2024-09-12 DIAGNOSIS — M62.08 DIASTASIS RECTI: ICD-10-CM

## 2024-09-12 PROCEDURE — 99213 OFFICE O/P EST LOW 20 MIN: CPT | Performed by: SURGERY

## 2024-09-12 ASSESSMENT — PAIN SCALES - GENERAL: PAINLEVEL: NO PAIN (0)

## 2024-09-12 NOTE — PROGRESS NOTES
Surgical Consultation/History and Physical  [facility]    Velasquez is seen in consultation for Hernia, at the request of Uvaldo Olivera MD.    Chief Complaint:  Hernia    History of Present Illness: Velasquez Del Rio is a 79 year old male presents with hernia.  He has a history of primary hernia repair with laparoscopic inguinal hernias.  He noted a bulge at umbilicus 5 weeks ago.  He is quite activity.  He admits straining with voiding, worse at night.      Patient Active Problem List   Diagnosis    Benign neoplasm of colon    Hyperlipidemia LDL goal <130    Herpes zoster    Diverticulosis of large intestine    Family history of colon cancer    Family history of malignant neoplasm of breast    Essential hypertension with goal blood pressure less than 140/90    Dermatitis    Chronic vasomotor rhinitis    Family history of rectal cancer     No past medical history on file.    Past Surgical History:   Procedure Laterality Date    COLONOSCOPY      negative     COLONOSCOPY  2011    Procedure:COLONOSCOPY; Colonoscopy; Surgeon:DARCIE FISHER; Location:[facility]    COLONOSCOPY N/A 8/10/2016    Procedure: COLONOSCOPY;  Surgeon: Morales Renteria MD;  Location: [facility]    COLONOSCOPY N/A 10/5/2023    Procedure: COLONOSCOPY, WITH POLYPECTOMY AND BIOPSY;  Surgeon: Emmanuel Kaiser MD;  Location: [facility]    LAPAROSCOPIC HERNIORRHAPHY INGUINAL BILATERAL Bilateral 2019    Procedure: Laparoscopic bilateral inguinal hernia repair and open umbilical hernia repair;  Surgeon: Mckinley Colindres MD;  Location: [facility]     Family History   Problem Relation Age of Onset    Hypertension Mother     Cerebrovascular Disease Mother          of a stroke    Cancer - colorectal Mother         age 70s    Prostate Cancer Father         had prostate removed    Cancer Brother         bladder cancer    Diabetes Maternal Grandmother     Colon Cancer Daughter 53        colon cancer surger    Breast Cancer Daughter      "Asthma No family hx of     C.A.D. No family hx of     Melanoma No family hx of      Social History     Tobacco Use    Smoking status: Former     Current packs/day: 0.00     Average packs/day: 1 pack/day for 7.0 years (7.0 ttl pk-yrs)     Types: Cigarettes     Start date: 1961     Quit date: 1968     Years since quittin.8    Smokeless tobacco: Never   Substance Use Topics    Alcohol use: Yes     Comment: minimal        History   Drug Use No       Current Outpatient Medications   Medication Sig Dispense Refill    ASPIRIN 81 MG OR TABS 1 TABLET DAILY      atenolol (TENORMIN) 25 MG tablet Take 1 tablet (25 mg) by mouth daily 90 tablet 3    fluticasone (FLONASE) 50 MCG/ACT nasal spray Spray 1-2 sprays into both nostrils daily as needed for rhinitis 51 g 3    lovastatin (MEVACOR) 40 MG tablet Take 1 tablet (40 mg) by mouth at bedtime 90 tablet 3    tamsulosin (FLOMAX) 0.4 MG capsule Take 1 capsule (0.4 mg) by mouth daily 90 capsule 3    triamcinolone (ARISTOCORT HP) 0.5 % external cream Apply topically twice a day as needed to affected area. Call to refill. 30 g 3       Allergies   Allergen Reactions    Nkda [No Known Drug Allergy]        Review of Systems:   5 point ROS otherwise negative    Physical Exam:  /79   Pulse 80   Temp 97.5  F (36.4  C) (Tympanic)   Ht 1.734 m (5' 8.25\")   Wt 85.3 kg (188 lb)   SpO2 98%   BMI 28.38 kg/m      Constitutional- No acute distress, well nourished, non-toxic  Eyes: Anicteric, no injection.  PERRL  ENT:  Normocephalic, atraumatic, Nose midline, moist mucus membranes  Neck - supple, no LAD  Respiratory- Clear to auscultation bilaterally, good inspiratory effort  Cardiovascular - Heart RRR, no lift's, thrills, murmurs, rubs, or gallop.  No peripheral edema.  No clubbing.  Abdomen - Soft, non-tender, +BS, no hepatosplenomegaly, moderate diastasis recti, possible epigastric hernia to right  Rectal - good tone, no masses, guaiac negative  Neuro - No focal neuro " deficits, Alert and oriented x 3  Psych: Appropriate mood and affect  Musculoskeletal: Normal gait, symmetric strength.  FROM upper and lower extremities.  Skin: Warm, Dry    Assessment:  1. Epigastric hernia    2. Diastasis recti      Plan:   Mr. Del Rio presents with diastasis recti and a small likely epigastric hernia.  We reviewed pathophysiology and hernia repair.  He is uncertain if he would like to proceed with surgery at this present time and may wish to [place]  No obstipation symptoms.  I discussed an ultrasound would better delineate anatomy and determine type of hernia he has.  All questions answered.  Ultrasound pending.  Results to be called and plans for potential repair made at that time.        Jordin Estevez,  on 9/12/2024 at 9:57 AM

## 2024-09-12 NOTE — LETTER
2024      Velasquez Del Rio  [address]  [facility] [address]      Dear Colleague,    Thank you for referring your patient, Velasquez Del Rio, to the [facility]. Please see a copy of my visit note below.    Surgical Consultation/History and Physical  [facility]    Velasquez is seen in consultation for Hernia, at the request of Uvaldo Olivera MD.    Chief Complaint:  Hernia    History of Present Illness: Velasquez Del Rio is a 79 year old male presents with hernia.  He has a history of primary hernia repair with laparoscopic inguinal hernias.  He noted a bulge at umbilicus 5 weeks ago.  He is quite activity.  He admits straining with voiding, worse at night.      Patient Active Problem List   Diagnosis     Benign neoplasm of colon     Hyperlipidemia LDL goal <130     Herpes zoster     Diverticulosis of large intestine     Family history of colon cancer     Family history of malignant neoplasm of breast     Essential hypertension with goal blood pressure less than 140/90     Dermatitis     Chronic vasomotor rhinitis     Family history of rectal cancer     No past medical history on file.    Past Surgical History:   Procedure Laterality Date     COLONOSCOPY      negative      COLONOSCOPY  2011    Procedure:COLONOSCOPY; Colonoscopy; Surgeon:DARCIE FISHER; Location:[facility]     COLONOSCOPY N/A 8/10/2016    Procedure: COLONOSCOPY;  Surgeon: Morales Renteria MD;  Location: [facility]     COLONOSCOPY N/A 10/5/2023    Procedure: COLONOSCOPY, WITH POLYPECTOMY AND BIOPSY;  Surgeon: Emmanuel Kaiser MD;  Location: [facility]     LAPAROSCOPIC HERNIORRHAPHY INGUINAL BILATERAL Bilateral 2019    Procedure: Laparoscopic bilateral inguinal hernia repair and open umbilical hernia repair;  Surgeon: Mckinley Colindres MD;  Location: [facility]     Family History   Problem Relation Age of Onset     Hypertension Mother      Cerebrovascular Disease Mother          of a stroke      "Cancer - colorectal Mother         age 70s     Prostate Cancer Father         had prostate removed     Cancer Brother         bladder cancer     Diabetes Maternal Grandmother      Colon Cancer Daughter 53        colon cancer surger     Breast Cancer Daughter      Asthma No family hx of      C.A.D. No family hx of      Melanoma No family hx of      Social History     Tobacco Use     Smoking status: Former     Current packs/day: 0.00     Average packs/day: 1 pack/day for 7.0 years (7.0 ttl pk-yrs)     Types: Cigarettes     Start date: 1961     Quit date: 1968     Years since quittin.8     Smokeless tobacco: Never   Substance Use Topics     Alcohol use: Yes     Comment: minimal        History   Drug Use No       Current Outpatient Medications   Medication Sig Dispense Refill     ASPIRIN 81 MG OR TABS 1 TABLET DAILY       atenolol (TENORMIN) 25 MG tablet Take 1 tablet (25 mg) by mouth daily 90 tablet 3     fluticasone (FLONASE) 50 MCG/ACT nasal spray Spray 1-2 sprays into both nostrils daily as needed for rhinitis 51 g 3     lovastatin (MEVACOR) 40 MG tablet Take 1 tablet (40 mg) by mouth at bedtime 90 tablet 3     tamsulosin (FLOMAX) 0.4 MG capsule Take 1 capsule (0.4 mg) by mouth daily 90 capsule 3     triamcinolone (ARISTOCORT HP) 0.5 % external cream Apply topically twice a day as needed to affected area. Call to refill. 30 g 3       Allergies   Allergen Reactions     Nkda [No Known Drug Allergy]        Review of Systems:   5 point ROS otherwise negative    Physical Exam:  /79   Pulse 80   Temp 97.5  F (36.4  C) (Tympanic)   Ht 1.734 m (5' 8.25\")   Wt 85.3 kg (188 lb)   SpO2 98%   BMI 28.38 kg/m      Constitutional- No acute distress, well nourished, non-toxic  Eyes: Anicteric, no injection.  PERRL  ENT:  Normocephalic, atraumatic, Nose midline, moist mucus membranes  Neck - supple, no LAD  Respiratory- Clear to auscultation bilaterally, good inspiratory effort  Cardiovascular - Heart " RRR, no lift's, thrills, murmurs, rubs, or gallop.  No peripheral edema.  No clubbing.  Abdomen - Soft, non-tender, +BS, no hepatosplenomegaly, moderate diastasis recti, possible epigastric hernia to right  Rectal - good tone, no masses, guaiac negative  Neuro - No focal neuro deficits, Alert and oriented x 3  Psych: Appropriate mood and affect  Musculoskeletal: Normal gait, symmetric strength.  FROM upper and lower extremities.  Skin: Warm, Dry    Assessment:  1. Epigastric hernia    2. Diastasis recti      Plan:   Mr. Del Rio presents with diastasis recti and a small likely epigastric hernia.  We reviewed pathophysiology and hernia repair.  He is uncertain if he would like to proceed with surgery at this present time and may wish to wait [place]  No obstipation symptoms.  I discussed an ultrasound would better delineate anatomy and determine type of hernia he has.  All questions answered.  Ultrasound pending.  Results to be called and plans for potential repair made at that time.        Jordin Estevez DO on 9/12/2024 at 9:57 AM      Again, thank you for allowing me to participate in the care of your patient.        Sincerely,        Jordin Estevez DO

## 2024-09-13 ENCOUNTER — HOSPITAL ENCOUNTER (OUTPATIENT)
Dept: ULTRASOUND IMAGING | Facility: CLINIC | Age: 79
Discharge: HOME | End: 2024-09-13
Attending: SURGERY | Admitting: SURGERY
Payer: COMMERCIAL

## 2024-09-13 DIAGNOSIS — K43.9 EPIGASTRIC HERNIA: ICD-10-CM

## 2024-09-13 PROCEDURE — 76705 ECHO EXAM OF ABDOMEN: CPT

## 2024-09-20 ENCOUNTER — TELEPHONE (OUTPATIENT)
Dept: SURGERY | Facility: CLINIC | Age: 79
End: 2024-09-20
Payer: COMMERCIAL

## 2024-09-20 DIAGNOSIS — K43.9 EPIGASTRIC HERNIA: Primary | ICD-10-CM

## 2024-09-20 NOTE — TELEPHONE ENCOUNTER
Called patient regarding hernia.  He would like to hold off on surgery until [place].  Explained signs/symptoms of strangulation/incarceration.  All questions answered.

## 2024-09-23 ENCOUNTER — TELEPHONE (OUTPATIENT)
Dept: SURGERY | Facility: CLINIC | Age: 79
End: 2024-09-23

## 2024-09-23 NOTE — TELEPHONE ENCOUNTER
Spoke with patient he is noting that he will call to schedule in the spring when he gets back from [phone]

## 2024-10-09 ENCOUNTER — IMMUNIZATION (OUTPATIENT)
Dept: FAMILY MEDICINE | Facility: CLINIC | Age: 79
End: 2024-10-09
Payer: COMMERCIAL

## 2024-10-09 PROCEDURE — 90480 ADMN SARSCOV2 VAC 1/ONLY CMP: CPT

## 2024-10-09 PROCEDURE — G0008 ADMIN INFLUENZA VIRUS VAC: HCPCS

## 2024-10-09 PROCEDURE — 91320 SARSCV2 VAC 30MCG TRS-SUC IM: CPT

## 2024-10-09 PROCEDURE — 90662 IIV NO PRSV INCREASED AG IM: CPT

## 2025-02-18 ENCOUNTER — TELEPHONE (OUTPATIENT)
Dept: FAMILY MEDICINE | Facility: CLINIC | Age: 80
End: 2025-02-18
Payer: COMMERCIAL

## 2025-02-18 ENCOUNTER — HOSPITAL ENCOUNTER (OUTPATIENT)
Facility: CLINIC | Age: 80
End: 2025-02-18
Attending: SURGERY | Admitting: SURGERY
Payer: COMMERCIAL

## 2025-02-18 NOTE — TELEPHONE ENCOUNTER
FYI - Status Update    Who is Calling: patient    Update: Patient is looking to have hernia surgery in spring this year. Needs a new referral     Does caller want a call/response back: Yes     Okay to leave a detailed message?: Yes at Cell number on file:    Telephone Information:   Mobile [phone]

## 2025-02-18 NOTE — TELEPHONE ENCOUNTER
Referral in chart and surgery planned for April per chart review  Patricia Toth RN on 2/18/2025 at 2:14 PM

## 2025-04-09 DIAGNOSIS — I10 ESSENTIAL HYPERTENSION WITH GOAL BLOOD PRESSURE LESS THAN 140/90: ICD-10-CM

## 2025-04-09 DIAGNOSIS — N40.1 BENIGN PROSTATIC HYPERPLASIA WITH INCOMPLETE BLADDER EMPTYING: ICD-10-CM

## 2025-04-09 DIAGNOSIS — R39.14 BENIGN PROSTATIC HYPERPLASIA WITH INCOMPLETE BLADDER EMPTYING: ICD-10-CM

## 2025-04-09 DIAGNOSIS — E78.5 HYPERLIPIDEMIA LDL GOAL <130: ICD-10-CM

## 2025-04-09 RX ORDER — ATENOLOL 25 MG/1
25 TABLET ORAL DAILY
Qty: 60 TABLET | Refills: 5 | OUTPATIENT
Start: 2025-04-09

## 2025-04-09 RX ORDER — LOVASTATIN 40 MG/1
40 TABLET ORAL AT BEDTIME
Qty: 60 TABLET | Refills: 5 | OUTPATIENT
Start: 2025-04-09

## 2025-04-09 RX ORDER — TAMSULOSIN HYDROCHLORIDE 0.4 MG/1
0.4 CAPSULE ORAL DAILY
Qty: 60 CAPSULE | Refills: 5 | OUTPATIENT
Start: 2025-04-09

## 2025-04-16 ENCOUNTER — OFFICE VISIT (OUTPATIENT)
Dept: FAMILY MEDICINE | Facility: CLINIC | Age: 80
End: 2025-04-16
Payer: COMMERCIAL

## 2025-04-16 VITALS
OXYGEN SATURATION: 95 % | TEMPERATURE: 97.3 F | WEIGHT: 193.2 LBS | RESPIRATION RATE: 20 BRPM | SYSTOLIC BLOOD PRESSURE: 128 MMHG | HEIGHT: 68 IN | DIASTOLIC BLOOD PRESSURE: 72 MMHG | HEART RATE: 78 BPM | BODY MASS INDEX: 29.28 KG/M2

## 2025-04-16 DIAGNOSIS — R94.31 ABNORMAL FINDING ON EKG: ICD-10-CM

## 2025-04-16 DIAGNOSIS — E78.5 HYPERLIPIDEMIA LDL GOAL <130: ICD-10-CM

## 2025-04-16 DIAGNOSIS — I44.7 LBBB (LEFT BUNDLE BRANCH BLOCK): ICD-10-CM

## 2025-04-16 DIAGNOSIS — R39.14 BENIGN PROSTATIC HYPERPLASIA WITH INCOMPLETE BLADDER EMPTYING: ICD-10-CM

## 2025-04-16 DIAGNOSIS — N40.1 BENIGN PROSTATIC HYPERPLASIA WITH INCOMPLETE BLADDER EMPTYING: ICD-10-CM

## 2025-04-16 DIAGNOSIS — Z01.818 PREOP GENERAL PHYSICAL EXAM: Primary | ICD-10-CM

## 2025-04-16 DIAGNOSIS — I10 ESSENTIAL HYPERTENSION WITH GOAL BLOOD PRESSURE LESS THAN 140/90: ICD-10-CM

## 2025-04-16 DIAGNOSIS — K42.9 REDUCIBLE UMBILICAL HERNIA: ICD-10-CM

## 2025-04-16 PROCEDURE — 1126F AMNT PAIN NOTED NONE PRSNT: CPT | Performed by: FAMILY MEDICINE

## 2025-04-16 PROCEDURE — 3078F DIAST BP <80 MM HG: CPT | Performed by: FAMILY MEDICINE

## 2025-04-16 PROCEDURE — 99215 OFFICE O/P EST HI 40 MIN: CPT | Performed by: FAMILY MEDICINE

## 2025-04-16 PROCEDURE — 93000 ELECTROCARDIOGRAM COMPLETE: CPT | Performed by: FAMILY MEDICINE

## 2025-04-16 PROCEDURE — 3074F SYST BP LT 130 MM HG: CPT | Performed by: FAMILY MEDICINE

## 2025-04-16 ASSESSMENT — PAIN SCALES - GENERAL: PAINLEVEL_OUTOF10: NO PAIN (0)

## 2025-04-16 NOTE — PATIENT INSTRUCTIONS
Due to new finding on EKG, lexiscan stress test is ordered.  If you do not receive a call to schedule in the next 24 hours, Contact Cardiac Services  at [phone], to schedule this appointment.     If you have persistent chest pain, persistent or worsening breathing difficulty, palpitation, lightheadedness or other concerning symptoms, you should be brought to the ER or call 911.  Surgery postponed until your heart evaluation is completed and you are cleared from any serious heart condition.    How to Take Your Medication Before Surgery  Preoperative Medication Instructions   Antiplatelet or Anticoagulation Medication Instructions   - aspirin: Discontinue aspirin 7 days prior to procedure to reduce bleeding risk. It should be resumed postoperatively.     Additional Medication Instructions  Take all scheduled medications on the day of surgery EXCEPT for modifications listed below:  Hold flonase and triamcinolone cream on the day of surgery.    Do not take Ibuprofen, Aspirin or Naproxen 7 days before procedure  If you need to take something for pain, take Acetaminophen 325 mg orally 1-2 tabs every 4-6 hrs as needed for pain     Referrals to urology has been signed. Schedulers will call you in the next 3-5 business days.        Patient Education   Preparing for Your Surgery  For Adults  Getting started  In most cases, a nurse will call to review your health history and instructions. They will give you an arrival time based on your scheduled surgery time. Please be ready to share:  Your doctor's clinic name and phone number  Your medical, surgical, and anesthesia history  A list of allergies and sensitivities  A list of medicines, including herbal treatments and over-the-counter drugs  Whether the patient has a legal guardian (ask how to send us the papers in advance)  Note: You may not receive a call if you were seen at our PAC (Preoperative Assessment Center).  Please tell us if you're pregnant--or if  there's any chance you might be pregnant. Some surgeries may injure a fetus (unborn baby), so they require a pregnancy test. Surgeries that are safe for a fetus don't always need a test, and you can choose whether to have one.   Preparing for surgery  Within 10 to 30 days of surgery: Have a pre-op exam (sometimes called an H&P, or History and Physical). This can be done at a clinic or pre-operative center.  If you're having a , you may not need this exam. Talk to your care team.  At your pre-op exam, talk to your care team about all medicines you take. (This includes CBD oil and any drugs, such as THC, marijuana, and other forms of cannabis.) If you need to stop any medicine before surgery, ask when to start taking it again.  This is for your safety. Many medicines and drugs can make you bleed too much during surgery. Some change how well surgery (anesthesia) drugs work.  Call your insurance company to let them know you're having surgery. (If you don't have insurance, call [phone].)  Call your clinic if there's any change in your health. This includes a scrape or scratch near the surgery site, or any signs of a cold (sore throat, runny nose, cough, rash, fever).  Eating and drinking guidelines  For your safety: Unless your surgeon tells you otherwise, follow the guidelines below.  Eat and drink as normal until 8 hours before you arrive for surgery. After that, no food or milk. You can spit out gum when you arrive.  Drink clear liquids until 2 hours before you arrive. These are liquids you can see through, like water, Gatorade, and Propel Water. They also include plain black coffee and tea (no cream or milk).  No alcohol for 24 hours before you arrive. The night before surgery, stop any drinks that contain THC.  If your care team tells you to take medicine on the morning of surgery, it's okay to take it with a sip of water. No other medicines or drugs are allowed (including CBD oil)--follow your care  team's instructions.  If you have questions the day of surgery, call your hospital or surgery center.   Preventing infection  Shower or bathe the night before and the morning of surgery. Follow the instructions your clinic gave you. (If no instructions, use regular soap.)  Don't shave or clip hair near your surgery site. We'll remove the hair if needed.  Don't smoke or vape the morning of surgery. No chewing tobacco for 6 hours before you arrive. A nicotine patch is okay. You may spit out nicotine gum when you arrive.  For some surgeries, the surgeon will tell you to fully quit smoking and nicotine.  We will make every effort to keep you safe from infection. We will:  Clean our hands often with soap and water (or an alcohol-based hand rub).  Clean the skin at your surgery site with a special soap that kills germs.  Give you a special gown to keep you warm. (Cold raises the risk of infection.)  Wear hair covers, masks, gowns, and gloves during surgery.  Give antibiotic medicine, if prescribed. Not all surgeries need this medicine.  What to bring on the day of surgery  Photo ID and insurance card  Copy of your health care directive, if you have one  Glasses and hearing aids (bring cases)  You can't wear contacts during surgery  Inhaler and eye drops, if you use them (tell us about these when you arrive)  CPAP machine or breathing device, if you use them  A few personal items, if spending the night  If you have . . .  A pacemaker, ICD (cardiac defibrillator), or other implant: Bring the ID card.  An implanted stimulator: Bring the remote control.  A legal guardian: Bring a copy of the certified (court-stamped) guardianship papers.  Please remove any jewelry, including body piercings. Leave jewelry and other valuables at home.  If you're going home the day of surgery  You must have a responsible adult drive you home. They should stay with you overnight as well.  If you don't have someone to stay with you, and you aren't  safe to go home alone, we may keep you overnight. Insurance often won't pay for this.  After surgery  If it's hard to control your pain or you need more pain medicine, please call your surgeon's office.  Questions?   If you have any questions for your care team, list them here:   ____________________________________________________________________________________________________________________________________________________________________________________________________________________________________________________________  For informational purposes only. Not to replace the advice of your health care provider. Copyright   2003, 2019 [facility]. All rights reserved. Clinically reviewed by Ebenezer Liu MD. [employer] 148473 - REV 08/24.

## 2025-04-16 NOTE — PROGRESS NOTES
Preoperative Evaluation  [facility]  [address] [facility] [address]  [facility] [address]  Phone: [phone]  Primary Provider: Uvaldo Olivera MD  Pre-op Performing Provider: Uvaldo Olivera MD  Apr 16, 2025 4/16/2025   Surgical Information   What procedure is being done? hernia repair    Facility or Hospital where procedure/surgery will be performed: [facility]    Who is doing the procedure / surgery? Dr. Estevez    Date of surgery / procedure: 4/18/25    Time of surgery / procedure: 7:30    Where do you plan to recover after surgery? at home with family        Proxy-reported     Fax number for surgical facility: Note does not need to be faxed, will be available electronically in Epic.    Assessment & Plan     The proposed surgical procedure is considered INTERMEDIATE risk.    Preop general physical exam  Due to new EKG finding of LBBB, postpone until cadiac work up completed and verified absence of serious heart condition.  - NM Lexiscan stress test    Reducible umbilical hernia    Essential hypertension with goal blood pressure less than 140/90  Controlled.  Low salt, low fat diet.   Exercise as tolerated.  Take meds as prescribed; call if with side effects.    - EKG 12-lead complete w/read - Clinics  - NM Lexiscan stress test    Hyperlipidemia LDL goal <130  Reinforced heart healthy lifestyle.   - EKG 12-lead complete w/read - Clinics  - NM Lexiscan stress test    Benign prostatic hyperplasia with incomplete bladder emptying  Due to patient c/o increased LUTS, referred back to urology.  Continue tamsulosin.  - Adult Urology  Referral    Abnormal finding on EKG  - EKG 12-lead complete w/read - Clinics  - NM Lexiscan stress test    LBBB (left bundle branch block)  New finding on today's EKG.  Asymptomatic today but patient reports more LEONARD uphill in the last 2 yrs.  Try to r/o occult CAD or other cardiac dysfunction.  Refer to cardiology as  appropriate.  - NM Lexiscan stress test              - No identified additional risk factors other than previously addressed    Preoperative Medication Instructions  Antiplatelet or Anticoagulation Medication Instructions   - aspirin: Discontinue aspirin 7 days prior to procedure to reduce bleeding risk. It should be resumed postoperatively.     Additional Medication Instructions  Take all scheduled medications on the day of surgery EXCEPT for modifications listed below:  Hold flonase and triamcinolone cream on the day of surgery.    Do not take Ibuprofen, Aspirin or Naproxen 7 days before procedure  If you need to take something for pain, take Acetaminophen 325 mg orally 1-2 tabs every 4-6 hrs as needed for pain     Referrals to urology has been signed. Schedulers will call you in the next 3-5 business days.     Recommendation  Patient referred to cardiac imaging for evaluation before surgery. Surgery approval pending completion of imaging/consultation.    Follow-up   Return in 1 week (on 4/23/2025) for In-clinic visit for if you develop new symptoms.        Klaus Feng is a 79 year old, presenting for the following:  Pre-Op Exam          4/16/2025    10:04 AM   Additional Questions   Roomed by Meron ANGUIANO   Accompanied by self     HPI: Patient has umbilical hernia with mild pain intermittently. Hence, planned surgery. Reviewed above with patient.           4/16/2025   Pre-Op Questionnaire   Have you ever had a heart attack or stroke? No    Have you ever had surgery on your heart or blood vessels, such as a stent placement, a coronary artery bypass, or surgery on an artery in your head, neck, heart, or legs? No    Do you have chest pain with activity? No    Do you have a history of heart failure? No    Do you currently have a cold, bronchitis or symptoms of other infection? No    Do you have a cough, shortness of breath, or wheezing? No    Do you or anyone in your family have previous history of blood clots? No     Do you or does anyone in your family have a serious bleeding problem such as prolonged bleeding following surgeries or cuts? No    Have you ever had problems with anemia or been told to take iron pills? No    Have you had any abnormal blood loss such as black, tarry or bloody stools? No    Have you ever had a blood transfusion? No    Are you willing to have a blood transfusion if it is medically needed before, during, or after your surgery? Yes    Have you or any of your relatives ever had problems with anesthesia? No    Do you have sleep apnea, excessive snoring or daytime drowsiness? No    Do you have any artifical heart valves or other implanted medical devices like a pacemaker, defibrillator, or continuous glucose monitor? No    Do you have artificial joints? No    Are you allergic to latex? No        Proxy-reported     Advance Care Planning    Document on file is a Health Care Directive or POLST.    Preoperative Review of    reviewed - no record of controlled substances prescribed.      Status of Chronic Conditions:  HYPERLIPIDEMIA - Patient has a long history of significant Hyperlipidemia requiring medication for treatment with recent good control. Patient reports no problems or side effects with the medication.     HYPERTENSION - Patient has longstanding history of HTN , currently denies any symptoms referable to elevated blood pressure. Specifically denies chest pain, palpitations, dyspnea, orthopnea, PND or peripheral edema. Blood pressure readings have been in normal range. Current medication regimen is as listed below. Patient denies any side effects of medication.     Patient Active Problem List    Diagnosis Date Noted    Family history of rectal cancer 08/12/2020     Priority: Medium     Mother and daughter      Chronic vasomotor rhinitis 07/24/2017     Priority: Medium    Essential hypertension with goal blood pressure less than 140/90 07/22/2016     Priority: Medium    Dermatitis 07/22/2016      Priority: Medium    Family history of malignant neoplasm of breast 05/19/2016     Priority: Medium     Sister, daughter, and 4 nieces.       Diverticulosis of large intestine 08/24/2015     Priority: Medium     Scope in 2011.      Family history of colon cancer 08/24/2015     Priority: Medium     Mother. Needs colonoscopy every 5 years.       Herpes zoster 07/30/2012     Priority: Medium     right lower extremity        Hyperlipidemia LDL goal <130 10/31/2010     Priority: Medium    Benign neoplasm of colon 11/09/2007     Priority: Low     Tubular adenoma-two noted on 2007 colonoscopy. diverticulosis otherwise normal colonoscopy 11/11        No past medical history on file.  Past Surgical History:   Procedure Laterality Date    COLONOSCOPY  2001    negative     COLONOSCOPY  12/13/2011    Procedure:COLONOSCOPY; Colonoscopy; Surgeon:DARCIE FISHER; Location:[facility]    COLONOSCOPY N/A 8/10/2016    Procedure: COLONOSCOPY;  Surgeon: Morales Renteria MD;  Location: [facility]    COLONOSCOPY N/A 10/5/2023    Procedure: COLONOSCOPY, WITH POLYPECTOMY AND BIOPSY;  Surgeon: Emmanuel Kaiser MD;  Location: [facility]    LAPAROSCOPIC HERNIORRHAPHY INGUINAL BILATERAL Bilateral 4/16/2019    Procedure: Laparoscopic bilateral inguinal hernia repair and open umbilical hernia repair;  Surgeon: Mckinley Colindres MD;  Location: [facility]     Current Outpatient Medications   Medication Sig Dispense Refill    ASPIRIN 81 MG OR TABS 1 TABLET DAILY      atenolol (TENORMIN) 25 MG tablet Take 1 tablet (25 mg) by mouth daily 90 tablet 3    fluticasone (FLONASE) 50 MCG/ACT nasal spray Spray 1-2 sprays into both nostrils daily as needed for rhinitis 51 g 3    lovastatin (MEVACOR) 40 MG tablet Take 1 tablet (40 mg) by mouth at bedtime 90 tablet 3    tamsulosin (FLOMAX) 0.4 MG capsule Take 1 capsule (0.4 mg) by mouth daily 90 capsule 3    triamcinolone (ARISTOCORT HP) 0.5 % external cream Apply topically twice a day as needed to affected area. Call to  "refill. 30 g 3       Allergies   Allergen Reactions    Nkda [No Known Drug Allergy]         Social History     Tobacco Use    Smoking status: Former     Current packs/day: 0.00     Average packs/day: 1 pack/day for 7.0 years (7.0 ttl pk-yrs)     Types: Cigarettes     Start date: 1961     Quit date: 1968     Years since quittin.4    Smokeless tobacco: Never   Substance Use Topics    Alcohol use: Yes     Comment: minimal     Family History   Problem Relation Age of Onset    Hypertension Mother     Cerebrovascular Disease Mother          of a stroke    Cancer - colorectal Mother         age 70s    Prostate Cancer Father         had prostate removed    Cancer Brother         bladder cancer    Diabetes Maternal Grandmother     Colon Cancer Daughter 53        colon cancer surger    Breast Cancer Daughter     Asthma No family hx of     C.A.D. No family hx of     Melanoma No family hx of      History   Drug Use No             Review of Systems  Constitutional, HEENT, cardiovascular, pulmonary, GI, , musculoskeletal, neuro, skin, endocrine and psych systems are negative, except as otherwise noted.    Objective    /72 (BP Location: Right arm, Patient Position: Chair, Cuff Size: Adult Regular)   Pulse 78   Temp 97.3  F (36.3  C) (Tympanic)   Resp 20   Ht 1.734 m (5' 8.25\")   Wt 87.6 kg (193 lb 3.2 oz)   SpO2 95%   BMI 29.16 kg/m     Estimated body mass index is 29.16 kg/m  as calculated from the following:    Height as of this encounter: 1.734 m (5' 8.25\").    Weight as of this encounter: 87.6 kg (193 lb 3.2 oz).  Physical Exam  GENERAL APPEARANCE: healthy, alert and no distress; ambulatory w/o assist  EYES: pink conj, no icterus, PERRL, EOMI  HENT: ear canals and TM's normal, nose and mouth without ulcers or lesions, oropharynx clear and oral mucous membranes moist  NECK: no adenopathy, no asymmetry, masses, or scars and thyroid normal to palpation  RESP: lungs clear to auscultation - no " rales, rhonchi or wheezes  CV: regular rates and rhythm, normal S1 S2, no S3 or S4, no murmur, click or rub, no peripheral edema and peripheral pulses strong  ABDOMEN: soft, nontender, no hepatosplenomegaly, no masses and bowel sounds normal  MS: no musculoskeletal defects are noted and gait is age appropriate without ataxia  SKIN: good turgor, no rash/jaundice/ecchymosis  NEURO: Normal strength and tone, sensory exam grossly normal, mentation intact and speech normal     Recent Labs   Lab Test 06/27/24  1109      POTASSIUM 4.8   CR 0.91        Diagnostics  No results found for this or any previous visit (from the past 24 hours).   EKG required for previous LAE on EKG and patient's age and not completed in the last 90 days.   EKG: sinus rhythm, normal axis, normal intervals, no acute ST/T changes c/w ischemia, no LVH by voltage criteria, Left Bundle Branch Block new compared to previous EKG    Revised Cardiac Risk Index (RCRI)  The patient has the following serious cardiovascular risks for perioperative complications:   - No serious cardiac risks = 0 points     RCRI Interpretation: 0 points: Class I (very low risk - 0.4% complication rate)         Signed Electronically by: Uvaldo Olivera MD  A copy of this evaluation report is provided to the requesting physician.

## 2025-04-29 ENCOUNTER — OFFICE VISIT (OUTPATIENT)
Dept: UROLOGY | Facility: CLINIC | Age: 80
End: 2025-04-29
Payer: COMMERCIAL

## 2025-04-29 ENCOUNTER — ALLIED HEALTH/NURSE VISIT (OUTPATIENT)
Dept: UROLOGY | Facility: CLINIC | Age: 80
End: 2025-04-29
Payer: COMMERCIAL

## 2025-04-29 VITALS — HEART RATE: 77 BPM | RESPIRATION RATE: 16 BRPM | DIASTOLIC BLOOD PRESSURE: 78 MMHG | SYSTOLIC BLOOD PRESSURE: 143 MMHG

## 2025-04-29 DIAGNOSIS — R39.14 BENIGN PROSTATIC HYPERPLASIA WITH INCOMPLETE BLADDER EMPTYING: Primary | ICD-10-CM

## 2025-04-29 DIAGNOSIS — N40.1 BENIGN PROSTATIC HYPERPLASIA WITH INCOMPLETE BLADDER EMPTYING: Primary | ICD-10-CM

## 2025-04-29 RX ORDER — FINASTERIDE 5 MG/1
5 TABLET, FILM COATED ORAL DAILY
Qty: 30 TABLET | Refills: 1 | Status: SHIPPED | OUTPATIENT
Start: 2025-04-29

## 2025-04-29 NOTE — NURSING NOTE
"Initial BP (!) 143/78 (BP Location: Right arm, Patient Position: Chair, Cuff Size: Adult Regular)   Pulse 77   Resp 16  Estimated body mass index is 29.16 kg/m  as calculated from the following:    Height as of 4/16/25: 1.734 m (5' 8.25\").    Weight as of 4/16/25: 87.6 kg (193 lb 3.2 oz). .  Patient is here for a cysto.  dinh traore LPN        "

## 2025-04-29 NOTE — PROGRESS NOTES
UROLOGY OUTPATIENT VISIT      Chief Complaint:   Weak urinary stream      Synopsis   Velasquez Del Rio is a very pleasant AGE: 79 year old year old person     Mr. Velasquez Del Rio has followed with urology for urinary urgency, hesitancy, slow stream, nocturia, intermittently straining to void, and sensation of incomplete emptying. He was started on tamsulosin 0.4 mg daily.   He reports urinary hesitancy, particularly when is less active, weak stream, urinary frequency, and urgency. He reports nocturia x 4-5. He reports some urge incontinence. He denies dysuria and gross hematuria.   Bladder Scan = 207 ml  Experiencing frequency and urgency especially at night      Incomplete emptying: How often have you had the sensation of not emptying your bladder? --> 4 = More than half the time  Frequency: How often have you had to urinate less than every two hours? --> 5 = Almost always  Intermittency: How often have you found&nbsp;you stopped and started again several times when you urinated? --> 5 = Almost always  Urgency: How often have you found it difficult to postpone urination? --> 5 = Almost always  Weak stream: How often have you had a weak urinary stream? --> 5 = Almost always  Straining: How often have you had to strain to start urination? --> 5 = Almost always  Nocturia: How many times do you typically get up at night to urinate? --> 5 = >=5 times  If you were to spend the rest of your life with your urinary condition just the way it is now, how would you feel about that? --> 6 = Unhappy        Imaging   I personally reviewed the CT scan and by my interpretation it demonstrates prostate around 70 grams on CT from 2018.    The following distinct labs were reviewed   Most Recent 3 CBC's:  Recent Labs   Lab Test 04/11/19  1116   WBC 6.3   HGB 15.3   MCV 91        Most Recent 3 PSA:  Recent Labs   Lab Test 06/27/24  1109 06/26/23  1010 07/25/22  0913   PSA 4.81 4.37 2.76     Most Recent Urinalysis:  Recent Labs    Lab Test 05/27/22  1438   COLOR Yellow   APPEARANCE Clear   URINEGLC Negative   URINEBILI Negative   URINEKETONE Negative   SG >=1.030   UBLD Negative   URINEPH 5.5   PROTEIN Negative   UROBILINOGEN 0.2   NITRITE Negative   LEUKEST Negative   RBCU None Seen   WBCU None Seen       Medical Comorbidities    No past medical history on file.            Medications     Current Outpatient Medications   Medication Sig Dispense Refill    ASPIRIN 81 MG OR TABS 1 TABLET DAILY      atenolol (TENORMIN) 25 MG tablet Take 1 tablet (25 mg) by mouth daily 90 tablet 3    lovastatin (MEVACOR) 40 MG tablet Take 1 tablet (40 mg) by mouth at bedtime 90 tablet 3    tamsulosin (FLOMAX) 0.4 MG capsule Take 1 capsule (0.4 mg) by mouth daily 90 capsule 3    fluticasone (FLONASE) 50 MCG/ACT nasal spray Spray 1-2 sprays into both nostrils daily as needed for rhinitis 51 g 3    triamcinolone (ARISTOCORT HP) 0.5 % external cream Apply topically twice a day as needed to affected area. Call to refill. 30 g 3     No current facility-administered medications for this visit.         CYSTOSCOPY  We discussed the risks and benefits of the procedure which include risk of bleeding and infection.  Informed consent was obtained, the patient was prepped and draped in the standard sterile fashion.  A flexible cystoscope was introduced through a well-lubricated urethra.     Anterior urethra strictures were absent.   The urinary sphincter was intact.  The prostate demonstrated trilobar hypertrophy.  Bladder neck was open.   Bladder signififcant for the following:      Diverticuli: No      Cellules: Yes      Trabeculation: severe       Tumors: No      Stones:No  The ureteral orifices  were identified on each side in orthotopic position  On retroflexion there was the usual bladder neck hyperemia.  There moderate intravesical protrusion of the prostate.      The flexible cystoscope was removed and the findings were described to the patient.                  Assessment and Plan         Assessment/Plan   Velasquez Del Rio is a very pleasant AGE: 79 year old year old person with BPH.LUTS    BPH and LUTS  Patient with longstanding history of lower urinary tract symptoms for almost 5 to 10 years.  Tamsulosin is no longer working well for him.  He has bothersome urinary symptoms and he has incomplete emptying with postvoid residual around 200.  When I did the cystoscopy on him today it shows signs of obstruction and while he just urinated his bladder was still quite full.  He has severe trabeculations in the bladder.  I did discuss these findings with him.  My TRUS sizing is showing a prostate somewhere between 40 to 50 g but CT shows maybe around 70 g from few years ago.  I wonder if the TRUS sizing was underestimating.  I did talk to him about different treatment options for this.    We discussed the available surgical treatment options for BPH and went over the risk/benefit profile of each including retrograde ejaculation, bleeding, infection, damage to the urethra, prostate and bladder, urinary incontinence, pelvic pain, identification of incidental prostate cancer and need for additional intervention.    We discussed that minimally invasive treatment (MIST) options with the lowest likelihood of side effects though these treatments are relatively new and long term data regarding durability is limited.  Additionally, functional improvement is less likely to be as dramatic relative to more invasive procedures.    We also discussed the transurethral tissue removing procedures including TURP, Greenlight PVP, and HoLEP and discussed that these procedures carry higher perioperative risk profiles but greater degree of symptom improvement and likely increased durability.  Additionally, we discussed that some degree of post-operative urinary leakage and frequency is common after these procedures though often is limited to the first few months of recovery.    I think  given the severity of his trabeculation and the incomplete emptying I do not think a minimally invasive treatment option is good for him as it would not provide the durability or the results that he seeks.  I think an resection procedure would be better.  I did discuss that his nocturia and urgency could worsen but should improve over time this is typically my experience as the overactive bladder component tends to improve after the outlet is addressed but may take up to 6 months for that to get better.  In that time.  He may have some level of incontinence.  He understands this and wants to proceed.  I have sent him some information to read about the procedure.  Will plan to proceed with a laser nucleation of the prostate either at [facility] or [facility].      CC:  Uvaldo Olivera    Additional Coding Information:  Problems:  4 -- one or more chronic illnesses with exacerbation or side effects    Level of risk:  4 -- moderate risk surgery

## 2025-04-30 ENCOUNTER — DOCUMENTATION ONLY (OUTPATIENT)
Dept: CARDIOLOGY | Facility: CLINIC | Age: 80
End: 2025-04-30

## 2025-04-30 ENCOUNTER — HOSPITAL ENCOUNTER (OUTPATIENT)
Dept: NUCLEAR MEDICINE | Facility: CLINIC | Age: 80
Setting detail: NUCLEAR MEDICINE
Discharge: HOME | End: 2025-04-30
Attending: FAMILY MEDICINE
Payer: COMMERCIAL

## 2025-04-30 ENCOUNTER — TELEPHONE (OUTPATIENT)
Dept: UROLOGY | Facility: CLINIC | Age: 80
End: 2025-04-30

## 2025-04-30 ENCOUNTER — HOSPITAL ENCOUNTER (OUTPATIENT)
Dept: CARDIOLOGY | Facility: CLINIC | Age: 80
Setting detail: NUCLEAR MEDICINE
Discharge: HOME | End: 2025-04-30
Attending: FAMILY MEDICINE
Payer: COMMERCIAL

## 2025-04-30 DIAGNOSIS — I10 ESSENTIAL HYPERTENSION WITH GOAL BLOOD PRESSURE LESS THAN 140/90: ICD-10-CM

## 2025-04-30 DIAGNOSIS — R94.39 ABNORMAL NUCLEAR STRESS TEST: ICD-10-CM

## 2025-04-30 DIAGNOSIS — R93.1 DEPRESSED LEFT VENTRICULAR EJECTION FRACTION: ICD-10-CM

## 2025-04-30 DIAGNOSIS — R94.31 ABNORMAL FINDING ON EKG: ICD-10-CM

## 2025-04-30 DIAGNOSIS — Z01.818 PREOP GENERAL PHYSICAL EXAM: ICD-10-CM

## 2025-04-30 DIAGNOSIS — E78.5 HYPERLIPIDEMIA LDL GOAL <130: ICD-10-CM

## 2025-04-30 DIAGNOSIS — I44.7 LBBB (LEFT BUNDLE BRANCH BLOCK): ICD-10-CM

## 2025-04-30 DIAGNOSIS — I44.7 LBBB (LEFT BUNDLE BRANCH BLOCK): Primary | ICD-10-CM

## 2025-04-30 LAB
CV STRESS MAX HR HE: 76
NUC STRESS EJECTION FRACTION: 32 %
RATE PRESSURE PRODUCT: NORMAL
STRESS ECHO BASELINE DIASTOLIC HE: 79
STRESS ECHO BASELINE HR: 55 BPM
STRESS ECHO BASELINE SYSTOLIC BP: 124
STRESS ECHO CALCULATED PERCENT HR: 55 %
STRESS ECHO LAST STRESS DIASTOLIC BP: 79
STRESS ECHO LAST STRESS SYSTOLIC BP: 134
STRESS ECHO TARGET HR: 139

## 2025-04-30 PROCEDURE — 78452 HT MUSCLE IMAGE SPECT MULT: CPT

## 2025-04-30 PROCEDURE — 93017 CV STRESS TEST TRACING ONLY: CPT

## 2025-04-30 PROCEDURE — 343N000001 HC RX 343 MED OP 636: Performed by: FAMILY MEDICINE

## 2025-04-30 PROCEDURE — 93016 CV STRESS TEST SUPVJ ONLY: CPT | Performed by: INTERNAL MEDICINE

## 2025-04-30 PROCEDURE — 250N000011 HC RX IP 250 OP 636: Mod: JZ | Performed by: FAMILY MEDICINE

## 2025-04-30 PROCEDURE — A9502 TC99M TETROFOSMIN: HCPCS | Performed by: FAMILY MEDICINE

## 2025-04-30 RX ORDER — REGADENOSON 0.08 MG/ML
0.4 INJECTION, SOLUTION INTRAVENOUS ONCE
Status: COMPLETED | OUTPATIENT
Start: 2025-04-30 | End: 2025-04-30

## 2025-04-30 RX ADMIN — TETROFOSMIN 9.7 MILLICURIE: 1.38 INJECTION, POWDER, LYOPHILIZED, FOR SOLUTION INTRAVENOUS at 07:45

## 2025-04-30 RX ADMIN — TETROFOSMIN 31.6 MILLICURIE: 1.38 INJECTION, POWDER, LYOPHILIZED, FOR SOLUTION INTRAVENOUS at 09:30

## 2025-04-30 RX ADMIN — REGADENOSON 0.4 MG: 0.08 INJECTION, SOLUTION INTRAVENOUS at 09:28

## 2025-04-30 NOTE — PROGRESS NOTES
Jung was in for a stress test this morning. His baseline EKG showed a LBBB with atrial fibrillation as the primary underlying rhythm. Based on a quick chart review, it appears the atrial fibrillation is a new finding. We did end up doing the test and consulted Dr. Mitchell who was supervising for us today. She recommended that post test results, he schedule a follow up as soon as possible so this can get worked up further in case he needs to be anticoagulated.

## 2025-04-30 NOTE — RESULT ENCOUNTER NOTE
Please call patient to make sure he gets this message this afternoon. [employer] message is also sent to patient.    Lexiscan stress test is abnormal showing:  - signs of reduced coronary blood flow on two areas.  - reduced pumping force of the left ventricle (lower chamber)    Due to this an urgent referral to cardiology is signed. The  should be contacting you within 24 hours.    If you have any chest discomfort now or are feeling even a little short of breath, or if your heart rate is more than 100 beats per minute and/or irregular, you should be brought to the ER now.

## 2025-04-30 NOTE — RESULT ENCOUNTER NOTE
His lexiscan returned abnormal today and is referred to cardiology for further management. I recommend his surgery be postponed until cardiology completes treatment and clears him for an elective procedure. Let me know if you have any other question.

## 2025-05-01 ENCOUNTER — OFFICE VISIT (OUTPATIENT)
Dept: CARDIOLOGY | Facility: CLINIC | Age: 80
End: 2025-05-01
Attending: FAMILY MEDICINE
Payer: COMMERCIAL

## 2025-05-01 ENCOUNTER — HOSPITAL ENCOUNTER (OUTPATIENT)
Dept: CARDIOLOGY | Facility: CLINIC | Age: 80
Discharge: HOME | End: 2025-05-01
Attending: INTERNAL MEDICINE
Payer: COMMERCIAL

## 2025-05-01 ENCOUNTER — OFFICE VISIT (OUTPATIENT)
Dept: FAMILY MEDICINE | Facility: CLINIC | Age: 80
End: 2025-05-01
Payer: COMMERCIAL

## 2025-05-01 ENCOUNTER — LAB (OUTPATIENT)
Dept: LAB | Facility: CLINIC | Age: 80
End: 2025-05-01
Payer: COMMERCIAL

## 2025-05-01 VITALS
DIASTOLIC BLOOD PRESSURE: 69 MMHG | OXYGEN SATURATION: 96 % | HEART RATE: 72 BPM | RESPIRATION RATE: 16 BRPM | SYSTOLIC BLOOD PRESSURE: 144 MMHG

## 2025-05-01 VITALS
RESPIRATION RATE: 20 BRPM | OXYGEN SATURATION: 96 % | HEIGHT: 68 IN | SYSTOLIC BLOOD PRESSURE: 124 MMHG | HEART RATE: 63 BPM | WEIGHT: 195 LBS | BODY MASS INDEX: 29.55 KG/M2 | DIASTOLIC BLOOD PRESSURE: 68 MMHG | TEMPERATURE: 97.4 F

## 2025-05-01 DIAGNOSIS — R93.1 DEPRESSED LEFT VENTRICULAR EJECTION FRACTION: ICD-10-CM

## 2025-05-01 DIAGNOSIS — I50.20 HEART FAILURE WITH REDUCED EJECTION FRACTION (H): ICD-10-CM

## 2025-05-01 DIAGNOSIS — I44.7 LBBB (LEFT BUNDLE BRANCH BLOCK): ICD-10-CM

## 2025-05-01 DIAGNOSIS — I25.10 CORONARY ARTERY DISEASE INVOLVING NATIVE CORONARY ARTERY OF NATIVE HEART WITHOUT ANGINA PECTORIS: Primary | ICD-10-CM

## 2025-05-01 DIAGNOSIS — I48.0 PAROXYSMAL ATRIAL FIBRILLATION (H): Primary | ICD-10-CM

## 2025-05-01 DIAGNOSIS — R39.14 BENIGN PROSTATIC HYPERPLASIA WITH INCOMPLETE BLADDER EMPTYING: ICD-10-CM

## 2025-05-01 DIAGNOSIS — R94.39 ABNORMAL NUCLEAR STRESS TEST: ICD-10-CM

## 2025-05-01 DIAGNOSIS — R94.39 ABNORMAL NUCLEAR STRESS TEST: Primary | ICD-10-CM

## 2025-05-01 DIAGNOSIS — I48.0 PAROXYSMAL ATRIAL FIBRILLATION (H): ICD-10-CM

## 2025-05-01 DIAGNOSIS — I42.9 CARDIOMYOPATHY, UNSPECIFIED TYPE (H): ICD-10-CM

## 2025-05-01 DIAGNOSIS — N40.1 BENIGN PROSTATIC HYPERPLASIA WITH INCOMPLETE BLADDER EMPTYING: ICD-10-CM

## 2025-05-01 DIAGNOSIS — I25.10 CORONARY ARTERY DISEASE INVOLVING NATIVE CORONARY ARTERY OF NATIVE HEART WITHOUT ANGINA PECTORIS: ICD-10-CM

## 2025-05-01 LAB
ERYTHROCYTE [DISTWIDTH] IN BLOOD BY AUTOMATED COUNT: 12.3 % (ref 10–15)
HCT VFR BLD AUTO: 41.7 % (ref 40–53)
HGB BLD-MCNC: 14.1 G/DL (ref 13.3–17.7)
LVEF ECHO: NORMAL
MCH RBC QN AUTO: 30.8 PG (ref 26.5–33)
MCHC RBC AUTO-ENTMCNC: 33.8 G/DL (ref 31.5–36.5)
MCV RBC AUTO: 91 FL (ref 78–100)
PLATELET # BLD AUTO: 187 10E3/UL (ref 150–450)
RBC # BLD AUTO: 4.58 10E6/UL (ref 4.4–5.9)
WBC # BLD AUTO: 6.6 10E3/UL (ref 4–11)

## 2025-05-01 PROCEDURE — 999N000208 ECHOCARDIOGRAM COMPLETE

## 2025-05-01 PROCEDURE — 255N000002 HC RX 255 OP 636: Performed by: INTERNAL MEDICINE

## 2025-05-01 RX ORDER — SODIUM CHLORIDE 9 MG/ML
INJECTION, SOLUTION INTRAVENOUS CONTINUOUS
OUTPATIENT
Start: 2025-05-01

## 2025-05-01 RX ORDER — LIDOCAINE 40 MG/G
CREAM TOPICAL
OUTPATIENT
Start: 2025-05-01

## 2025-05-01 RX ORDER — LOSARTAN POTASSIUM 25 MG/1
25 TABLET ORAL DAILY
Qty: 90 TABLET | Refills: 3 | Status: SHIPPED | OUTPATIENT
Start: 2025-05-01

## 2025-05-01 RX ADMIN — HUMAN ALBUMIN MICROSPHERES AND PERFLUTREN 2 ML: 10; .22 INJECTION, SOLUTION INTRAVENOUS at 10:11

## 2025-05-01 ASSESSMENT — PAIN SCALES - GENERAL: PAINLEVEL_OUTOF10: NO PAIN (0)

## 2025-05-01 NOTE — PATIENT INSTRUCTIONS
Continue recommendations from cardiology.  Activity as tolerated.    If you have new  chest pain, persistent or worsening breathing difficulty, palpitation, lightheadedness or other concerning symptoms, you should be brought to the ER.     Postpone hernia repair and prostate procedure until heart condition is stabilized.

## 2025-05-01 NOTE — PATIENT INSTRUCTIONS
"Start Losartan 25mg 1 tablet a day   Start Eliquis 5mg 1 tablet twice a day     Coronary angiogram   [facility] [place]      Please call clinic with any new COVID like symptoms prior to procedure.    2.   Coronary angiogram to be done at [facility] on 5/16/25 . Please arrive at 8:30am . If you need to contact [facility] for any reason, please call [phone] option #2.    Call the [facility] Nurse line with any questions [phone] Thea RN, Pat RN; Shae RN    In preparation for your procedure, we require that you do the following:    Please take a shower the night before and morning of your procedure with regular soap.    NO solid foods 8 hours prior to arrival and may have clear liquids up to 2 hours prior to arrival.    Take your usual morning medications with a small sip of water immediately upon arising on the morning of your procedure unless outlined below:    Aspirin:  If you are currently taking 81mg aspirin daily, please take a total of 4 tablets (325 mg)  the morning of procedure    Eliquis  STOP taking this medication 2 days prior to procedure Take your last dose on 5/13/25    Expect 2-6 hours bedrest post procedure dependent on access site used for procedure.  This bedrest is to allow proper clotting of the access site to prevent bleeding.    You will be unable to drive after your procedure; please arrange to have someone drive you home. Make sure that there is a responsible adult with you for 24 hours after your procedure. Your procedure will be cancelled if you do not have transportation home or someone staying with you for 24 hrs.    Your procedure will be done at [facility] located at [address] [place] [id number]. Please park in the  [address]amp  on the west side of [facility] on [address]. Take the skyway over [facility] to the hospital. Please check in on the first floor, \"Skyway Welcome Desk\" which is one floor down " from the skyway level.    If you have any questions about your upcoming procedure, please contact nursing at [facility] at [phone] or at [facility] at [phone].

## 2025-05-01 NOTE — LETTER
5/1/2025    Uvaldo Olivera MD  [address] [facility] [address]    RE: Velasquez PEARCE Eliane       Dear Colleague,     I had the pleasure of seeing Velasuqez PEARCE Eliane in the [facility].      Cardiology Consultation     Assessment & Plan    1.  Abnormal stress test as noted below  2.  Left bundle branch block.  EKG normal sinus rhythm in 2019  3.  New cardiomyopathy by stress test  4.  Hypertension  5.  Hyperlipidemia  6.  A-fib noted on stress test EKG and echocardiogram.  7.  New cardiomyopathy likely ischemic in nature  8.  BPH being seen by urology with possible procedures planned in the future    Recommendations    1.  Cardiomyopathy: Likely ischemic based on the available data.  He had a normal QRS in 2019 on EKG however recent study has suggested left bundle branch block again going with his cardiomyopathy.  Atrial fibrillation also is a new diagnosis for him.  I will add losartan to his atenolol.  He will benefit from having coronary angiography performed.  We discussed the risk benefits of potential complications and he is wanting to proceed.  We will arrange for this as an outpatient on May 16, 2025 with me.  We also discussed that he may have multivessel coronary artery disease and we may recommend consideration for bypass surgery.    2.  Atrial fibrillation: New diagnosis.  We will start him on Eliquis and he should continue his low-dose aspirin as well.  Continue with atenolol for rate control    3.  Patient has an important graduation to attend.  He states that his grandson will be graduating from the [employer] medical school next week.  Therefore we will delay our angiogram till the following week.    4.  Thank you kindly for consult we will make adjustments in his medical regimen once we know the data from the coronary angiogram.  He also was told that the findings will likely delay his ability to undergo his hernia surgery and or prostate as well.      The  longitudinal plan of care for the diagnosis(es)/condition(s) as documented were addressed during this visit. Due to the added complexity in care, I will continue to support Jung in the subsequent management and with ongoing continuity of care.           Johana Miller MD, MD        History of present illness    Patient is a pleasant 79-year-old gentleman who presents to establish local cardiac care.  In preparation for a hernia surgery and possibly prostate surgery patient underwent an EKG demonstrating sinus rhythm with a left bundle branch.  This led to a stress test.  During the stress EKG he was noted again to have his left bundle branch block however atrial fibrillation was present.  The stress test was notable for a cardiomyopathy as well as suggestion of prior MI.  Please see below for full details.  An echocardiogram was performed today again confirming no significant valvular heart disease however again a cardiomyopathy with wall motion normalities in the anterior apical and inferior apical segments.  Full report has not been read and we are awaiting the final findings.    Clinically he reports of not feeling the atrial fibrillation.  He reports of no palpitations.  He does note for the last year or 2 a slight decline in his typical fitness level.  He just came back from [place] where he bikes on level ground as well as hiking small mountains.  He has noticed that he has had to stop several times which he has not had in the past.  He reports of no chest pain PND orthopnea lower extremity edema or any other cardiac related concerns.  And again in the background he will need to have possible umbilical hernia surgery and possibly prostate surgery in the future.  Here for establishment of care          MPI:     The nuclear stress test is abnormal.     There is nontransmural infarction in the mid to distal apical, anterior and anteroseptal segment(s) of the left ventricle associated with a mild degree of  eleno-infarct ischemia.     There is nontransmural infarction in the inferior and inferoseptal segment(s) of the left ventricle associated with a mild degree of eleno-infarct ischemia.     Left ventricular function is moderately reduced.     The left ventricular ejection fraction at stress is 32%.     There is no prior study for comparison            Primary Care Physician  Uvaldo Olivera        Patient Active Problem List   Diagnosis     Benign neoplasm of colon     Hyperlipidemia LDL goal <130     Herpes zoster     Diverticulosis of large intestine     Family history of colon cancer     Family history of malignant neoplasm of breast     Essential hypertension with goal blood pressure less than 140/90     Dermatitis     Chronic vasomotor rhinitis     Family history of rectal cancer       Past Medical History  I have reviewed this patient's medical history and updated it with pertinent information if needed.   No past medical history on file.    Past Surgical History  I have reviewed this patient's surgical history and updated it with pertinent information if needed.  Past Surgical History:   Procedure Laterality Date     COLONOSCOPY  2001    negative      COLONOSCOPY  12/13/2011    Procedure:COLONOSCOPY; Colonoscopy; Surgeon:DARCIE FISHER; Location:[facility]     COLONOSCOPY N/A 8/10/2016    Procedure: COLONOSCOPY;  Surgeon: Morales Renteria MD;  Location: [facility]     COLONOSCOPY N/A 10/5/2023    Procedure: COLONOSCOPY, WITH POLYPECTOMY AND BIOPSY;  Surgeon: Emmanuel Kaiser MD;  Location: [facility]     LAPAROSCOPIC HERNIORRHAPHY INGUINAL BILATERAL Bilateral 4/16/2019    Procedure: Laparoscopic bilateral inguinal hernia repair and open umbilical hernia repair;  Surgeon: Mckinley Colindres MD;  Location: [facility]       Prior to Admission Medications  Cannot display prior to admission medications because the patient has not been admitted in this contact.     [unfilled]  [unfilled]  Allergies  Allergies   Allergen  Reactions     Nkda [No Known Drug Allergy]        Social History   reports that he quit smoking about 56 years ago. His smoking use included cigarettes. He started smoking about 63 years ago. He has a 7 pack-year smoking history. He has never used smokeless tobacco. He reports current alcohol use. He reports that he does not use drugs.    Family History  Family History   Problem Relation Age of Onset     Hypertension Mother      Cerebrovascular Disease Mother          of a stroke     Cancer - colorectal Mother         age 70s     Prostate Cancer Father         had prostate removed     Cancer Brother         bladder cancer     Diabetes Maternal Grandmother      Colon Cancer Daughter 53        colon cancer surger     Breast Cancer Daughter      Asthma No family hx of      C.A.D. No family hx of      Melanoma No family hx of        Review of Systems  The comprehensive 10 point Review of Systems is negative other than noted in the HPI or here.     Physical Exam  Vital Signs with Ranges     Wt Readings from Last 4 Encounters:   25 87.6 kg (193 lb 3.2 oz)   24 85.3 kg (188 lb)   24 85.5 kg (188 lb 6.4 oz)   24 83.5 kg (184 lb)     [unfilled]      Vitals: There were no vitals taken for this visit.        @LABRCNTIPR(tropi:5,troponinies:5)@    @LABRCNTIPR(wbc:3,hgb:3,mcv:3,plt:3,inr:3,na:3,potassium:3,chloride:3,co2:3,bun:3,cr:3,gfrestimated:3,gfrestblack:3,aniongap:3,rich:3,glc:3,albumin:2,prottotal:2,bilitotal:2,alkphos:2,alt:2,ast:2,lipase:2,tropi:3)@  Recent Labs   Lab Test 24  1109 23  1010   CHOL 175 158   HDL 50 48   * 84   TRIG 103 131  "    @LABRCNTIP(wbc:3,hgb:3,hct:3,mcv:3,plt:3,iron:3,ironsat:3,reticabsct:3,retp:3,feb:3,km:3,b12:3,folic:3,epoe:3,morph:3)@  @LABRCNTIP(PH:3,PHV:3,PO2:3,PO2V:3,sat:3,PCO2:3,PCO2V:3,HCO3:3,HCO3V:3)@  @LABRCNTIP(NTBNPI:3,NTBNP:3)@  @LABRCNTIP(DD:1)@  @LABRCNTIP(sed:3,crp:3)@  @LABRCNTIP(PLT:3)@  @LABRCNTIP(TSH:3)@  @LABRCNTIP(color:1,appearance:1,urineg,urinebili:1,urineketone:1,s,ubld:1,urineph:1,protein:1,urobilinogen:1,nitrite:1,leukest:1,rbcu:1,wbcu:1)@    Imaging:  Recent Results (from the past 48 hours)   NM Lexiscan stress test   Result Value    Target     Baseline Systolic     Baseline Diastolic BP 79    Last Stress Systolic     Last Stress Diastolic BP 79    Baseline HR 55    Max HR  76    Max Predicted HR  55    Rate Pressure Product 10,184.0    Left Ventricular EF 32    Narrative       The nuclear stress test is abnormal.     There is nontransmural infarction in the mid to distal apical, anterior   and anteroseptal segment(s) of the left ventricle associated with a mild   degree of eleno-infarct ischemia.     There is nontransmural infarction in the inferior and inferoseptal   segment(s) of the left ventricle associated with a mild degree of   eleno-infarct ischemia.     Left ventricular function is moderately reduced.     The left ventricular ejection fraction at stress is 32%.     There is no prior study for comparison.         Echo:  No results found for this or any previous visit (from the past 4320 hours).    Clinically Significant Risk Factors Present on Admission                  # Hypertension: Noted on problem list           # Overweight: Estimated body mass index is 29.16 kg/m  as calculated from the following:    Height as of 25: 1.734 m (5' 8.25\").    Weight as of 25: 87.6 kg (193 lb 3.2 oz).                  The longitudinal plan of care for the diagnosis(es)/condition(s) as documented were addressed during this visit. Due to the added complexity in care, I " will continue to support Jung in the subsequent management and with ongoing continuity of care.             Thank you for allowing me to participate in the care of your patient.      Sincerely,     Johana Miller MD     [facility]  cc:   Uvaldo Olivera MD  [address] [place] [address]

## 2025-05-01 NOTE — PROGRESS NOTES
Assessment & Plan     Abnormal nuclear stress test  Cardiomyopathy, unspecified type (H)  Heart failure with reduced ejection fraction (H)  - CBC with platelets  Coronary artery disease involving native coronary artery of native heart without angina pectoris    Mainly kept the appointment to clarify the results. However, he already met with cardiology today.  Patient said most questions were answered by cardiologist.  Patient wanted to clarify how much activity he can do - advised he can do activities as he tolerates; advised extremely strenuous activities though.  He still plans to attend his grandson's med-school graduation in a week.   Advised to  make sure he takes all meds.  Reviewed with patient recommendations from cardiology. Scheduled for angiogram in 2 weeks after he gets back from [place].  Reasons to go to ER discussed in detail with patient.     Benign prostatic hyperplasia with incomplete bladder emptying  Patient asked if he should stop tamsulosin since urologist gave him a Rx for finasteride yesterday.  Urology note from yesterday did not mention this specificially.  Will send message to urology about this.      Follow-up   Return in about 2 months (around 7/1/2025) for In-clinic visit for follow up to check blood pressure and breathing symptoms.        Klaus Feng is a 79 year old, presenting for the following health issues:  Results (Stress test results) and Follow Up (Pt would like to follow up on heart concerns and clarify recent findings to get an idea of next steps. )        5/1/2025     1:56 PM   Additional Questions   Roomed by Grecia ROMERO MA   Accompanied by self         5/1/2025     1:56 PM   Patient Reported Additional Medications   Patient reports taking the following new medications none     History of Present Illness       Reason for visit:  Stress test results and clarification of recent findings.   He is taking medications regularly.      Recapof history:  - patient came in fro  "preop recent;y for hernia repair.  - he reported mild LEONARD for the last year on uphill walks.  - LBBB found on EKG then, hence was sent for lexiscan stress test based on his history and symptoms  - yesterday, patient completed lexiscan - was told by tech to go to primary care clinic to get appt ASAP - no details from patient at the time. So he was scheduled for appt today with strict precautions to go to ER if symptoms develop overnight  - later in the day, lexiscan result released -  found to have baseline AF, nontransmural infarct with periinfarct ischemia and reduced EF to 32%.  - this provider reached out to cardiology clinic in [place] - was able to discuss with SHANON. Was decided then that no emergent need to go to ER but needs to see cardiology consult ASAP. She instructed their staff to try to work in patient today - patient saw cardiologist this morning.    Currently, patient denies chest pain, shortness of breath at rest, palpitations or edema         Review of Systems  Constitutional, HEENT, cardiovascular, pulmonary, GI, , musculoskeletal, neuro, skin, endocrine and psych systems are negative, except as otherwise noted.      Objective    /68 (BP Location: Right arm, Patient Position: Sitting, Cuff Size: Adult Regular)   Pulse 63   Temp 97.4  F (36.3  C) (Tympanic)   Resp 20   Ht 1.715 m (5' 7.5\")   Wt 88.5 kg (195 lb)   SpO2 96%   BMI 30.09 kg/m    Body mass index is 30.09 kg/m .  Physical Exam   GEN: alert, oriented x 3, NAD, ambulatory w/o assist  ABD: protuberant  EXT: no edema    Hospital Outpatient Visit on 05/01/2025   Component Date Value Ref Range Status    LVEF  05/01/2025 40%   Final           Signed Electronically by: Uvaldo Olivera MD    "

## 2025-05-01 NOTE — PROGRESS NOTES
Cardiology Consultation     Assessment & Plan     1.  Abnormal stress test as noted below  2.  Left bundle branch block.  EKG normal sinus rhythm in 2019  3.  New cardiomyopathy by stress test  4.  Hypertension  5.  Hyperlipidemia  6.  A-fib noted on stress test EKG and echocardiogram.  7.  New cardiomyopathy likely ischemic in nature  8.  BPH being seen by urology with possible procedures planned in the future    Recommendations    1.  Cardiomyopathy: Likely ischemic based on the available data.  He had a normal QRS in 2019 on EKG however recent study has suggested left bundle branch block again going with his cardiomyopathy.  Atrial fibrillation also is a new diagnosis for him.  I will add losartan to his atenolol.  He will benefit from having coronary angiography performed.  We discussed the risk benefits of potential complications and he is wanting to proceed.  We will arrange for this as an outpatient on May 16, 2025 with me.  We also discussed that he may have multivessel coronary artery disease and we may recommend consideration for bypass surgery.    2.  Atrial fibrillation: New diagnosis.  We will start him on Eliquis and he should continue his low-dose aspirin as well.  Continue with atenolol for rate control    3.  Patient has an important graduation to attend.  He states that his grandson will be graduating from the [employer] medical school next week.  Therefore we will delay our angiogram till the following week.    4.  Thank you kindly for consult we will make adjustments in his medical regimen once we know the data from the coronary angiogram.  He also was told that the findings will likely delay his ability to undergo his hernia surgery and or prostate as well.      The longitudinal plan of care for the diagnosis(es)/condition(s) as documented were addressed during this visit. Due to the added complexity in care, I will continue to support Jung in the subsequent management and with  ongoing continuity of care.           Johana Miller MD, MD        History of present illness    Patient is a pleasant 79-year-old gentleman who presents to establish local cardiac care.  In preparation for a hernia surgery and possibly prostate surgery patient underwent an EKG demonstrating sinus rhythm with a left bundle branch.  This led to a stress test.  During the stress EKG he was noted again to have his left bundle branch block however atrial fibrillation was present.  The stress test was notable for a cardiomyopathy as well as suggestion of prior MI.  Please see below for full details.  An echocardiogram was performed today again confirming no significant valvular heart disease however again a cardiomyopathy with wall motion normalities in the anterior apical and inferior apical segments.  Full report has not been read and we are awaiting the final findings.    Clinically he reports of not feeling the atrial fibrillation.  He reports of no palpitations.  He does note for the last year or 2 a slight decline in his typical fitness level.  He just came back from [place] where he bikes on level ground as well as hiking small mountains.  He has noticed that he has had to stop several times which he has not had in the past.  He reports of no chest pain PND orthopnea lower extremity edema or any other cardiac related concerns.  And again in the background he will need to have possible umbilical hernia surgery and possibly prostate surgery in the future.  Here for establishment of care          MPI:    The nuclear stress test is abnormal.    There is nontransmural infarction in the mid to distal apical, anterior and anteroseptal segment(s) of the left ventricle associated with a mild degree of eleno-infarct ischemia.    There is nontransmural infarction in the inferior and inferoseptal segment(s) of the left ventricle associated with a mild degree of eleno-infarct ischemia.    Left ventricular function is  moderately reduced.    The left ventricular ejection fraction at stress is 32%.    There is no prior study for comparison            Primary Care Physician   Uvaldo Olivera        Patient Active Problem List   Diagnosis    Benign neoplasm of colon    Hyperlipidemia LDL goal <130    Herpes zoster    Diverticulosis of large intestine    Family history of colon cancer    Family history of malignant neoplasm of breast    Essential hypertension with goal blood pressure less than 140/90    Dermatitis    Chronic vasomotor rhinitis    Family history of rectal cancer       Past Medical History   I have reviewed this patient's medical history and updated it with pertinent information if needed.   No past medical history on file.    Past Surgical History   I have reviewed this patient's surgical history and updated it with pertinent information if needed.  Past Surgical History:   Procedure Laterality Date    COLONOSCOPY  2001    negative     COLONOSCOPY  12/13/2011    Procedure:COLONOSCOPY; Colonoscopy; Surgeon:DARCIE FISHER; Location:[facility]    COLONOSCOPY N/A 8/10/2016    Procedure: COLONOSCOPY;  Surgeon: Morales Renteria MD;  Location: [facility]    COLONOSCOPY N/A 10/5/2023    Procedure: COLONOSCOPY, WITH POLYPECTOMY AND BIOPSY;  Surgeon: Emmanuel Kaiser MD;  Location: [facility]    LAPAROSCOPIC HERNIORRHAPHY INGUINAL BILATERAL Bilateral 4/16/2019    Procedure: Laparoscopic bilateral inguinal hernia repair and open umbilical hernia repair;  Surgeon: Mckinley Colindres MD;  Location: [facility]       Prior to Admission Medications   Cannot display prior to admission medications because the patient has not been admitted in this contact.     [unfilled]  [unfilled]  Allergies   Allergies   Allergen Reactions    Nkda [No Known Drug Allergy]        Social History    reports that he quit smoking about 56 years ago. His smoking use included cigarettes. He started smoking about 63 years ago. He has a 7 pack-year smoking history.  He has never used smokeless tobacco. He reports current alcohol use. He reports that he does not use drugs.    Family History   Family History   Problem Relation Age of Onset    Hypertension Mother     Cerebrovascular Disease Mother          of a stroke    Cancer - colorectal Mother         age 70s    Prostate Cancer Father         had prostate removed    Cancer Brother         bladder cancer    Diabetes Maternal Grandmother     Colon Cancer Daughter 53        colon cancer surger    Breast Cancer Daughter     Asthma No family hx of     C.A.D. No family hx of     Melanoma No family hx of        Review of Systems   The comprehensive 10 point Review of Systems is negative other than noted in the HPI or here.     Physical Exam   Vital Signs with Ranges     Wt Readings from Last 4 Encounters:   25 87.6 kg (193 lb 3.2 oz)   24 85.3 kg (188 lb)   24 85.5 kg (188 lb 6.4 oz)   24 83.5 kg (184 lb)     [unfilled]      Vitals: There were no vitals taken for this visit.        @LABRCNTIPR(tropi:5,troponinies:5)@    @LABRCNTIPR(wbc:3,hgb:3,mcv:3,plt:3,inr:3,na:3,potassium:3,chloride:3,co2:3,bun:3,cr:3,gfrestimated:3,gfrestblack:3,aniongap:3,rich:3,glc:3,albumin:2,prottotal:2,bilitotal:2,alkphos:2,alt:2,ast:2,lipase:2,tropi:3)@  Recent Labs   Lab Test 24  1109 23  1010   CHOL 175 158   HDL 50 48   * 84   TRIG 103 131     @LABRCNTIP(wbc:3,hgb:3,hct:3,mcv:3,plt:3,iron:3,ironsat:3,reticabsct:3,retp:3,feb:3,km:3,b12:3,folic:3,epoe:3,morph:3)@  @LABRCNTIP(PH:3,PHV:3,PO2:3,PO2V:3,sat:3,PCO2:3,PCO2V:3,HCO3:3,HCO3V:3)@  @LABRCNTIP(NTBNPI:3,NTBNP:3)@  @LABRCNTIP(DD:1)@  @LABRCNTIP(sed:3,crp:3)@  @LABRCNTIP(PLT:3)@  @LABRCNTIP(TSH:3)@  @LABRCNTIP(color:1,appearance:1,urineg,urinebili:1,urineketone:1,s,ubld:1,urineph:1,protein:1,urobilinogen:1,nitrite:1,leukest:1,rbcu:1,wbcu:1)@    Imaging:  Recent Results (from the past 48 hours)   NM Lexiscan stress test   Result Value    Target HR  "139    Baseline Systolic     Baseline Diastolic BP 79    Last Stress Systolic     Last Stress Diastolic BP 79    Baseline HR 55    Max HR  76    Max Predicted HR  55    Rate Pressure Product 10,184.0    Left Ventricular EF 32    Narrative      The nuclear stress test is abnormal.    There is nontransmural infarction in the mid to distal apical, anterior   and anteroseptal segment(s) of the left ventricle associated with a mild   degree of eleno-infarct ischemia.    There is nontransmural infarction in the inferior and inferoseptal   segment(s) of the left ventricle associated with a mild degree of   eleno-infarct ischemia.    Left ventricular function is moderately reduced.    The left ventricular ejection fraction at stress is 32%.    There is no prior study for comparison.         Echo:  No results found for this or any previous visit (from the past 4320 hours).    Clinically Significant Risk Factors Present on Admission                   # Hypertension: Noted on problem list           # Overweight: Estimated body mass index is 29.16 kg/m  as calculated from the following:    Height as of 4/16/25: 1.734 m (5' 8.25\").    Weight as of 4/16/25: 87.6 kg (193 lb 3.2 oz).                  The longitudinal plan of care for the diagnosis(es)/condition(s) as documented were addressed during this visit. Due to the added complexity in care, I will continue to support Jung in the subsequent management and with ongoing continuity of care.           "

## 2025-05-05 ENCOUNTER — TELEPHONE (OUTPATIENT)
Dept: UROLOGY | Facility: CLINIC | Age: 80
End: 2025-05-05
Payer: COMMERCIAL

## 2025-05-07 ENCOUNTER — MYC MEDICAL ADVICE (OUTPATIENT)
Dept: FAMILY MEDICINE | Facility: CLINIC | Age: 80
End: 2025-05-07
Payer: COMMERCIAL

## 2025-05-07 DIAGNOSIS — E78.5 HYPERLIPIDEMIA LDL GOAL <130: ICD-10-CM

## 2025-05-07 RX ORDER — LOVASTATIN 40 MG/1
40 TABLET ORAL AT BEDTIME
Qty: 100 TABLET | Refills: 2 | Status: SHIPPED | OUTPATIENT
Start: 2025-05-07

## 2025-05-07 NOTE — TELEPHONE ENCOUNTER
Patient is overdue to fill lovastatin. Per our records, last filled 2/27/25 for 60 day supply and is 9 days late to fill. Current prescription out of refills.     Patient has [facility] coverage and with this insurance plan, the patient is eligible to receive certain prescriptions as a 100-day supply at the 90-day supply cost.      Prescriptions updated to 100-day supply per protocol: lovastatin      Haydee Young, PharmD, BCACP  Population Health Pharmacist  [phone]

## 2025-05-16 ENCOUNTER — HOSPITAL ENCOUNTER (OUTPATIENT)
Facility: CLINIC | Age: 80
Discharge: HOME | End: 2025-05-16
Attending: INTERNAL MEDICINE | Admitting: INTERNAL MEDICINE
Payer: COMMERCIAL

## 2025-05-16 VITALS
BODY MASS INDEX: 28.22 KG/M2 | WEIGHT: 186.2 LBS | SYSTOLIC BLOOD PRESSURE: 140 MMHG | OXYGEN SATURATION: 97 % | HEART RATE: 64 BPM | HEIGHT: 68 IN | TEMPERATURE: 97.5 F | RESPIRATION RATE: 16 BRPM | DIASTOLIC BLOOD PRESSURE: 80 MMHG

## 2025-05-16 DIAGNOSIS — R94.39 ABNORMAL NUCLEAR STRESS TEST: ICD-10-CM

## 2025-05-16 DIAGNOSIS — I25.10 CORONARY ARTERY DISEASE INVOLVING NATIVE CORONARY ARTERY OF NATIVE HEART WITHOUT ANGINA PECTORIS: ICD-10-CM

## 2025-05-16 PROBLEM — Z98.890 STATUS POST CORONARY ANGIOGRAM: Status: ACTIVE | Noted: 2025-05-16

## 2025-05-16 LAB
ANION GAP SERPL CALCULATED.3IONS-SCNC: 9 MMOL/L (ref 7–15)
APTT PPP: 28 SECONDS (ref 22–38)
BUN SERPL-MCNC: 22.8 MG/DL (ref 8–23)
CALCIUM SERPL-MCNC: 9.5 MG/DL (ref 8.8–10.4)
CHLORIDE SERPL-SCNC: 103 MMOL/L (ref 98–107)
CREAT SERPL-MCNC: 1.01 MG/DL (ref 0.67–1.17)
EGFRCR SERPLBLD CKD-EPI 2021: 75 ML/MIN/1.73M2
ERYTHROCYTE [DISTWIDTH] IN BLOOD BY AUTOMATED COUNT: 11.9 % (ref 10–15)
GLUCOSE SERPL-MCNC: 110 MG/DL (ref 70–99)
HCO3 SERPL-SCNC: 25 MMOL/L (ref 22–29)
HCT VFR BLD AUTO: 42.9 % (ref 40–53)
HGB BLD-MCNC: 14.6 G/DL (ref 13.3–17.7)
INR PPP: 1.06 (ref 0.85–1.15)
MCH RBC QN AUTO: 30.7 PG (ref 26.5–33)
MCHC RBC AUTO-ENTMCNC: 34 G/DL (ref 31.5–36.5)
MCV RBC AUTO: 90 FL (ref 78–100)
PLATELET # BLD AUTO: 186 10E3/UL (ref 150–450)
POTASSIUM SERPL-SCNC: 4.2 MMOL/L (ref 3.4–5.3)
PROTHROMBIN TIME: 13.6 SECONDS (ref 11.8–14.8)
RBC # BLD AUTO: 4.75 10E6/UL (ref 4.4–5.9)
SODIUM SERPL-SCNC: 137 MMOL/L (ref 135–145)
WBC # BLD AUTO: 7.6 10E3/UL (ref 4–11)

## 2025-05-16 PROCEDURE — 999N000054 HC STATISTIC EKG NON-CHARGEABLE

## 2025-05-16 PROCEDURE — 36415 COLL VENOUS BLD VENIPUNCTURE: CPT | Performed by: INTERNAL MEDICINE

## 2025-05-16 PROCEDURE — 250N000011 HC RX IP 250 OP 636: Performed by: INTERNAL MEDICINE

## 2025-05-16 PROCEDURE — 258N000003 HC RX IP 258 OP 636: Performed by: INTERNAL MEDICINE

## 2025-05-16 PROCEDURE — 999N000184 HC STATISTIC TELEMETRY

## 2025-05-16 PROCEDURE — 99152 MOD SED SAME PHYS/QHP 5/>YRS: CPT | Performed by: INTERNAL MEDICINE

## 2025-05-16 PROCEDURE — 80048 BASIC METABOLIC PNL TOTAL CA: CPT | Performed by: INTERNAL MEDICINE

## 2025-05-16 PROCEDURE — 93005 ELECTROCARDIOGRAM TRACING: CPT

## 2025-05-16 PROCEDURE — 999N000071 HC STATISTIC HEART CATH LAB OR EP LAB

## 2025-05-16 PROCEDURE — 85610 PROTHROMBIN TIME: CPT | Performed by: INTERNAL MEDICINE

## 2025-05-16 PROCEDURE — 85730 THROMBOPLASTIN TIME PARTIAL: CPT | Performed by: INTERNAL MEDICINE

## 2025-05-16 PROCEDURE — 85027 COMPLETE CBC AUTOMATED: CPT | Performed by: INTERNAL MEDICINE

## 2025-05-16 PROCEDURE — C1894 INTRO/SHEATH, NON-LASER: HCPCS | Performed by: INTERNAL MEDICINE

## 2025-05-16 PROCEDURE — 250N000009 HC RX 250: Performed by: INTERNAL MEDICINE

## 2025-05-16 PROCEDURE — 93454 CORONARY ARTERY ANGIO S&I: CPT | Performed by: INTERNAL MEDICINE

## 2025-05-16 PROCEDURE — 272N000001 HC OR GENERAL SUPPLY STERILE: Performed by: INTERNAL MEDICINE

## 2025-05-16 RX ORDER — OXYCODONE HYDROCHLORIDE 5 MG/1
5 TABLET ORAL EVERY 4 HOURS PRN
Status: DISCONTINUED | OUTPATIENT
Start: 2025-05-16 | End: 2025-05-16 | Stop reason: HOSPADM

## 2025-05-16 RX ORDER — ACETAMINOPHEN 325 MG/1
650 TABLET ORAL EVERY 4 HOURS PRN
Status: DISCONTINUED | OUTPATIENT
Start: 2025-05-16 | End: 2025-05-16 | Stop reason: HOSPADM

## 2025-05-16 RX ORDER — FENTANYL CITRATE 50 UG/ML
25 INJECTION, SOLUTION INTRAMUSCULAR; INTRAVENOUS
Refills: 0 | Status: DISCONTINUED | OUTPATIENT
Start: 2025-05-16 | End: 2025-05-16 | Stop reason: HOSPADM

## 2025-05-16 RX ORDER — OXYCODONE HYDROCHLORIDE 5 MG/1
10 TABLET ORAL EVERY 4 HOURS PRN
Status: DISCONTINUED | OUTPATIENT
Start: 2025-05-16 | End: 2025-05-16 | Stop reason: HOSPADM

## 2025-05-16 RX ORDER — LIDOCAINE 40 MG/G
CREAM TOPICAL
Status: DISCONTINUED | OUTPATIENT
Start: 2025-05-16 | End: 2025-05-16 | Stop reason: HOSPADM

## 2025-05-16 RX ORDER — NALOXONE HYDROCHLORIDE 0.4 MG/ML
0.2 INJECTION, SOLUTION INTRAMUSCULAR; INTRAVENOUS; SUBCUTANEOUS
Status: DISCONTINUED | OUTPATIENT
Start: 2025-05-16 | End: 2025-05-16 | Stop reason: HOSPADM

## 2025-05-16 RX ORDER — NALOXONE HYDROCHLORIDE 0.4 MG/ML
0.4 INJECTION, SOLUTION INTRAMUSCULAR; INTRAVENOUS; SUBCUTANEOUS
Status: DISCONTINUED | OUTPATIENT
Start: 2025-05-16 | End: 2025-05-16 | Stop reason: HOSPADM

## 2025-05-16 RX ORDER — SODIUM CHLORIDE 9 MG/ML
INJECTION, SOLUTION INTRAVENOUS CONTINUOUS
Status: DISCONTINUED | OUTPATIENT
Start: 2025-05-16 | End: 2025-05-16 | Stop reason: HOSPADM

## 2025-05-16 RX ORDER — IOPAMIDOL 755 MG/ML
INJECTION, SOLUTION INTRAVASCULAR
Status: DISCONTINUED | OUTPATIENT
Start: 2025-05-16 | End: 2025-05-16 | Stop reason: HOSPADM

## 2025-05-16 RX ORDER — FENTANYL CITRATE 50 UG/ML
INJECTION, SOLUTION INTRAMUSCULAR; INTRAVENOUS
Status: DISCONTINUED | OUTPATIENT
Start: 2025-05-16 | End: 2025-05-16 | Stop reason: HOSPADM

## 2025-05-16 RX ORDER — HEPARIN SODIUM 10000 [USP'U]/100ML
100-1000 INJECTION, SOLUTION INTRAVENOUS CONTINUOUS PRN
Status: DISCONTINUED | OUTPATIENT
Start: 2025-05-16 | End: 2025-05-16 | Stop reason: HOSPADM

## 2025-05-16 RX ORDER — FLUMAZENIL 0.1 MG/ML
0.2 INJECTION, SOLUTION INTRAVENOUS
Status: DISCONTINUED | OUTPATIENT
Start: 2025-05-16 | End: 2025-05-16 | Stop reason: HOSPADM

## 2025-05-16 RX ORDER — ATROPINE SULFATE 0.1 MG/ML
0.5 INJECTION INTRAVENOUS
Status: DISCONTINUED | OUTPATIENT
Start: 2025-05-16 | End: 2025-05-16 | Stop reason: HOSPADM

## 2025-05-16 RX ADMIN — SODIUM CHLORIDE: 0.9 INJECTION, SOLUTION INTRAVENOUS at 08:38

## 2025-05-16 ASSESSMENT — ACTIVITIES OF DAILY LIVING (ADL)
ADLS_ACUITY_SCORE: 41

## 2025-05-16 NOTE — PROGRESS NOTES
Care Suites Post Procedure Note    Patient Information  Name: Velasquez Del Rio  Age: 79 year old    Post Procedure  Time patient returned to [facility]: 1030  Concerns/abnormal assessment: NA  If abnormal assessment, provider notified: N/A  Plan/Other: BR x 2 hours, right groin CDI/soft, denies pain.     Radha Prajapati RN

## 2025-05-16 NOTE — DISCHARGE INSTRUCTIONS
Cardiac Angiogram Discharge Instructions - Femoral    After you go home:    Have an adult stay with you until tomorrow.  Drink extra fluids for 2 days.  You may resume your normal diet.  No smoking       For 24 hours - due to the sedation you received:  Relax and take it easy.  Do NOT make any important or legal decisions.  Do NOT drive or operate machines at home or at work.  Do NOT drink alcohol.    Care of Groin Puncture Site:    For the first 24 hrs - check the puncture site every 1-2 hours while awake.  For 2 days, when you cough, sneeze, laugh or move your bowels, hold your hand over the puncture site and press firmly.  Remove the bandaid after 24 hours. If there is minor oozing, apply another bandaid and remove it after 12 hours.  It is normal to have a small bruise or pea size lump at the site.  You may shower tomorrow. Do NOT take a bath, or use a hot tub or pool for at least 3 days. Do NOT scrub the site. Do not use lotion or powder near the puncture site.    Activity:            For 2 days:  No stooping or squatting  Do NOT do any heavy activity such as exercise, lifting, or straining.   No housework, yard work or any activity that make you sweat  Do NOT lift more than 10 pounds    Bleeding:    If you start bleeding from the site in your groin, lie down flat and press firmly on/above the site for 10 minutes.   Once bleeding stops, lay flat for 2 hours.   Call [facility] as soon as you can.       Call 911 right away if you have heavy bleeding or bleeding that does not stop.      Medicines:    If you are taking an antiplatelet medication such as Plavix, Brilinta or Effient, do not stop taking it until you talk to your cardiologist.    If you are on Metformin (Glucophage), do not restart it until you have blood tests (within 2 to 3 days after discharge).  After you have your blood drawn, you may restart the Metformin.   Take your medications, including blood thinners, unless your provider tells you not to.     If you take Coumadin (Warfarin), have your INR checked by your provider in  3-5 days. Call your clinic to schedule this.  If you have stopped any medicines, check with your provider about when to restart them.    Follow Up Appointments:    Follow up with [facility] Nurse Practitioner at [facility] of patient preference in 7-10 days.    Call the clinic if:    You have increased pain or a large or growing hard lump around the site.  The site is red, swollen, hot or tender.  Blood or fluid is draining from the site.  You have chills or a fever greater than 101 F (38 C).  Your leg feels numb, cool or changes color.  You have hives, a rash or unusual itching.  New pain in the back or belly that you cannot control with Tylenol.  Any questions or concerns.          [facility] at [place]:    [phone] [facility] (7 days a week)    Or you may contact your provider via My Chart

## 2025-05-16 NOTE — Clinical Note
Hemodynamic equipment used: 5 lead ECG, FlowboxK With 3 Leads, Machine BP Cuff and pulse oximeter probe.

## 2025-05-16 NOTE — PROGRESS NOTES
Care Suites Admission Nursing Note    Patient Information  Name: Velasquez Del Rio  Age: 79 year old  Reason for admission: [facility]  Care Suites arrival time: 0810    Visitor Information  Name: Kita Bundy Julie     Patient Admission/Assessment   Pre-procedure assessment complete: Yes  If abnormal assessment/labs, provider notified: N/A  NPO: Yes  Medications held per instructions/orders: Yes, eliquis  Consent: deferred  If applicable, pregnancy test status: deferred  Patient oriented to room: Yes  Education/questions answered: Yes  Plan/other: proceed as scheduled    Discharge Planning  Discharge name/phone number: Niharika  Overnight post sedation caregiver: Tova Vidales  Discharge location: home    Radha Prajapati RN

## 2025-05-16 NOTE — PROGRESS NOTES
Care Suites Discharge Nursing Note    Patient Information  Name: Velasquez Del Rio  Age: 79 year old    Discharge Education:  Discharge instructions reviewed: Yes  Additional education/resources provided: NA  Patient/patient representative verbalizes understanding: Yes  Patient discharging on new medications: No  Medication education completed: N/A    Discharge Plans:   Discharge location: home  Discharge ride contacted: Yes  Approximate discharge time: 1320    Discharge Criteria:  Discharge criteria met and vital signs stable: Yes. Right groin site CDI/soft, denies pain, ambulated/voided/ate/PIV pulled.     Patient Belongs:  Patient belongings returned to patient: Yes    Radha Prajapati RN

## 2025-05-16 NOTE — PRE-PROCEDURE
GENERAL PRE-PROCEDURE:   Procedure:  Coronary angiogram  Date/Time:  5/16/2025 10:04 AM    Verbal consent obtained?: Yes    Written consent obtained?: Yes    Risks and benefits: Risks, benefits and alternatives were discussed    Consent given by:  Patient  Patient states understanding of procedure being performed: Yes    Patient's understanding of procedure matches consent: Yes    Procedure consent matches procedure scheduled: Yes    Expected level of sedation:  Moderate  Appropriately NPO:  Yes  ASA Class:  3  Mallampati  :  Grade 3- soft palate visible, posterior pharyngeal wall not visible  Lungs:  Lungs clear with good breath sounds bilaterally  Heart:  Normal heart sounds and rate  History & Physical reviewed:  History and physical reviewed and no updates needed  Statement of review:  I have reviewed the lab findings, diagnostic data, medications, and the plan for sedation

## 2025-05-17 LAB
ATRIAL RATE - MUSE: 63 BPM
DIASTOLIC BLOOD PRESSURE - MUSE: NORMAL MMHG
INTERPRETATION ECG - MUSE: NORMAL
P AXIS - MUSE: 64 DEGREES
PR INTERVAL - MUSE: 198 MS
QRS DURATION - MUSE: 160 MS
QT - MUSE: 476 MS
QTC - MUSE: 487 MS
R AXIS - MUSE: 32 DEGREES
SYSTOLIC BLOOD PRESSURE - MUSE: NORMAL MMHG
T AXIS - MUSE: 57 DEGREES
VENTRICULAR RATE- MUSE: 63 BPM

## 2025-05-20 ENCOUNTER — TELEPHONE (OUTPATIENT)
Dept: UROLOGY | Facility: CLINIC | Age: 80
End: 2025-05-20
Payer: COMMERCIAL

## 2025-05-20 ENCOUNTER — TELEPHONE (OUTPATIENT)
Dept: CARDIOLOGY | Facility: CLINIC | Age: 80
End: 2025-05-20
Payer: COMMERCIAL

## 2025-05-20 ENCOUNTER — RESULTS FOLLOW-UP (OUTPATIENT)
Dept: CARDIOLOGY | Facility: CLINIC | Age: 80
End: 2025-05-20

## 2025-05-20 NOTE — TELEPHONE ENCOUNTER
[facility] [place] patient     Patient got cleared with Cardiology       Spoke with: Patient       Date of surgery: Thursday June 26 2025 with Dr Ferris    Waitlist to move case to 6/24 if possible patient is available both days       Location: [facility] patient they will need a adult : 23 hr obs      Pre op with provider: Patient will get a updated pre op at the [facility]       H&P Scheduled in PAC- NA         Pre procedure covid : not req      Additional imaging: NA        Surgery Packet : Sent via [email or web address]       Additional comments: Please call for surgery teaching and schedule post op at the [facility] with Dr Ferris

## 2025-05-20 NOTE — TELEPHONE ENCOUNTER
Called and spoke with pt.   Pt has follow up scheduled 6/13/2025 with SHANON and results from angio and plan of care will be reviewed at that visit. No new medications at this time.     Shae Ellison RN

## 2025-05-20 NOTE — TELEPHONE ENCOUNTER
[facility] Call Center    Phone Message    May a detailed message be left on voicemail: yes     Reason for Call: Other: Pt wanted in ensure there was no changes that needed to be made regarding his prescriptions  prior to upcoming appt.       Action Taken: Other: cardio    Travel Screening: Not Applicable     Date of Service:     Thank you!  [facility]

## 2025-05-20 NOTE — TELEPHONE ENCOUNTER
Patient was admitted to [facility] on 5/16/25 who during a stress EKG he was noted again to have his left bundle branch block, atrial fibrillation, and a cardiomyopathy with wall motion normalities in the anterior apical and inferior apical segments who presents for coronary angiogram and possible intervention given new cardiomyopathy.    5/16/25: Coronary angiogram via RFA showed:    Widely patent coronary arteries without angiographic evidence of coronary artery disease.  Consider further evaluation for causes of nonischemic cardiomyopathy.  Successful, uncomplicated right common femoral arterial access with hemostasis achieved in the cardiac catheterization laboratory with manual compression.    No medication changes made.    Called patient to discuss any post hospital d/c questions he may have and confirm f/u appts.     Patient denied any SOB, chest pain or lightheadedness.     RRA cardiac cath site is without bleeding, swelling, redness or signs of infection.     RN confirmed with patient that he is scheduled for a VV on 6/13/25 at 0915 with CARLYLE Gabriela Rubi.    Patient advised to call clinic with any cardiac related questions or concerns prior to this carlyle't. Patient verbalized understanding and agreed with plan. KENDELL Mathew RN.

## 2025-05-28 DIAGNOSIS — I48.0 PAROXYSMAL ATRIAL FIBRILLATION (H): ICD-10-CM

## 2025-06-02 ENCOUNTER — TELEPHONE (OUTPATIENT)
Dept: UROLOGY | Facility: CLINIC | Age: 80
End: 2025-06-02
Payer: COMMERCIAL

## 2025-06-02 NOTE — TELEPHONE ENCOUNTER
Left message for pt to call back to go over surgery instructions for [place].  Gemma CONTE RN BSN PHN  Specialty Clinics

## 2025-06-03 DIAGNOSIS — I48.0 PAROXYSMAL ATRIAL FIBRILLATION (H): ICD-10-CM

## 2025-06-03 NOTE — TELEPHONE ENCOUNTER
[facility] Call Center    Phone Message    May a detailed message be left on voicemail: yes     Reason for Call: Other: returning call to clinic, please reach out      Action Taken: Other: urology    Travel Screening: Not Applicable     Date of Service:

## 2025-06-03 NOTE — TELEPHONE ENCOUNTER
Pre Op Teaching Flowsheet       Pre and Post op Patient Education  Relevant Diagnosis:  BPH  Teaching Topic:  Pre and post op teaching  Person Involved in teaching:  Patient    Patient demonstrates understanding of the following:  Date and time of surgery:  June 26th at 1500  Location of surgery:  [place]  History and Physical and any other testing necessary prior to surgery: Yes  Required time line for completion of History and Physical and any pre-op testing: Yes, within 30 days    NPO Guidelines: NPO after midnight   Do not eat anything after midnight (12 a.m.).     If you have questions about whether or not to  take certain medicines, ask your doctor.  Failure to follow these directions will cause a delay  in your surgery.    The morning of your surgery:   If you need to take pills, take them with a sip of  water.    Pre-op showering/scrub information with PCMX Soap: Yes  Medications to take the day of surgery:  Per PCP  Blood thinner medications discussed and when to stop (if applicable):  Yes  Diabetes medication management (if applicable):  N/A  Discussed pain control after surgery: pain scale  Infection Prevention: Patient demonstrates understanding of the following:  Patient instructed on hand hygiene:  Yes  Surgical procedure site care taught: Yes  Signs and symptoms of infection taught:  Yes  Wound care will be taught at the time of discharge.  Central venous catheter care will be taught at the time of discharge (if applicable).    Motivation Level:  Asks Questions: Yes  Eager to Learn:  Yes  Cooperative: Yes  Receptive (willing/able to accept information):  Yes    If you have any questions, please call:  Preoperative Assessment Center (PAC) Registered Nurse: [phone], or Urology Clinic and [facility]: [phone]    Post-op follow-up:  Discussed how to contact the hospital, nurse, and clinic scheduling staff if necessary.    Instructional materials  used/given/mailed:  [place] Surgery Booklet, post op teaching sheet, Map, Soap, and arrival/location information.    Surgical instructions mailed to patient Yes.    Gemma CONTE RN BSN PHN  Specialty Clinics

## 2025-06-13 PROBLEM — I42.8 NONISCHEMIC CARDIOMYOPATHY (H): Status: ACTIVE | Noted: 2025-06-13

## 2025-06-17 ENCOUNTER — OFFICE VISIT (OUTPATIENT)
Dept: FAMILY MEDICINE | Facility: CLINIC | Age: 80
End: 2025-06-17
Payer: COMMERCIAL

## 2025-06-17 ENCOUNTER — RESULTS FOLLOW-UP (OUTPATIENT)
Dept: FAMILY MEDICINE | Facility: CLINIC | Age: 80
End: 2025-06-17

## 2025-06-17 ENCOUNTER — LAB (OUTPATIENT)
Dept: LAB | Facility: CLINIC | Age: 80
End: 2025-06-17
Payer: COMMERCIAL

## 2025-06-17 VITALS
HEART RATE: 88 BPM | WEIGHT: 185.3 LBS | TEMPERATURE: 96.9 F | OXYGEN SATURATION: 96 % | RESPIRATION RATE: 20 BRPM | SYSTOLIC BLOOD PRESSURE: 126 MMHG | BODY MASS INDEX: 29.08 KG/M2 | HEIGHT: 67 IN | DIASTOLIC BLOOD PRESSURE: 82 MMHG

## 2025-06-17 DIAGNOSIS — E78.5 HYPERLIPIDEMIA LDL GOAL <130: Primary | ICD-10-CM

## 2025-06-17 DIAGNOSIS — I48.0 PAROXYSMAL ATRIAL FIBRILLATION (H): ICD-10-CM

## 2025-06-17 DIAGNOSIS — Z13.6 SCREENING FOR CARDIOVASCULAR CONDITION: ICD-10-CM

## 2025-06-17 DIAGNOSIS — N40.1 BENIGN PROSTATIC HYPERPLASIA WITH INCOMPLETE BLADDER EMPTYING: ICD-10-CM

## 2025-06-17 DIAGNOSIS — Z01.818 PREOP GENERAL PHYSICAL EXAM: Primary | ICD-10-CM

## 2025-06-17 DIAGNOSIS — Z12.5 SCREENING FOR PROSTATE CANCER: ICD-10-CM

## 2025-06-17 DIAGNOSIS — R39.14 BENIGN PROSTATIC HYPERPLASIA WITH INCOMPLETE BLADDER EMPTYING: ICD-10-CM

## 2025-06-17 DIAGNOSIS — I42.9 CARDIOMYOPATHY, UNSPECIFIED TYPE (H): ICD-10-CM

## 2025-06-17 DIAGNOSIS — I10 ESSENTIAL HYPERTENSION WITH GOAL BLOOD PRESSURE LESS THAN 140/90: ICD-10-CM

## 2025-06-17 DIAGNOSIS — I50.20 HEART FAILURE WITH REDUCED EJECTION FRACTION (H): ICD-10-CM

## 2025-06-17 DIAGNOSIS — E78.5 HYPERLIPIDEMIA LDL GOAL <130: ICD-10-CM

## 2025-06-17 DIAGNOSIS — I25.10 CORONARY ARTERY DISEASE INVOLVING NATIVE CORONARY ARTERY OF NATIVE HEART WITHOUT ANGINA PECTORIS: ICD-10-CM

## 2025-06-17 LAB
ALT SERPL W P-5'-P-CCNC: 22 U/L (ref 0–70)
CHOLEST SERPL-MCNC: 156 MG/DL
FASTING STATUS PATIENT QL REPORTED: YES
HDLC SERPL-MCNC: 46 MG/DL
LDLC SERPL CALC-MCNC: 89 MG/DL
NONHDLC SERPL-MCNC: 110 MG/DL
PSA SERPL DL<=0.01 NG/ML-MCNC: 2.73 NG/ML
TRIGL SERPL-MCNC: 107 MG/DL

## 2025-06-17 PROCEDURE — 3079F DIAST BP 80-89 MM HG: CPT | Performed by: FAMILY MEDICINE

## 2025-06-17 PROCEDURE — G0103 PSA SCREENING: HCPCS

## 2025-06-17 PROCEDURE — 3074F SYST BP LT 130 MM HG: CPT | Performed by: FAMILY MEDICINE

## 2025-06-17 PROCEDURE — 80061 LIPID PANEL: CPT

## 2025-06-17 PROCEDURE — 1126F AMNT PAIN NOTED NONE PRSNT: CPT | Performed by: FAMILY MEDICINE

## 2025-06-17 PROCEDURE — 87086 URINE CULTURE/COLONY COUNT: CPT

## 2025-06-17 PROCEDURE — 99214 OFFICE O/P EST MOD 30 MIN: CPT | Performed by: FAMILY MEDICINE

## 2025-06-17 PROCEDURE — 84460 ALANINE AMINO (ALT) (SGPT): CPT

## 2025-06-17 PROCEDURE — 36415 COLL VENOUS BLD VENIPUNCTURE: CPT

## 2025-06-17 ASSESSMENT — PAIN SCALES - GENERAL: PAINLEVEL_OUTOF10: NO PAIN (0)

## 2025-06-17 NOTE — PATIENT INSTRUCTIONS
How to Take Your Medication Before Surgery  Preoperative Medication Instructions   Antiplatelet or Anticoagulation Medication Instructions   - apixaban (Eliquis), edoxaban (Savaysa), rivaroxaban (Xarelto): Neuraxial or regional block anticipated AND CrCL (>=) 50mL/min. DO NOT TAKE 3 days before surgery.     Additional Medication Instructions  Take all scheduled medications on the day of surgery EXCEPT for modifications listed below:   - ACE/ARB/ARNI (lisinopril, enalapril, losartan, valsartan, olmesartan, sacubritril/valsartan) : Continue without modification (e.g., MAC anesthesia, neurosurgery, spine surgery, heart failure, or labile hypertension with risk of hypertension).   - Beta Blockers (atenolol, metoprolol, propranolol) : Continue taking on the day of surgery.   - Statins (atorvastatin, simvastatin, pravastatin) : Continue taking on the day of surgery.    - Topicals: DO NOT TAKE day of surgery.  - tamsulosin and finasteride - take without modification.     Do not take Ibuprofen, Aspirin or Naproxen.  If you need to take something for pain, take Acetaminophen 325 mg orally 1-2 tabs every 4-6 hrs as needed for pain     Patient Education   Preparing for Your Surgery  For Adults  Getting started  In most cases, a nurse will call to review your health history and instructions. They will give you an arrival time based on your scheduled surgery time. Please be ready to share:  Your doctor's clinic name and phone number  Your medical, surgical, and anesthesia history  A list of allergies and sensitivities  A list of medicines, including herbal treatments and over-the-counter drugs  Whether the patient has a legal guardian (ask how to send us the papers in advance)  Note: You may not receive a call if you were seen at our PAC (Preoperative Assessment Center).  Please tell us if you're pregnant--or if there's any chance you might be pregnant. Some surgeries may injure a fetus (unborn baby), so they require a pregnancy  test. Surgeries that are safe for a fetus don't always need a test, and you can choose whether to have one.   Preparing for surgery  Within 10 to 30 days of surgery: Have a pre-op exam (sometimes called an H&P, or History and Physical). This can be done at a clinic or pre-operative center.  If you're having a , you may not need this exam. Talk to your care team.  At your pre-op exam, talk to your care team about all medicines you take. (This includes CBD oil and any drugs, such as THC, marijuana, and other forms of cannabis.) If you need to stop any medicine before surgery, ask when to start taking it again.  This is for your safety. Many medicines and drugs can make you bleed too much during surgery. Some change how well surgery (anesthesia) drugs work.  Call your insurance company to let them know you're having surgery. (If you don't have insurance, call [phone].)  Call your clinic if there's any change in your health. This includes a scrape or scratch near the surgery site, or any signs of a cold (sore throat, runny nose, cough, rash, fever).  Eating and drinking guidelines  For your safety: Unless your surgeon tells you otherwise, follow the guidelines below.  Eat and drink as normal until 8 hours before you arrive for surgery. After that, no food or milk. You can spit out gum when you arrive.  Drink clear liquids until 2 hours before you arrive. These are liquids you can see through, like water, Gatorade, and Propel Water. They also include plain black coffee and tea (no cream or milk).  No alcohol for 24 hours before you arrive. The night before surgery, stop any drinks that contain THC.  If your care team tells you to take medicine on the morning of surgery, it's okay to take it with a sip of water. No other medicines or drugs are allowed (including CBD oil)--follow your care team's instructions.  If you have questions the day of surgery, call your hospital or surgery center.   Preventing  infection  Shower or bathe the night before and the morning of surgery. Follow the instructions your clinic gave you. (If no instructions, use regular soap.)  Don't shave or clip hair near your surgery site. We'll remove the hair if needed.  Don't smoke or vape the morning of surgery. No chewing tobacco for 6 hours before you arrive. A nicotine patch is okay. You may spit out nicotine gum when you arrive.  For some surgeries, the surgeon will tell you to fully quit smoking and nicotine.  We will make every effort to keep you safe from infection. We will:  Clean our hands often with soap and water (or an alcohol-based hand rub).  Clean the skin at your surgery site with a special soap that kills germs.  Give you a special gown to keep you warm. (Cold raises the risk of infection.)  Wear hair covers, masks, gowns, and gloves during surgery.  Give antibiotic medicine, if prescribed. Not all surgeries need this medicine.  What to bring on the day of surgery  Photo ID and insurance card  Copy of your health care directive, if you have one  Glasses and hearing aids (bring cases)  You can't wear contacts during surgery  Inhaler and eye drops, if you use them (tell us about these when you arrive)  CPAP machine or breathing device, if you use them  A few personal items, if spending the night  If you have . . .  A pacemaker, ICD (cardiac defibrillator), or other implant: Bring the ID card.  An implanted stimulator: Bring the remote control.  A legal guardian: Bring a copy of the certified (court-stamped) guardianship papers.  Please remove any jewelry, including body piercings. Leave jewelry and other valuables at home.  If you're going home the day of surgery  You must have a support person drive you home. They should stay with you overnight, and they may need to help with your self-care.  If you don't have a support person, please tells us as soon as possible. We can help.  After surgery  If it's hard to control your pain  or you need more pain medicine, please call your surgeon's office.  Questions?   If you have any questions for your care team, list them here:   ____________________________________________________________________________________________________________________________________________________________________________________________________________________________________________________________  For informational purposes only. Not to replace the advice of your health care provider. Copyright   2003, 2019 [facility]. All rights reserved. Clinically reviewed by Ebenezer Liu MD. [employer] 312276 - REV 02/25.

## 2025-06-17 NOTE — PROGRESS NOTES
Preoperative Evaluation  [facility]  [address] [address]  Phone: [phone]  Primary Provider: Uvaldo Olivera MD  Pre-op Performing Provider: Uvaldo Olivera MD  Jun 17, 2025 6/17/2025   Surgical Information   What procedure is being done? prostate   Facility or Hospital where procedure/surgery will be performed: [facility]   Who is doing the procedure / surgery? huy   Date of surgery / procedure: june 26   Time of surgery / procedure: 3   Where do you plan to recover after surgery? at home with family     Fax number for surgical facility: Note does not need to be faxed, will be available electronically in Epic.    Assessment & Plan     The proposed surgical procedure is considered INTERMEDIATE risk.    Preop general physical exam    Benign prostatic hyperplasia with incomplete bladder emptying    Heart failure with reduced ejection fraction (H)  Asymptomatic now. Not in exacerbation.  Reinforced heart healthy lifestyle.   Continue current meds.      Cardiomyopathy, unspecified type (H)  Continue to see cardiology. Asymptomatic today.  Reviewed with patient last coronary angio report - no coronary artery disease found.    Paroxysmal atrial fibrillation (H)  No concerns today.  Apixaban to continue. Hold 3 days before surgery then restart when hemostasis is assured    Essential hypertension with goal blood pressure less than 140/90  Controlled.  Low salt, low fat diet.   Exercise as tolerated.  Take meds as prescribed; call if with side effects.      Hyperlipidemia LDL goal <130  Reinforced heart healthy lifestyle. Continue statin              - No identified additional risk factors other than previously addressed    Preoperative Medication Instructions  Antiplatelet or Anticoagulation Medication Instructions   - apixaban (Eliquis), edoxaban (Savaysa), rivaroxaban (Xarelto): Neuraxial or regional block anticipated AND CrCL (>=) 50mL/min. DO NOT  TAKE 3 days before surgery.     Additional Medication Instructions  Take all scheduled medications on the day of surgery EXCEPT for modifications listed below:   - ACE/ARB/ARNI (lisinopril, enalapril, losartan, valsartan, olmesartan, sacubritril/valsartan) : Continue without modification (e.g., MAC anesthesia, neurosurgery, spine surgery, heart failure, or labile hypertension with risk of hypertension).   - Beta Blockers (atenolol, metoprolol, propranolol) : Continue taking on the day of surgery.   - Statins (atorvastatin, simvastatin, pravastatin) : Continue taking on the day of surgery.    - Topicals: DO NOT TAKE day of surgery.  - tamsulosin and finasteride - take without modification.    Recommendation  Approval given to proceed with proposed procedure, without further diagnostic evaluation.    Follow-up   Return if symptoms worsen or fail to improve.        Klaus Feng is a 79 year old, presenting for the following:  Pre-Op Exam          6/17/2025    10:20 AM   Additional Questions   Roomed by Grecia ROMERO MA   Accompanied by self         6/17/2025    10:20 AM   Patient Reported Additional Medications   Patient reports taking the following new medications none     HPI: Patient has BPH with incomplete bladder emptying. Hence, planned surgery.            6/17/2025   Pre-Op Questionnaire   Have you ever had a heart attack or stroke? No   Have you ever had surgery on your heart or blood vessels, such as a stent placement, a coronary artery bypass, or surgery on an artery in your head, neck, heart, or legs? No   Do you have chest pain with activity? No   Do you have a history of heart failure? No   Do you currently have a cold, bronchitis or symptoms of other infection? No   Do you have a cough, shortness of breath, or wheezing? No   Do you or anyone in your family have previous history of blood clots? No   Do you or does anyone in your family have a serious bleeding problem such as prolonged bleeding following  surgeries or cuts? No   Have you ever had problems with anemia or been told to take iron pills? No   Have you had any abnormal blood loss such as black, tarry or bloody stools? No   Have you ever had a blood transfusion? No   Are you willing to have a blood transfusion if it is medically needed before, during, or after your surgery? Yes   Have you or any of your relatives ever had problems with anesthesia? No   Do you have sleep apnea, excessive snoring or daytime drowsiness? No   Do you have any artifical heart valves or other implanted medical devices like a pacemaker, defibrillator, or continuous glucose monitor? No   Do you have artificial joints? No   Are you allergic to latex? No     Advance Care Planning    Document on file is a Health Care Directive or POLST.    Preoperative Review of    reviewed - no record of controlled substances prescribed.      Status of Chronic Conditions:  See problem list for active medical problems.  Problems all longstanding and stable, except as noted/documented.  See ROS for pertinent symptoms related to these conditions.    CHF - Patient has a longstanding history of moderate-severe CHF. Exacerbating conditions include still under investigation with cardiology. Currently the patient's condition is same. Current treatment regimen includes Angiotensin 2 receptor blocker, beta blocker, and statin. The patient denies chest pain, edema, orthopnea, SOB or recent weight gain. Patient had coronary angiogram 1 month ago showing no significant coronary artery disease..     HYPERLIPIDEMIA - Patient has a long history of significant Hyperlipidemia requiring medication for treatment with recent fair control. Patient reports no problems or side effects with the medication.     HYPERTENSION - Patient has longstanding history of HTN , currently denies any symptoms referable to elevated blood pressure. Specifically denies chest pain, palpitations, dyspnea, orthopnea, PND or peripheral  edema. Blood pressure readings have been in normal range. Current medication regimen is as listed below. Patient denies any side effects of medication.     Patient Active Problem List    Diagnosis Date Noted    Nonischemic cardiomyopathy (H) 06/13/2025     Priority: Medium    Status post coronary angiogram 05/16/2025     Priority: Medium    Abnormal nuclear stress test 05/16/2025     Priority: Medium    Heart failure with reduced ejection fraction (H) 05/01/2025     Priority: Medium    Family history of rectal cancer 08/12/2020     Priority: Medium     Mother and daughter      Chronic vasomotor rhinitis 07/24/2017     Priority: Medium    Essential hypertension with goal blood pressure less than 140/90 07/22/2016     Priority: Medium    Dermatitis 07/22/2016     Priority: Medium    Family history of malignant neoplasm of breast 05/19/2016     Priority: Medium     Sister, daughter, and 4 nieces.       Diverticulosis of large intestine 08/24/2015     Priority: Medium     Scope in 2011.      Family history of colon cancer 08/24/2015     Priority: Medium     Mother. Needs colonoscopy every 5 years.       Herpes zoster 07/30/2012     Priority: Medium     right lower extremity        Hyperlipidemia LDL goal <130 10/31/2010     Priority: Medium    Benign neoplasm of colon 11/09/2007     Priority: Low     Tubular adenoma-two noted on 2007 colonoscopy. diverticulosis otherwise normal colonoscopy 11/11        No past medical history on file.  Past Surgical History:   Procedure Laterality Date    COLONOSCOPY  2001    negative     COLONOSCOPY  12/13/2011    Procedure:COLONOSCOPY; Colonoscopy; Surgeon:DARCIE FISHER; Location:[facility]    COLONOSCOPY N/A 8/10/2016    Procedure: COLONOSCOPY;  Surgeon: Morales Renetria MD;  Location: [facility]    COLONOSCOPY N/A 10/5/2023    Procedure: COLONOSCOPY, WITH POLYPECTOMY AND BIOPSY;  Surgeon: Emamnuel Kaiser MD;  Location: [facility]    CV CORONARY ANGIOGRAM N/A 5/16/2025    Procedure:  Coronary Angiogram;  Surgeon: Johana Miller MD;  Location: [facility]    LAPAROSCOPIC HERNIORRHAPHY INGUINAL BILATERAL Bilateral 2019    Procedure: Laparoscopic bilateral inguinal hernia repair and open umbilical hernia repair;  Surgeon: Mckinley Colindres MD;  Location: [facility]     Current Outpatient Medications   Medication Sig Dispense Refill    apixaban ANTICOAGULANT (ELIQUIS) 5 MG tablet Take 1 tablet (5 mg) by mouth 2 times daily. 90 tablet 3    finasteride (PROSCAR) 5 MG tablet Take 1 tablet (5 mg) by mouth daily. To take until prostate surgery to reduce risk of bleeding 30 tablet 1    fluticasone (FLONASE) 50 MCG/ACT nasal spray Spray 1-2 sprays into both nostrils daily as needed for rhinitis 51 g 3    losartan (COZAAR) 25 MG tablet Take 1 tablet (25 mg) by mouth daily. 90 tablet 3    lovastatin (MEVACOR) 40 MG tablet Take 1 tablet (40 mg) by mouth at bedtime. 100 tablet 2    metoprolol succinate ER (TOPROL XL) 25 MG 24 hr tablet Take 1 tablet (25 mg) by mouth every evening. 90 tablet 3    tamsulosin (FLOMAX) 0.4 MG capsule Take 1 capsule (0.4 mg) by mouth daily 90 capsule 3    triamcinolone (ARISTOCORT HP) 0.5 % external cream Apply topically twice a day as needed to affected area. Call to refill. 30 g 3       Allergies   Allergen Reactions    Nkda [No Known Drug Allergy]         Social History     Tobacco Use    Smoking status: Former     Current packs/day: 0.00     Average packs/day: 1 pack/day for 7.0 years (7.0 ttl pk-yrs)     Types: Cigarettes     Start date: 1961     Quit date: 1968     Years since quittin.6    Smokeless tobacco: Never   Substance Use Topics    Alcohol use: Yes     Comment: minimal     Family History   Problem Relation Age of Onset    Hypertension Mother     Cerebrovascular Disease Mother          of a stroke    Cancer - colorectal Mother         age 70s    Prostate Cancer Father         had prostate removed    Cancer Brother          "bladder cancer    Diabetes Maternal Grandmother     Colon Cancer Daughter 53        colon cancer surger    Breast Cancer Daughter     Asthma No family hx of     C.A.D. No family hx of     Melanoma No family hx of      History   Drug Use No             Review of Systems  Constitutional, HEENT, cardiovascular, pulmonary, GI, , musculoskeletal, neuro, skin, endocrine and psych systems are negative, except as otherwise noted.    Objective    /82 (BP Location: Right arm, Patient Position: Sitting, Cuff Size: Adult Regular)   Pulse 88   Temp 96.9  F (36.1  C) (Tympanic)   Resp 20   Ht 1.695 m (5' 6.75\")   Wt 84.1 kg (185 lb 4.8 oz)   SpO2 96%   BMI 29.24 kg/m     Estimated body mass index is 29.24 kg/m  as calculated from the following:    Height as of this encounter: 1.695 m (5' 6.75\").    Weight as of this encounter: 84.1 kg (185 lb 4.8 oz).  Physical Exam  GENERAL APPEARANCE: alert and no distress; ambulatory w/o assist  EYES: pink conj, no icterus, PERRL, EOMI  HENT: ear canals and TM's normal, nose and mouth without ulcers or lesions, oropharynx clear and oral mucous membranes moist  NECK: no adenopathy, no asymmetry, masses, or scars and thyroid normal to palpation  RESP: lungs clear to auscultation - no rales, rhonchi or wheezes  CV: regular rates and rhythm, normal S1 S2, no S3 or S4, no murmur, click or rub, no peripheral edema and peripheral pulses strong  ABDOMEN: soft, nontender, no hepatosplenomegaly, no masses and bowel sounds normal  MS: no musculoskeletal defects are noted and gait is age appropriate without ataxia  SKIN: good turgor, no rash/jaundice/ecchymosis  NEURO: Normal strength and tone, sensory exam grossly normal, mentation intact and speech normal     Recent Labs   Lab Test 05/16/25  0837 05/01/25  1451 06/27/24  1109   HGB 14.6 14.1  --     187  --    INR 1.06  --   --      --  140   POTASSIUM 4.2  --  4.8   CR 1.01  --  0.91        Diagnostics  No labs were ordered " during this visit.   No EKG this visit, completed in the last 90 days.and patient had nromal coronary angiogram just last month    Revised Cardiac Risk Index (RCRI)  The patient has the following serious cardiovascular risks for perioperative complications:   - No serious cardiac risks = 1 points     RCRI Interpretation: 1 point: Class II (low risk - 0.9% complication rate)         Signed Electronically by: Uvaldo Olivera MD  A copy of this evaluation report is provided to the requesting physician.

## 2025-06-18 ENCOUNTER — RESULTS FOLLOW-UP (OUTPATIENT)
Dept: SURGERY | Facility: CLINIC | Age: 80
End: 2025-06-18

## 2025-06-18 ENCOUNTER — OFFICE VISIT (OUTPATIENT)
Dept: FAMILY MEDICINE | Facility: CLINIC | Age: 80
End: 2025-06-18
Payer: COMMERCIAL

## 2025-06-18 VITALS
DIASTOLIC BLOOD PRESSURE: 66 MMHG | BODY MASS INDEX: 29.05 KG/M2 | SYSTOLIC BLOOD PRESSURE: 130 MMHG | RESPIRATION RATE: 20 BRPM | HEIGHT: 67 IN | TEMPERATURE: 98 F | OXYGEN SATURATION: 94 % | WEIGHT: 185.1 LBS | HEART RATE: 93 BPM

## 2025-06-18 DIAGNOSIS — N40.1 BENIGN PROSTATIC HYPERPLASIA WITH INCOMPLETE BLADDER EMPTYING: ICD-10-CM

## 2025-06-18 DIAGNOSIS — Z00.00 ENCOUNTER FOR MEDICARE ANNUAL WELLNESS EXAM: Primary | ICD-10-CM

## 2025-06-18 DIAGNOSIS — H60.543 DERMATITIS OF BOTH EAR CANALS: ICD-10-CM

## 2025-06-18 DIAGNOSIS — R21 RASH AND OTHER NONSPECIFIC SKIN ERUPTION: ICD-10-CM

## 2025-06-18 DIAGNOSIS — R39.14 BENIGN PROSTATIC HYPERPLASIA WITH INCOMPLETE BLADDER EMPTYING: ICD-10-CM

## 2025-06-18 DIAGNOSIS — J30.0 CHRONIC VASOMOTOR RHINITIS: ICD-10-CM

## 2025-06-18 LAB — BACTERIA UR CULT: NO GROWTH

## 2025-06-18 PROCEDURE — G2211 COMPLEX E/M VISIT ADD ON: HCPCS | Performed by: FAMILY MEDICINE

## 2025-06-18 PROCEDURE — 3078F DIAST BP <80 MM HG: CPT | Performed by: FAMILY MEDICINE

## 2025-06-18 PROCEDURE — 99214 OFFICE O/P EST MOD 30 MIN: CPT | Mod: 25 | Performed by: FAMILY MEDICINE

## 2025-06-18 PROCEDURE — G0439 PPPS, SUBSEQ VISIT: HCPCS | Performed by: FAMILY MEDICINE

## 2025-06-18 PROCEDURE — 3075F SYST BP GE 130 - 139MM HG: CPT | Performed by: FAMILY MEDICINE

## 2025-06-18 PROCEDURE — 1126F AMNT PAIN NOTED NONE PRSNT: CPT | Performed by: FAMILY MEDICINE

## 2025-06-18 RX ORDER — TRIAMCINOLONE ACETONIDE 5 MG/G
CREAM TOPICAL
Qty: 15 G | Refills: 2 | Status: SHIPPED | OUTPATIENT
Start: 2025-06-18

## 2025-06-18 RX ORDER — FLUTICASONE PROPIONATE 50 MCG
1-2 SPRAY, SUSPENSION (ML) NASAL DAILY PRN
Qty: 51 G | Refills: 3 | Status: SHIPPED | OUTPATIENT
Start: 2025-06-18

## 2025-06-18 RX ORDER — TAMSULOSIN HYDROCHLORIDE 0.4 MG/1
0.4 CAPSULE ORAL DAILY
Qty: 90 CAPSULE | Refills: 3 | Status: SHIPPED | OUTPATIENT
Start: 2025-06-18

## 2025-06-18 RX ORDER — BENZOCAINE/MENTHOL 6 MG-10 MG
LOZENGE MUCOUS MEMBRANE
Status: SHIPPED
Start: 2025-06-18

## 2025-06-18 SDOH — HEALTH STABILITY: PHYSICAL HEALTH: ON AVERAGE, HOW MANY MINUTES DO YOU ENGAGE IN EXERCISE AT THIS LEVEL?: 20 MIN

## 2025-06-18 SDOH — HEALTH STABILITY: PHYSICAL HEALTH: ON AVERAGE, HOW MANY DAYS PER WEEK DO YOU ENGAGE IN MODERATE TO STRENUOUS EXERCISE (LIKE A BRISK WALK)?: 5 DAYS

## 2025-06-18 ASSESSMENT — SOCIAL DETERMINANTS OF HEALTH (SDOH): HOW OFTEN DO YOU GET TOGETHER WITH FRIENDS OR RELATIVES?: ONCE A WEEK

## 2025-06-18 ASSESSMENT — PAIN SCALES - GENERAL: PAINLEVEL_OUTOF10: NO PAIN (0)

## 2025-06-18 NOTE — PROGRESS NOTES
"Preventive Care Visit  [facility]  Uvaldo Olivera MD, Family Medicine  Jun 18, 2025      Assessment & Plan     Encounter for Medicare annual wellness exam  Patient was advised on recommended screening and preventive health recommendations.  He verbalized understanding and agreed to the plans below.     Chronic vasomotor rhinitis  Stable on flonase per patient.    - fluticasone (FLONASE) 50 MCG/ACT nasal spray  Dispense: 51 g; Refill: 3  - OFFICE/OUTPT VISIT,EST,LEVL IV    Benign prostatic hyperplasia with incomplete bladder emptying  Having prostate surgery soon per urology.  - tamsulosin (FLOMAX) 0.4 MG capsule  Dispense: 90 capsule; Refill: 3  - OFFICE/OUTPT VISIT,EST,LEVL IV    Rash and other nonspecific skin eruption  Rare use per patient.   Return precautions discussed and given to patient.   - triamcinolone (ARISTOCORT HP) 0.5 % external cream  Dispense: 15 g; Refill: 2  - OFFICE/OUTPT VISIT,EST,LEVL IV    Dermatitis of both ear canals  Mild scaly EAM lining today.  Advised sparing use of low potency corticosteroid topically.  Return precautions discussed and given to patient.   - hydrocortisone (CORTAID) 1 % external cream      Patient has been advised of split billing requirements and indicates understanding: Yes        BMI  Estimated body mass index is 29.21 kg/m  as calculated from the following:    Height as of this encounter: 1.695 m (5' 6.75\").    Weight as of this encounter: 84 kg (185 lb 1.6 oz).   Weight management plan: Discussed healthy diet and exercise guidelines  Reviewed preventive health counseling, as reflected in patient instructions  Counseling  Appropriate preventive services were addressed with this patient via screening, questionnaire, or discussion as appropriate for fall prevention, nutrition, physical activity, Tobacco-use cessation, social engagement, weight loss and cognition.  Checklist reviewing preventive services available has been given to the " patient.  Reviewed patient's diet, addressing concerns and/or questions.   The patient was provided with written information regarding signs of hearing loss.   Information on urinary incontinence and treatment options given to patient.         Follow-up   Return in about 1 year (around 6/18/2026) for In-person visit for next wellness exam.     Follow-up Visit   Expected date:  Jun 25, 2026 (Approximate)      Follow Up Appointment Details:     Follow-up with whom?: PCP    Follow-Up for what?: Medicare Wellness    Welcome or Annual?: Annual Wellness    How?: In Person                 Subjective   Jung is a 79 year old, presenting for the following:  Physical        6/18/2025     3:50 PM   Additional Questions   Roomed by Meron ANGUIANO   Accompanied by self          HPI         Has had labs completed yesterday.    Ears intermittently itch in the opening of the canals.  Infrequent.  No treatment.  Not itching today.    Advance Care Planning    Document on file is a Health Care Directive or POLST.        6/18/2025   General Health   How would you rate your overall physical health? Good   Feel stress (tense, anxious, or unable to sleep) Not at all         6/18/2025   Nutrition   Diet: Regular (no restrictions)         6/18/2025   Exercise   Days per week of moderate/strenous exercise 5 days   Average minutes spent exercising at this level 20 min         6/18/2025   Social Factors   Frequency of gathering with friends or relatives Once a week   Worry food won't last until get money to buy more No   Food not last or not have enough money for food? No   Do you have housing? (Housing is defined as stable permanent housing and does not include staying outside in a car, in a tent, in an abandoned building, in an overnight shelter, or couch-surfing.) Yes   Are you worried about losing your housing? No   Lack of transportation? No   Unable to get utilities (heat,electricity)? No         6/18/2025   Fall Risk   Fallen 2 or more times in  the past year? No    Trouble with walking or balance? No        Proxy-reported          2025   Activities of Daily Living- Home Safety   Needs help with the following daily activites None of the above   Safety concerns in the home None of the above         2025   Dental   Dentist two times every year? Yes         2025   Hearing Screening   Hearing concerns? (!) I NEED TO ASK PEOPLE TO SPEAK UP OR REPEAT THEMSELVES.    (!) IT'S HARDER TO UNDERSTAND WOMEN'S VOICES THAN MEN'S VOICES.    (!) IT'S HARD TO FOLLOW A CONVERSATION IN A NOISY RESTAURANT OR CROWDED ROOM.    (!) TROUBLE UNDERSTANDING SOFT OR WHISPERED SPEECH.   Would you like a referral for hearing testing? No       Multiple values from one day are sorted in reverse-chronological order         2025   Driving Risk Screening   Patient/family members have concerns about driving No         2025   General Alertness/Fatigue Screening   Have you been more tired than usual lately? No         2025   Urinary Incontinence Screening   Bothered by leaking urine in past 6 months Yes         Today's PHQ-2 Score:       2025     3:50 PM   PHQ-2 (  [employer])   Q1: Little interest or pleasure in doing things 0    Q2: Feeling down, depressed or hopeless 0    PHQ-2 Score 0    Q1: Little interest or pleasure in doing things Not at all   Q2: Feeling down, depressed or hopeless Not at all   PHQ-2 Score 0       Proxy-reported           2025   Substance Use   Alcohol more than 3/day or more than 7/wk No   Do you have a current opioid prescription? No   How severe/bad is pain from 1 to 10? 0/10 (No Pain)   Do you use any other substances recreationally? No     Social History     Tobacco Use    Smoking status: Former     Current packs/day: 0.00     Average packs/day: 1 pack/day for 7.0 years (7.0 ttl pk-yrs)     Types: Cigarettes     Start date: 1961     Quit date: 1968     Years since quittin.6    Smokeless tobacco: Never    Vaping Use    Vaping status: Never Used   Substance Use Topics    Alcohol use: Yes     Comment: minimal    Drug use: No         Fracture Risk Assessment Tool  Link to Frax Calculator  Use the information below to complete the Frax calculator  : 1945  Sex: male  Weight (kg): 84 kg (actual weight)  Height (cm): 169.5 cm  Previous Fragility Fracture:  No  History of parent with fractured hip:  No  Current Smoking:  No  Patient has been on glucocorticoids for more than 3 months (5mg/day or more): No  Rheumatoid Arthritis on Problem List:  No  Secondary Osteoporosis on Problem List:  No  Consumes 3 or more units of alcohol per day: No  Femoral Neck BMD (g/cm2)            Reviewed and updated as needed this visit by Provider                    No past medical history on file.  Past Surgical History:   Procedure Laterality Date    COLONOSCOPY      negative     COLONOSCOPY  2011    Procedure:COLONOSCOPY; Colonoscopy; Surgeon:DARCIE FISHER; Location:[facility]    COLONOSCOPY N/A 8/10/2016    Procedure: COLONOSCOPY;  Surgeon: Morales Renteria MD;  Location: [facility]    COLONOSCOPY N/A 10/5/2023    Procedure: COLONOSCOPY, WITH POLYPECTOMY AND BIOPSY;  Surgeon: Emmanuel Kaiser MD;  [facility]    CV CORONARY ANGIOGRAM N/A 2025    Procedure: Coronary Angiogram;  Surgeon: Johana Miller MD;  Location:  [facility]    LAPAROSCOPIC HERNIORRHAPHY INGUINAL BILATERAL Bilateral 2019    Procedure: Laparoscopic bilateral inguinal hernia repair and open umbilical hernia repair;  Surgeon: Mckinley Colindres MD;  Location: [facility]     Patient Active Problem List   Diagnosis    Benign neoplasm of colon    Hyperlipidemia LDL goal <130    Herpes zoster    Diverticulosis of large intestine    Family history of colon cancer    Family history of malignant neoplasm of breast    Essential hypertension with goal blood pressure less than 140/90    Dermatitis    Chronic vasomotor rhinitis    Family  history of rectal cancer    Heart failure with reduced ejection fraction (H)    Status post coronary angiogram    Abnormal nuclear stress test    Nonischemic cardiomyopathy (H)     Past Surgical History:   Procedure Laterality Date    COLONOSCOPY  2001    negative     COLONOSCOPY  2011    Procedure:COLONOSCOPY; Colonoscopy; Surgeon:DARCIE FISHER; Location:[facility]    COLONOSCOPY N/A 8/10/2016    Procedure: COLONOSCOPY;  Surgeon: Morales Renteria MD;  Location: [facility]    COLONOSCOPY N/A 10/5/2023    Procedure: COLONOSCOPY, WITH POLYPECTOMY AND BIOPSY;  Surgeon: Emmanuel Kaiser MD;  Location: [facility]    CV CORONARY ANGIOGRAM N/A 2025    Procedure: Coronary Angiogram;  Surgeon: Johana Miller MD;  Location: [facility] CARDIAC CATH LAB    LAPAROSCOPIC HERNIORRHAPHY INGUINAL BILATERAL Bilateral 2019    Procedure: Laparoscopic bilateral inguinal hernia repair and open umbilical hernia repair;  Surgeon: Mckinley Colindres MD;  Location: [facility]       Social History     Tobacco Use    Smoking status: Former     Current packs/day: 0.00     Average packs/day: 1 pack/day for 7.0 years (7.0 ttl pk-yrs)     Types: Cigarettes     Start date: 1961     Quit date: 1968     Years since quittin.6    Smokeless tobacco: Never   Substance Use Topics    Alcohol use: Yes     Comment: minimal     Family History   Problem Relation Age of Onset    Hypertension Mother     Cerebrovascular Disease Mother          of a stroke    Cancer - colorectal Mother         age 70s    Prostate Cancer Father         had prostate removed    Cancer Brother         bladder cancer    Diabetes Maternal Grandmother     Colon Cancer Daughter 53        colon cancer surger    Breast Cancer Daughter     Asthma No family hx of     C.A.D. No family hx of     Melanoma No family hx of          Current Outpatient Medications   Medication Sig Dispense Refill    apixaban ANTICOAGULANT (ELIQUIS) 5 MG tablet Take 1 tablet (5 mg) by  mouth 2 times daily. 90 tablet 3    finasteride (PROSCAR) 5 MG tablet Take 1 tablet (5 mg) by mouth daily. To take until prostate surgery to reduce risk of bleeding 30 tablet 1    fluticasone (FLONASE) 50 MCG/ACT nasal spray Spray 1-2 sprays into both nostrils daily as needed for rhinitis 51 g 3    losartan (COZAAR) 25 MG tablet Take 1 tablet (25 mg) by mouth daily. 90 tablet 3    lovastatin (MEVACOR) 40 MG tablet Take 1 tablet (40 mg) by mouth at bedtime. 100 tablet 2    metoprolol succinate ER (TOPROL XL) 25 MG 24 hr tablet Take 1 tablet (25 mg) by mouth every evening. 90 tablet 3    tamsulosin (FLOMAX) 0.4 MG capsule Take 1 capsule (0.4 mg) by mouth daily 90 capsule 3    triamcinolone (ARISTOCORT HP) 0.5 % external cream Apply topically twice a day as needed to affected area. Call to refill. 30 g 3     Allergies   Allergen Reactions    Nkda [No Known Drug Allergy]      Current providers sharing in care for this patient include:  Patient Care Team:  Uvaldo Olivera MD as PCP - General (Family Medicine)  Uvaldo Olivera MD as Assigned PCP  Uvaldo Olivera MD as Referring Physician (Family Medicine)  Misha Franklin MD as Physician (Otolaryngology)  Jordin Estevez DO as Physician (Surgery)  Gia Rolle PA-C as Physician Assistant (Urology)  Johana Miller MD as Assigned Heart and Vascular Provider  Tata Ferris MD as Assigned Surgical Provider    The following health maintenance items are reviewed in Epic and correct as of today:  Health Maintenance   Topic Date Due    HF ACTION PLAN  Never done    MEDICARE ANNUAL WELLNESS VISIT  06/27/2025    ANNUAL REVIEW OF HM ORDERS  04/16/2026    BMP  05/16/2026    CBC  05/16/2026    ALT  06/17/2026    LIPID  06/17/2026    PSA  06/17/2026    FALL RISK ASSESSMENT  06/18/2026    COLORECTAL CANCER SCREENING  10/05/2028    ADVANCE CARE PLANNING  06/17/2030    DTAP/TDAP/TD VACCINE (3 - Td or Tdap) 07/28/2033     "TSH W/FREE T4 REFLEX  Completed    PHQ-2 (once per calendar year)  Completed    INFLUENZA VACCINE  Completed    PNEUMOCOCCAL VACCINE 50+ YEARS  Completed    ZOSTER VACCINE  Completed    RSV VACCINE  Completed    COVID-19 VACCINE  Completed    HPV VACCINE  Aged Out    MENINGITIS VACCINE  Aged Out       Review of Systems  Constitutional, HEENT, cardiovascular, pulmonary, GI, , musculoskeletal, neuro, skin, endocrine and psych systems are negative, except as otherwise noted.     Objective    Exam  /66 (BP Location: Right arm, Patient Position: Chair, Cuff Size: Adult Regular)   Pulse 93   Temp 98  F (36.7  C) (Tympanic)   Resp 20   Ht 1.695 m (5' 6.75\")   Wt 84 kg (185 lb 1.6 oz)   SpO2 94%   BMI 29.21 kg/m     Estimated body mass index is 29.21 kg/m  as calculated from the following:    Height as of this encounter: 1.695 m (5' 6.75\").    Weight as of this encounter: 84 kg (185 lb 1.6 oz).    Physical Exam  GENERAL APPEARANCE: overweight, ambulatory w/o assist, alert and no distress  EYES: pink conj, no icterus, PERRL, EOMI  HENT: ear canals with somewhat dry skin at the opening with scant flakyiness;  TM's normal bilaterally, nose clear,  mouth without ulcers or lesions, oropharynx clear and oral mucous membranes moist  NECK: no adenopathy, no asymmetry, masses, or scars and thyroid normal to palpation  RESP: lungs clear to auscultation - no rales, rhonchi or wheezes  CV: normal rate, regular rhythm, no murmur,   ABDOMEN: soft, nontender, no hepatosplenomegaly, no masses and bowel sounds normal  DIRECT RECTAL EXAM: deferred  MS: no musculoskeletal defects are noted and gait is age appropriate without ataxia; no edema  SKIN: no suspicious lesions or rashes  NEURO: Normal strength and tone, sensory exam grossly normal, mentation intact and speech normal         6/18/2025   Mini Cog   Clock Draw Score 2 Normal   3 Item Recall 3 objects recalled   Mini Cog Total Score 5              Signed Electronically " by: Uvaldo Olivera MD

## 2025-06-18 NOTE — H&P (VIEW-ONLY)
"Preventive Care Visit  [facility]  Uvaldo Olivera MD, Family Medicine  Jun 18, 2025      Assessment & Plan     Encounter for Medicare annual wellness exam  Patient was advised on recommended screening and preventive health recommendations.  He verbalized understanding and agreed to the plans below.     Chronic vasomotor rhinitis  Stable on flonase per patient.    - fluticasone (FLONASE) 50 MCG/ACT nasal spray  Dispense: 51 g; Refill: 3  - OFFICE/OUTPT VISIT,EST,LEVL IV    Benign prostatic hyperplasia with incomplete bladder emptying  Having prostate surgery soon per urology.  - tamsulosin (FLOMAX) 0.4 MG capsule  Dispense: 90 capsule; Refill: 3  - OFFICE/OUTPT VISIT,EST,LEVL IV    Rash and other nonspecific skin eruption  Rare use per patient.   Return precautions discussed and given to patient.   - triamcinolone (ARISTOCORT HP) 0.5 % external cream  Dispense: 15 g; Refill: 2  - OFFICE/OUTPT VISIT,EST,LEVL IV    Dermatitis of both ear canals  Mild scaly EAM lining today.  Advised sparing use of low potency corticosteroid topically.  Return precautions discussed and given to patient.   - hydrocortisone (CORTAID) 1 % external cream      Patient has been advised of split billing requirements and indicates understanding: Yes        BMI  Estimated body mass index is 29.21 kg/m  as calculated from the following:    Height as of this encounter: 1.695 m (5' 6.75\").    Weight as of this encounter: 84 kg (185 lb 1.6 oz).   Weight management plan: Discussed healthy diet and exercise guidelines  Reviewed preventive health counseling, as reflected in patient instructions  Counseling  Appropriate preventive services were addressed with this patient via screening, questionnaire, or discussion as appropriate for fall prevention, nutrition, physical activity, Tobacco-use cessation, social engagement, weight loss and cognition.  Checklist reviewing preventive services available has been given to the " patient.  Reviewed patient's diet, addressing concerns and/or questions.   The patient was provided with written information regarding signs of hearing loss.   Information on urinary incontinence and treatment options given to patient.         Follow-up   Return in about 1 year (around 6/18/2026) for In-person visit for next wellness exam.     Follow-up Visit   Expected date:  Jun 25, 2026 (Approximate)      Follow Up Appointment Details:     Follow-up with whom?: PCP    Follow-Up for what?: Medicare Wellness    Welcome or Annual?: Annual Wellness    How?: In Person                 Subjective   Jung is a 79 year old, presenting for the following:  Physical        6/18/2025     3:50 PM   Additional Questions   Roomed by Meron ANGUIANO   Accompanied by self          HPI         Has had labs completed yesterday.    Ears intermittently itch in the opening of the canals.  Infrequent.  No treatment.  Not itching today.    Advance Care Planning    Document on file is a Health Care Directive or POLST.        6/18/2025   General Health   How would you rate your overall physical health? Good   Feel stress (tense, anxious, or unable to sleep) Not at all         6/18/2025   Nutrition   Diet: Regular (no restrictions)         6/18/2025   Exercise   Days per week of moderate/strenous exercise 5 days   Average minutes spent exercising at this level 20 min         6/18/2025   Social Factors   Frequency of gathering with friends or relatives Once a week   Worry food won't last until get money to buy more No   Food not last or not have enough money for food? No   Do you have housing? (Housing is defined as stable permanent housing and does not include staying outside in a car, in a tent, in an abandoned building, in an overnight shelter, or couch-surfing.) Yes   Are you worried about losing your housing? No   Lack of transportation? No   Unable to get utilities (heat,electricity)? No         6/18/2025   Fall Risk   Fallen 2 or more times in  the past year? No    Trouble with walking or balance? No        Proxy-reported          2025   Activities of Daily Living- Home Safety   Needs help with the following daily activites None of the above   Safety concerns in the home None of the above         2025   Dental   Dentist two times every year? Yes         2025   Hearing Screening   Hearing concerns? (!) I NEED TO ASK PEOPLE TO SPEAK UP OR REPEAT THEMSELVES.    (!) IT'S HARDER TO UNDERSTAND WOMEN'S VOICES THAN MEN'S VOICES.    (!) IT'S HARD TO FOLLOW A CONVERSATION IN A NOISY RESTAURANT OR CROWDED ROOM.    (!) TROUBLE UNDERSTANDING SOFT OR WHISPERED SPEECH.   Would you like a referral for hearing testing? No       Multiple values from one day are sorted in reverse-chronological order         2025   Driving Risk Screening   Patient/family members have concerns about driving No         2025   General Alertness/Fatigue Screening   Have you been more tired than usual lately? No         2025   Urinary Incontinence Screening   Bothered by leaking urine in past 6 months Yes         Today's PHQ-2 Score:       2025     3:50 PM   PHQ-2 (  [employer])   Q1: Little interest or pleasure in doing things 0    Q2: Feeling down, depressed or hopeless 0    PHQ-2 Score 0    Q1: Little interest or pleasure in doing things Not at all   Q2: Feeling down, depressed or hopeless Not at all   PHQ-2 Score 0       Proxy-reported           2025   Substance Use   Alcohol more than 3/day or more than 7/wk No   Do you have a current opioid prescription? No   How severe/bad is pain from 1 to 10? 0/10 (No Pain)   Do you use any other substances recreationally? No     Social History     Tobacco Use    Smoking status: Former     Current packs/day: 0.00     Average packs/day: 1 pack/day for 7.0 years (7.0 ttl pk-yrs)     Types: Cigarettes     Start date: 1961     Quit date: 1968     Years since quittin.6    Smokeless tobacco: Never    Vaping Use    Vaping status: Never Used   Substance Use Topics    Alcohol use: Yes     Comment: minimal    Drug use: No         Fracture Risk Assessment Tool  Link to Frax Calculator  Use the information below to complete the Frax calculator  : 1945  Sex: male  Weight (kg): 84 kg (actual weight)  Height (cm): 169.5 cm  Previous Fragility Fracture:  No  History of parent with fractured hip:  No  Current Smoking:  No  Patient has been on glucocorticoids for more than 3 months (5mg/day or more): No  Rheumatoid Arthritis on Problem List:  No  Secondary Osteoporosis on Problem List:  No  Consumes 3 or more units of alcohol per day: No  Femoral Neck BMD (g/cm2)            Reviewed and updated as needed this visit by Provider                    No past medical history on file.  Past Surgical History:   Procedure Laterality Date    COLONOSCOPY      negative     COLONOSCOPY  2011    Procedure:COLONOSCOPY; Colonoscopy; Surgeon:DARCIE FISHER; Location:[facility]    COLONOSCOPY N/A 8/10/2016    Procedure: COLONOSCOPY;  Surgeon: Morales Renteria MD;  Location: [facility]    COLONOSCOPY N/A 10/5/2023    Procedure: COLONOSCOPY, WITH POLYPECTOMY AND BIOPSY;  Surgeon: Emmanuel Kaiser MD;  Location: [facility]    CV CORONARY ANGIOGRAM N/A 2025    Procedure: Coronary Angiogram;  Surgeon: Johana Miller MD;  Location:  [facility]    LAPAROSCOPIC HERNIORRHAPHY INGUINAL BILATERAL Bilateral 2019    Procedure: Laparoscopic bilateral inguinal hernia repair and open umbilical hernia repair;  Surgeon: Mckinley Colindres MD;  Location: [facility]     Patient Active Problem List   Diagnosis    Benign neoplasm of colon    Hyperlipidemia LDL goal <130    Herpes zoster    Diverticulosis of large intestine    Family history of colon cancer    Family history of malignant neoplasm of breast    Essential hypertension with goal blood pressure less than 140/90    Dermatitis    Chronic vasomotor rhinitis    Family  history of rectal cancer    Heart failure with reduced ejection fraction (H)    Status post coronary angiogram    Abnormal nuclear stress test    Nonischemic cardiomyopathy (H)     Past Surgical History:   Procedure Laterality Date    COLONOSCOPY  2001    negative     COLONOSCOPY  2011    Procedure:COLONOSCOPY; Colonoscopy; Surgeon:DARCIE FISHER; Location:[facility]    COLONOSCOPY N/A 8/10/2016    Procedure: COLONOSCOPY;  Surgeon: Morales Renteria MD;  Location: [facility]    COLONOSCOPY N/A 10/5/2023    Procedure: COLONOSCOPY, WITH POLYPECTOMY AND BIOPSY;  Surgeon: Emmanuel Kaiser MD;  Location: [facility]    CV CORONARY ANGIOGRAM N/A 2025    Procedure: Coronary Angiogram;  Surgeon: Johana Miller MD;  Location: [facility] CARDIAC C[facility]    LAPAROSCOPIC HERNIORRHAPHY INGUINAL BILATERAL Bilateral 2019    Procedure: Laparoscopic bilateral inguinal hernia repair and open umbilical hernia repair;  Surgeon: Mckinley Colindres MD;  Location: [facility]       Social History     Tobacco Use    Smoking status: Former     Current packs/day: 0.00     Average packs/day: 1 pack/day for 7.0 years (7.0 ttl pk-yrs)     Types: Cigarettes     Start date: 1961     Quit date: 1968     Years since quittin.6    Smokeless tobacco: Never   Substance Use Topics    Alcohol use: Yes     Comment: minimal     Family History   Problem Relation Age of Onset    Hypertension Mother     Cerebrovascular Disease Mother          of a stroke    Cancer - colorectal Mother         age 70s    Prostate Cancer Father         had prostate removed    Cancer Brother         bladder cancer    Diabetes Maternal Grandmother     Colon Cancer Daughter 53        colon cancer surger    Breast Cancer Daughter     Asthma No family hx of     C.A.D. No family hx of     Melanoma No family hx of          Current Outpatient Medications   Medication Sig Dispense Refill    apixaban ANTICOAGULANT (ELIQUIS) 5 MG tablet Take 1 tablet (5 mg) by  mouth 2 times daily. 90 tablet 3    finasteride (PROSCAR) 5 MG tablet Take 1 tablet (5 mg) by mouth daily. To take until prostate surgery to reduce risk of bleeding 30 tablet 1    fluticasone (FLONASE) 50 MCG/ACT nasal spray Spray 1-2 sprays into both nostrils daily as needed for rhinitis 51 g 3    losartan (COZAAR) 25 MG tablet Take 1 tablet (25 mg) by mouth daily. 90 tablet 3    lovastatin (MEVACOR) 40 MG tablet Take 1 tablet (40 mg) by mouth at bedtime. 100 tablet 2    metoprolol succinate ER (TOPROL XL) 25 MG 24 hr tablet Take 1 tablet (25 mg) by mouth every evening. 90 tablet 3    tamsulosin (FLOMAX) 0.4 MG capsule Take 1 capsule (0.4 mg) by mouth daily 90 capsule 3    triamcinolone (ARISTOCORT HP) 0.5 % external cream Apply topically twice a day as needed to affected area. Call to refill. 30 g 3     Allergies   Allergen Reactions    Nkda [No Known Drug Allergy]      Current providers sharing in care for this patient include:  Patient Care Team:  Uvaldo Olivera MD as PCP - General (Family Medicine)  Uvaldo Olivera MD as Assigned PCP  Uvaldo Olivera MD as Referring Physician (Family Medicine)  Misha Franklin MD as Physician (Otolaryngology)  Jordin Estevez DO as Physician (Surgery)  Gia Rolle PA-C as Physician Assistant (Urology)  Johana Miller MD as Assigned Heart and Vascular Provider  Tata Ferris MD as Assigned Surgical Provider    The following health maintenance items are reviewed in Epic and correct as of today:  Health Maintenance   Topic Date Due    HF ACTION PLAN  Never done    MEDICARE ANNUAL WELLNESS VISIT  06/27/2025    ANNUAL REVIEW OF HM ORDERS  04/16/2026    BMP  05/16/2026    CBC  05/16/2026    ALT  06/17/2026    LIPID  06/17/2026    PSA  06/17/2026    FALL RISK ASSESSMENT  06/18/2026    COLORECTAL CANCER SCREENING  10/05/2028    ADVANCE CARE PLANNING  06/17/2030    DTAP/TDAP/TD VACCINE (3 - Td or Tdap) 07/28/2033     "TSH W/FREE T4 REFLEX  Completed    PHQ-2 (once per calendar year)  Completed    INFLUENZA VACCINE  Completed    PNEUMOCOCCAL VACCINE 50+ YEARS  Completed    ZOSTER VACCINE  Completed    RSV VACCINE  Completed    COVID-19 VACCINE  Completed    HPV VACCINE  Aged Out    MENINGITIS VACCINE  Aged Out       Review of Systems  Constitutional, HEENT, cardiovascular, pulmonary, GI, , musculoskeletal, neuro, skin, endocrine and psych systems are negative, except as otherwise noted.     Objective    Exam  /66 (BP Location: Right arm, Patient Position: Chair, Cuff Size: Adult Regular)   Pulse 93   Temp 98  F (36.7  C) (Tympanic)   Resp 20   Ht 1.695 m (5' 6.75\")   Wt 84 kg (185 lb 1.6 oz)   SpO2 94%   BMI 29.21 kg/m     Estimated body mass index is 29.21 kg/m  as calculated from the following:    Height as of this encounter: 1.695 m (5' 6.75\").    Weight as of this encounter: 84 kg (185 lb 1.6 oz).    Physical Exam  GENERAL APPEARANCE: overweight, ambulatory w/o assist, alert and no distress  EYES: pink conj, no icterus, PERRL, EOMI  HENT: ear canals with somewhat dry skin at the opening with scant flakyiness;  TM's normal bilaterally, nose clear,  mouth without ulcers or lesions, oropharynx clear and oral mucous membranes moist  NECK: no adenopathy, no asymmetry, masses, or scars and thyroid normal to palpation  RESP: lungs clear to auscultation - no rales, rhonchi or wheezes  CV: normal rate, regular rhythm, no murmur,   ABDOMEN: soft, nontender, no hepatosplenomegaly, no masses and bowel sounds normal  DIRECT RECTAL EXAM: deferred  MS: no musculoskeletal defects are noted and gait is age appropriate without ataxia; no edema  SKIN: no suspicious lesions or rashes  NEURO: Normal strength and tone, sensory exam grossly normal, mentation intact and speech normal         6/18/2025   Mini Cog   Clock Draw Score 2 Normal   3 Item Recall 3 objects recalled   Mini Cog Total Score 5              Signed Electronically " by: Uvaldo Olivera MD

## 2025-06-18 NOTE — PATIENT INSTRUCTIONS
Patient Education     Medications needing refill has been sent to pharmacy,.    Be consistent with low salt, low trans fat and low saturated fat diet.  Eat food rich in omega-3-fatty acids as you tolerate. (salmon, olive oil)  Eat 5 cups of vegetables, fruits and whole grains per day.  Limit starchy food (white rice, white bread, white pasta, white potatoes) to less than a cup per meal.  Minimize sweets, junk food and fastfood. Limit soda beverages to one serving per day; best to avoid it altogether though.    Exercise: moderate intensity sustained for at least 30 mins per episode, goal of 150 mins per week at least  Combine cardiovascular and resistance exercises.  These exercise recommendations are in addition to your daily activity at work or home.    Repeat blood tests in a year.    If with itching in ear canal, use smallest finger to gently apply small amount of over the counter hydrocortisone ointment to the opening of the ear twice a day for 2-3 days up to maximum of 5 days.,  If worsening, see a provider again.    Preventative Care Visits include: Yearly physicals, Well-child visits, Welcome to Medicare visits, & Medicare yearly wellness exams.    The purpose of these visits is to discuss your medical history and prevent health problems before you are sick.  You may need to pay a copay, coinsurance or deductible if your visit today includes testing or treating for a new or existing condition.    Additional charges may be incurred for today's visit. If you have questions about what your insurance plan covers, please contact your Insurance Company's member service department.  If you have questions specific to a bill you have already received from [facility], please contact the Corporate Billing Office at [phone].    Preventive Care Advice   This is general advice given by our system to help you stay healthy. However, your care team may have specific advice just for you. Please talk to your care team about  your preventive care needs.  Nutrition  Eat 5 or more servings of fruits and vegetables each day.  Try wheat bread, brown rice and whole grain pasta (instead of white bread, rice, and pasta).  Get enough calcium and vitamin D. Check the label on foods and aim for 100% of the RDA (recommended daily allowance).  Lifestyle  Exercise at least 150 minutes each week  (30 minutes a day, 5 days a week).  Do muscle strengthening activities 2 days a week. These help control your weight and prevent disease.  No smoking.  Wear sunscreen to prevent skin cancer.  Have a dental exam and cleaning every 6 months.  Yearly exams  See your health care team every year to talk about:  Any changes in your health.  Any medicines your care team has prescribed.  Preventive care, family planning, and ways to prevent chronic diseases.  Shots (vaccines)   HPV shots (up to age 26), if you've never had them before.  Hepatitis B shots (up to age 59), if you've never had them before.  COVID-19 shot: Get this shot when it's due.  Flu shot: Get a flu shot every year.  Tetanus shot: Get a tetanus shot every 10 years.  Pneumococcal, hepatitis A, and RSV shots: Ask your care team if you need these based on your risk.  Shingles shot (for age 50 and up)  General health tests  Diabetes screening:  Starting at age 35, Get screened for diabetes at least every 3 years.  If you are younger than age 35, ask your care team if you should be screened for diabetes.  Cholesterol test: At age 39, start having a cholesterol test every 5 years, or more often if advised.  Bone density scan (DEXA): At age 50, ask your care team if you should have this scan for osteoporosis (brittle bones).  Hepatitis C: Get tested at least once in your life.  STIs (sexually transmitted infections)  Before age 24: Ask your care team if you should be screened for STIs.  After age 24: Get screened for STIs if you're at risk. You are at risk for STIs (including HIV) if:  You are sexually  active with more than one person.  You don't use condoms every time.  You or a partner was diagnosed with a sexually transmitted infection.  If you are at risk for HIV, ask about PrEP medicine to prevent HIV.  Get tested for HIV at least once in your life, whether you are at risk for HIV or not.  Cancer screening tests  Cervical cancer screening: If you have a cervix, begin getting regular cervical cancer screening tests starting at age 21.  Breast cancer scan (mammogram): If you've ever had breasts, begin having regular mammograms starting at age 40. This is a scan to check for breast cancer.  Colon cancer screening: It is important to start screening for colon cancer at age 45.  Have a colonoscopy test every 10 years (or more often if you're at risk) Or, ask your provider about stool tests like a FIT test every year or Cologuard test every 3 years.  To learn more about your testing options, visit:   .  For help making a decision, visit:   https://bit.ly/bf01580.  Prostate cancer screening test: If you have a prostate, ask your care team if a prostate cancer screening test (PSA) at age 55 is right for you.  Lung cancer screening: If you are a current or former smoker ages 50 to 80, ask your care team if ongoing lung cancer screenings are right for you.  For informational purposes only. Not to replace the advice of your health care provider. Copyright   2023 [facility] [employer]. All rights reserved. Clinically reviewed by the [facility] Transitions Program. [employer] [id number] - REV 01/24.  Hearing Loss: Care Instructions  Overview     Hearing loss is a sudden or slow decrease in how well you hear. It can range from slight to profound. Permanent hearing loss can occur with aging. It also can happen when you are exposed long-term to loud noise. Examples include listening to loud music, riding motorcycles, or being around other loud machines.  Hearing loss can affect your work and home life. It can make you  feel lonely or depressed. You may feel that you have lost your independence. But hearing aids and other devices can help you hear better and feel connected to others.  Follow-up care is a key part of your treatment and safety. Be sure to make and go to all appointments, and call your doctor if you are having problems. It's also a good idea to know your test results and keep a list of the medicines you take.  How can you care for yourself at home?  Avoid loud noises whenever possible. This helps keep your hearing from getting worse.  Always wear hearing protection around loud noises.  Wear a hearing aid as directed.  A professional can help you pick a hearing aid that will work best for you.  You can also get hearing aids over the counter for mild to moderate hearing loss.  Have hearing tests as your doctor suggests. They can show whether your hearing has changed. Your hearing aid may need to be adjusted.  Use other devices as needed. These may include:  Telephone amplifiers and hearing aids that can connect to a television, stereo, radio, or microphone.  Devices that use lights or vibrations. These alert you to the doorbell, a ringing telephone, or a baby monitor.  Television closed-captioning. This shows the words at the bottom of the screen. Most new TVs can do this.  TTY (text telephone). This lets you type messages back and forth on the telephone instead of talking or listening. These devices are also called TDD. When messages are typed on the keyboard, they are sent over the phone line to a receiving TTY. The message is shown on a monitor.  Use text messaging, social media, and email if it is hard for you to communicate by telephone.  Try to learn a listening technique called speechreading. It is not lipreading. You pay attention to people's gestures, expressions, posture, and tone of voice. These clues can help you understand what a person is saying. Face the person you are talking to, and have them face you.  "Make sure the lighting is good. You need to see the other person's face clearly.  Think about counseling if you need help to adjust to your hearing loss.  When should you call for help?  Watch closely for changes in your health, and be sure to contact your doctor if:    You think your hearing is getting worse.     You have new symptoms, such as dizziness or nausea.   Where can you learn more?  Go to https://www.[employer].net/patiented  Enter R798 in the search box to learn more about \"Hearing Loss: Care Instructions.\"  Current as of: October 27, 2024  Content Version: 14.5    7890-8057 [employer].   Care instructions adapted under license by your healthcare professional. If you have questions about a medical condition or this instruction, always ask your healthcare professional. [employer] disclaims any warranty or liability for your use of this information.    Bladder Training: Care Instructions  Your Care Instructions     Bladder training is used to treat urge incontinence and stress incontinence. Urge incontinence means that the need to urinate comes on so fast that you can't get to a toilet in time. Stress incontinence means that you leak urine because of pressure on your bladder. For example, it may happen when you laugh, cough, or lift something heavy.  Bladder training can increase how long you can wait before you have to urinate. It can also help your bladder hold more urine. And it can give you better control over the urge to urinate.  It is important to remember that bladder training takes a few weeks to a few months to make a difference. You may not see results right away, but don't give up.  Follow-up care is a key part of your treatment and safety. Be sure to make and go to all appointments, and call your doctor if you are having problems. It's also a good idea to know your test results and keep a list of the medicines you take.  How can you care for yourself at home?  Work with " your doctor to come up with a bladder training program that is right for you. You may use one or more of the following methods.  Delayed urination  In the beginning, try to keep from urinating for 5 minutes after you first feel the need to go.  While you wait, take deep, slow breaths to relax. Kegel exercises can also help you delay the need to go to the bathroom.  After some practice, when you can easily wait 5 minutes to urinate, try to wait 10 minutes before you urinate.  Slowly increase the waiting period until you are able to control when you have to urinate.  Scheduled urination  Empty your bladder when you first wake up in the morning.  Schedule times throughout the day when you will urinate.  Start by going to the bathroom every hour, even if you don't need to go.  Slowly increase the time between trips to the bathroom.  When you have found a schedule that works well for you, keep doing it.  If you wake up during the night and have to urinate, do it. Apply your schedule to waking hours only.  Kegel exercises  These tighten and strengthen pelvic muscles, which can help you control the flow of urine. (If doing these exercises causes pain, stop doing them and talk with your doctor.) To do Kegel exercises:  Squeeze your muscles as if you were trying not to pass gas. Or squeeze your muscles as if you were stopping the flow of urine. Your belly, legs, and buttocks shouldn't move.  Hold the squeeze for 3 seconds, then relax for 5 to 10 seconds.  Start with 3 seconds, then add 1 second each week until you are able to squeeze for 10 seconds.  Repeat the exercise 10 times a session. Do 3 to 8 sessions a day.  When should you call for help?  Watch closely for changes in your health, and be sure to contact your doctor if:    Your incontinence is getting worse.     You do not get better as expected.   Where can you learn more?  Go to https://www.healthwise.net/patiented  Enter V684 in the search box to learn more about  "\"Bladder Training: Care Instructions.\"  Current as of: April 30, 2024  Content Version: 14.5 2024-2025 [employer].   Care instructions adapted under license by your healthcare professional. If you have questions about a medical condition or this instruction, always ask your healthcare professional. [employer] disclaims any warranty or liability for your use of this information.       "

## 2025-06-25 ENCOUNTER — TELEPHONE (OUTPATIENT)
Dept: UROLOGY | Facility: CLINIC | Age: 80
End: 2025-06-25
Payer: COMMERCIAL

## 2025-06-25 DIAGNOSIS — N40.1 BENIGN PROSTATIC HYPERPLASIA WITH INCOMPLETE BLADDER EMPTYING: ICD-10-CM

## 2025-06-25 DIAGNOSIS — R39.14 BENIGN PROSTATIC HYPERPLASIA WITH INCOMPLETE BLADDER EMPTYING: ICD-10-CM

## 2025-06-25 NOTE — TELEPHONE ENCOUNTER
[facility] Call Center    Phone Message    May a detailed message be left on voicemail: yes     Reason for Call: Medication Refill Request    Has the patient contacted the pharmacy for the refill? Yes   Name of medication being requested: finasteride (PROSCAR) 5 MG tablet [[id number]] (Order [id number]   Provider who prescribed the medication: Josy  Pharmacy: [facility]  Date medication is needed: asap       Action Taken: Other: urology    Travel Screening: Not Applicable     Date of Service:

## 2025-06-26 RX ORDER — FINASTERIDE 5 MG/1
5 TABLET, FILM COATED ORAL DAILY
Qty: 30 TABLET | Refills: 0 | Status: SHIPPED | OUTPATIENT
Start: 2025-06-26

## 2025-06-26 NOTE — TELEPHONE ENCOUNTER
Finasteride filled as pt's surgery was rescheduled to later date due to provider out.  Pt notified of script sent.   Gemma CONTE RN BSN PHN  [facility]

## 2025-06-26 NOTE — TELEPHONE ENCOUNTER
[facility] Call Center    Phone Message    May a detailed message be left on voicemail: no     Reason for Call: Other: pt calling again about this     Action Taken: Other: urology    Travel Screening: Not Applicable     Date of Service:

## 2025-07-07 ENCOUNTER — TELEPHONE (OUTPATIENT)
Dept: UROLOGY | Facility: CLINIC | Age: 80
End: 2025-07-07
Payer: COMMERCIAL

## 2025-07-07 NOTE — TELEPHONE ENCOUNTER
[facility] Call Center    Phone Message    May a detailed message be left on voicemail: yes     Reason for Call: Pt is calling asking what medications he should continue/stop taking prior to scheduled surgery on 07/11/25. Please call pt to discuss ASAP.    Action Taken: Other: [place] - Urology    Travel Screening: Not Applicable     Date of Service:

## 2025-07-11 ENCOUNTER — HOSPITAL ENCOUNTER (OUTPATIENT)
Facility: CLINIC | Age: 80
Discharge: HOME | End: 2025-07-12
Attending: STUDENT IN AN ORGANIZED HEALTH CARE EDUCATION/TRAINING PROGRAM | Admitting: STUDENT IN AN ORGANIZED HEALTH CARE EDUCATION/TRAINING PROGRAM
Payer: COMMERCIAL

## 2025-07-11 DIAGNOSIS — R39.14 BENIGN PROSTATIC HYPERPLASIA WITH INCOMPLETE BLADDER EMPTYING: Primary | ICD-10-CM

## 2025-07-11 DIAGNOSIS — I48.0 PAROXYSMAL ATRIAL FIBRILLATION (H): ICD-10-CM

## 2025-07-11 DIAGNOSIS — N40.1 BENIGN PROSTATIC HYPERPLASIA WITH INCOMPLETE BLADDER EMPTYING: Primary | ICD-10-CM

## 2025-07-11 PROBLEM — N40.0 BPH (BENIGN PROSTATIC HYPERPLASIA): Status: ACTIVE | Noted: 2025-07-11

## 2025-07-11 LAB
ABO + RH BLD: NORMAL
BLD GP AB SCN SERPL QL: NEGATIVE
ERYTHROCYTE [DISTWIDTH] IN BLOOD BY AUTOMATED COUNT: 12 % (ref 10–15)
GLUCOSE BLDC GLUCOMTR-MCNC: 85 MG/DL (ref 70–99)
HCT VFR BLD AUTO: 38.2 % (ref 40–53)
HGB BLD-MCNC: 13.1 G/DL (ref 13.3–17.7)
MCH RBC QN AUTO: 31.3 PG (ref 26.5–33)
MCHC RBC AUTO-ENTMCNC: 34.3 G/DL (ref 31.5–36.5)
MCV RBC AUTO: 91 FL (ref 78–100)
PLATELET # BLD AUTO: 142 10E3/UL (ref 150–450)
RBC # BLD AUTO: 4.19 10E6/UL (ref 4.4–5.9)
SPECIMEN EXP DATE BLD: NORMAL
WBC # BLD AUTO: 6.5 10E3/UL (ref 4–11)

## 2025-07-11 PROCEDURE — 250N000025 HC SEVOFLURANE, PER MIN: Performed by: STUDENT IN AN ORGANIZED HEALTH CARE EDUCATION/TRAINING PROGRAM

## 2025-07-11 PROCEDURE — 250N000009 HC RX 250: Performed by: STUDENT IN AN ORGANIZED HEALTH CARE EDUCATION/TRAINING PROGRAM

## 2025-07-11 PROCEDURE — 88305 TISSUE EXAM BY PATHOLOGIST: CPT | Mod: TC | Performed by: STUDENT IN AN ORGANIZED HEALTH CARE EDUCATION/TRAINING PROGRAM

## 2025-07-11 PROCEDURE — C1769 GUIDE WIRE: HCPCS | Performed by: STUDENT IN AN ORGANIZED HEALTH CARE EDUCATION/TRAINING PROGRAM

## 2025-07-11 PROCEDURE — 258N000001 HC RX 258: Performed by: STUDENT IN AN ORGANIZED HEALTH CARE EDUCATION/TRAINING PROGRAM

## 2025-07-11 PROCEDURE — 250N000013 HC RX MED GY IP 250 OP 250 PS 637: Performed by: STUDENT IN AN ORGANIZED HEALTH CARE EDUCATION/TRAINING PROGRAM

## 2025-07-11 PROCEDURE — 258N000001 HC RX 258: Performed by: UROLOGY

## 2025-07-11 PROCEDURE — 999N000141 HC STATISTIC PRE-PROCEDURE NURSING ASSESSMENT: Performed by: STUDENT IN AN ORGANIZED HEALTH CARE EDUCATION/TRAINING PROGRAM

## 2025-07-11 PROCEDURE — 258N000003 HC RX IP 258 OP 636: Performed by: ANESTHESIOLOGY

## 2025-07-11 PROCEDURE — 250N000013 HC RX MED GY IP 250 OP 250 PS 637: Performed by: UROLOGY

## 2025-07-11 PROCEDURE — 272N000001 HC OR GENERAL SUPPLY STERILE: Performed by: STUDENT IN AN ORGANIZED HEALTH CARE EDUCATION/TRAINING PROGRAM

## 2025-07-11 PROCEDURE — 86900 BLOOD TYPING SEROLOGIC ABO: CPT | Performed by: STUDENT IN AN ORGANIZED HEALTH CARE EDUCATION/TRAINING PROGRAM

## 2025-07-11 PROCEDURE — 360N000077 HC SURGERY LEVEL 4, PER MIN: Performed by: STUDENT IN AN ORGANIZED HEALTH CARE EDUCATION/TRAINING PROGRAM

## 2025-07-11 PROCEDURE — 52649 PROSTATE LASER ENUCLEATION: CPT | Performed by: STUDENT IN AN ORGANIZED HEALTH CARE EDUCATION/TRAINING PROGRAM

## 2025-07-11 PROCEDURE — 258N000003 HC RX IP 258 OP 636: Performed by: UROLOGY

## 2025-07-11 PROCEDURE — 36415 COLL VENOUS BLD VENIPUNCTURE: CPT | Performed by: UROLOGY

## 2025-07-11 PROCEDURE — 85014 HEMATOCRIT: CPT | Performed by: UROLOGY

## 2025-07-11 PROCEDURE — 250N000009 HC RX 250: Performed by: UROLOGY

## 2025-07-11 PROCEDURE — 82962 GLUCOSE BLOOD TEST: CPT

## 2025-07-11 PROCEDURE — 710N000009 HC RECOVERY PHASE 1, LEVEL 1, PER MIN: Performed by: STUDENT IN AN ORGANIZED HEALTH CARE EDUCATION/TRAINING PROGRAM

## 2025-07-11 PROCEDURE — 250N000011 HC RX IP 250 OP 636: Performed by: UROLOGY

## 2025-07-11 PROCEDURE — 370N000017 HC ANESTHESIA TECHNICAL FEE, PER MIN: Performed by: STUDENT IN AN ORGANIZED HEALTH CARE EDUCATION/TRAINING PROGRAM

## 2025-07-11 RX ORDER — FENTANYL CITRATE 0.05 MG/ML
25 INJECTION, SOLUTION INTRAMUSCULAR; INTRAVENOUS EVERY 5 MIN PRN
Status: DISCONTINUED | OUTPATIENT
Start: 2025-07-11 | End: 2025-07-11 | Stop reason: HOSPADM

## 2025-07-11 RX ORDER — AMOXICILLIN 250 MG
1 CAPSULE ORAL DAILY
Qty: 30 TABLET | Refills: 0 | Status: SHIPPED | OUTPATIENT
Start: 2025-07-11

## 2025-07-11 RX ORDER — HYDROMORPHONE HCL IN WATER/PF 6 MG/30 ML
0.4 PATIENT CONTROLLED ANALGESIA SYRINGE INTRAVENOUS EVERY 5 MIN PRN
Status: DISCONTINUED | OUTPATIENT
Start: 2025-07-11 | End: 2025-07-11 | Stop reason: HOSPADM

## 2025-07-11 RX ORDER — ATORVASTATIN CALCIUM 10 MG/1
10 TABLET, FILM COATED ORAL AT BEDTIME
Status: DISCONTINUED | OUTPATIENT
Start: 2025-07-11 | End: 2025-07-12 | Stop reason: HOSPADM

## 2025-07-11 RX ORDER — AMOXICILLIN 250 MG
1 CAPSULE ORAL 2 TIMES DAILY
Status: DISCONTINUED | OUTPATIENT
Start: 2025-07-11 | End: 2025-07-12 | Stop reason: HOSPADM

## 2025-07-11 RX ORDER — SODIUM CHLORIDE 9 MG/ML
INJECTION, SOLUTION INTRAVENOUS CONTINUOUS
Status: ACTIVE | OUTPATIENT
Start: 2025-07-11 | End: 2025-07-12

## 2025-07-11 RX ORDER — PROCHLORPERAZINE MALEATE 5 MG/1
5 TABLET ORAL EVERY 6 HOURS PRN
Status: DISCONTINUED | OUTPATIENT
Start: 2025-07-11 | End: 2025-07-12 | Stop reason: HOSPADM

## 2025-07-11 RX ORDER — HYDROMORPHONE HCL IN WATER/PF 6 MG/30 ML
0.2 PATIENT CONTROLLED ANALGESIA SYRINGE INTRAVENOUS EVERY 5 MIN PRN
Status: DISCONTINUED | OUTPATIENT
Start: 2025-07-11 | End: 2025-07-11 | Stop reason: HOSPADM

## 2025-07-11 RX ORDER — ONDANSETRON 4 MG/1
4 TABLET, ORALLY DISINTEGRATING ORAL EVERY 6 HOURS PRN
Status: DISCONTINUED | OUTPATIENT
Start: 2025-07-11 | End: 2025-07-12 | Stop reason: HOSPADM

## 2025-07-11 RX ORDER — NALOXONE HYDROCHLORIDE 0.4 MG/ML
0.4 INJECTION, SOLUTION INTRAMUSCULAR; INTRAVENOUS; SUBCUTANEOUS
Status: DISCONTINUED | OUTPATIENT
Start: 2025-07-11 | End: 2025-07-12 | Stop reason: HOSPADM

## 2025-07-11 RX ORDER — NALOXONE HYDROCHLORIDE 0.4 MG/ML
0.2 INJECTION, SOLUTION INTRAMUSCULAR; INTRAVENOUS; SUBCUTANEOUS
Status: DISCONTINUED | OUTPATIENT
Start: 2025-07-11 | End: 2025-07-12 | Stop reason: HOSPADM

## 2025-07-11 RX ORDER — ONDANSETRON 2 MG/ML
4 INJECTION INTRAMUSCULAR; INTRAVENOUS EVERY 30 MIN PRN
Status: DISCONTINUED | OUTPATIENT
Start: 2025-07-11 | End: 2025-07-11 | Stop reason: HOSPADM

## 2025-07-11 RX ORDER — METOPROLOL SUCCINATE 25 MG/1
25 TABLET, EXTENDED RELEASE ORAL EVERY EVENING
Status: DISCONTINUED | OUTPATIENT
Start: 2025-07-11 | End: 2025-07-12 | Stop reason: HOSPADM

## 2025-07-11 RX ORDER — SODIUM CHLORIDE, SODIUM LACTATE, POTASSIUM CHLORIDE, CALCIUM CHLORIDE 600; 310; 30; 20 MG/100ML; MG/100ML; MG/100ML; MG/100ML
INJECTION, SOLUTION INTRAVENOUS CONTINUOUS
Status: DISCONTINUED | OUTPATIENT
Start: 2025-07-11 | End: 2025-07-11 | Stop reason: HOSPADM

## 2025-07-11 RX ORDER — LIDOCAINE 40 MG/G
CREAM TOPICAL
Status: DISCONTINUED | OUTPATIENT
Start: 2025-07-11 | End: 2025-07-12 | Stop reason: HOSPADM

## 2025-07-11 RX ORDER — NITROFURANTOIN 25; 75 MG/1; MG/1
100 CAPSULE ORAL 2 TIMES DAILY
Qty: 10 CAPSULE | Refills: 0 | Status: SHIPPED | OUTPATIENT
Start: 2025-07-11 | End: 2025-07-16

## 2025-07-11 RX ORDER — CEFAZOLIN SODIUM/WATER 2 G/20 ML
2 SYRINGE (ML) INTRAVENOUS
Status: COMPLETED | OUTPATIENT
Start: 2025-07-11 | End: 2025-07-11

## 2025-07-11 RX ORDER — FENTANYL CITRATE 0.05 MG/ML
50 INJECTION, SOLUTION INTRAMUSCULAR; INTRAVENOUS EVERY 5 MIN PRN
Status: DISCONTINUED | OUTPATIENT
Start: 2025-07-11 | End: 2025-07-11 | Stop reason: HOSPADM

## 2025-07-11 RX ORDER — CEFAZOLIN SODIUM 2 G/50ML
2 SOLUTION INTRAVENOUS EVERY 6 HOURS
Status: COMPLETED | OUTPATIENT
Start: 2025-07-11 | End: 2025-07-11

## 2025-07-11 RX ORDER — ONDANSETRON 4 MG/1
4 TABLET, ORALLY DISINTEGRATING ORAL EVERY 30 MIN PRN
Status: DISCONTINUED | OUTPATIENT
Start: 2025-07-11 | End: 2025-07-11 | Stop reason: HOSPADM

## 2025-07-11 RX ORDER — BISACODYL 10 MG
10 SUPPOSITORY, RECTAL RECTAL DAILY PRN
Status: DISCONTINUED | OUTPATIENT
Start: 2025-07-14 | End: 2025-07-12 | Stop reason: HOSPADM

## 2025-07-11 RX ORDER — ACETAMINOPHEN 325 MG/1
975 TABLET ORAL EVERY 8 HOURS
Status: DISCONTINUED | OUTPATIENT
Start: 2025-07-11 | End: 2025-07-12 | Stop reason: HOSPADM

## 2025-07-11 RX ORDER — LOSARTAN POTASSIUM 25 MG/1
25 TABLET ORAL DAILY
Status: DISCONTINUED | OUTPATIENT
Start: 2025-07-12 | End: 2025-07-12 | Stop reason: HOSPADM

## 2025-07-11 RX ORDER — GINSENG 100 MG
CAPSULE ORAL 3 TIMES DAILY
Status: DISCONTINUED | OUTPATIENT
Start: 2025-07-11 | End: 2025-07-12 | Stop reason: HOSPADM

## 2025-07-11 RX ORDER — PHENAZOPYRIDINE HYDROCHLORIDE 200 MG/1
200 TABLET, FILM COATED ORAL 3 TIMES DAILY PRN
Qty: 9 TABLET | Refills: 0 | Status: SHIPPED | OUTPATIENT
Start: 2025-07-11

## 2025-07-11 RX ORDER — ACETAMINOPHEN 325 MG/1
975 TABLET ORAL ONCE
Status: COMPLETED | OUTPATIENT
Start: 2025-07-11 | End: 2025-07-11

## 2025-07-11 RX ORDER — POLYETHYLENE GLYCOL 3350 17 G/17G
17 POWDER, FOR SOLUTION ORAL DAILY
Status: DISCONTINUED | OUTPATIENT
Start: 2025-07-12 | End: 2025-07-12 | Stop reason: HOSPADM

## 2025-07-11 RX ORDER — ACETAMINOPHEN 650 MG/1
650 SUPPOSITORY RECTAL ONCE
Status: COMPLETED | OUTPATIENT
Start: 2025-07-11 | End: 2025-07-11

## 2025-07-11 RX ORDER — CEFAZOLIN SODIUM/WATER 2 G/20 ML
2 SYRINGE (ML) INTRAVENOUS SEE ADMIN INSTRUCTIONS
Status: DISCONTINUED | OUTPATIENT
Start: 2025-07-11 | End: 2025-07-11 | Stop reason: HOSPADM

## 2025-07-11 RX ORDER — ONDANSETRON 2 MG/ML
4 INJECTION INTRAMUSCULAR; INTRAVENOUS EVERY 6 HOURS PRN
Status: DISCONTINUED | OUTPATIENT
Start: 2025-07-11 | End: 2025-07-12 | Stop reason: HOSPADM

## 2025-07-11 RX ORDER — NALOXONE HYDROCHLORIDE 0.4 MG/ML
0.1 INJECTION, SOLUTION INTRAMUSCULAR; INTRAVENOUS; SUBCUTANEOUS
Status: DISCONTINUED | OUTPATIENT
Start: 2025-07-11 | End: 2025-07-11 | Stop reason: HOSPADM

## 2025-07-11 RX ORDER — OXYCODONE HYDROCHLORIDE 5 MG/1
5 TABLET ORAL EVERY 4 HOURS PRN
Status: DISCONTINUED | OUTPATIENT
Start: 2025-07-11 | End: 2025-07-12 | Stop reason: HOSPADM

## 2025-07-11 RX ORDER — SENNOSIDES 8.6 MG
650 CAPSULE ORAL EVERY 8 HOURS PRN
COMMUNITY
Start: 2025-07-11

## 2025-07-11 RX ORDER — DEXAMETHASONE SODIUM PHOSPHATE 4 MG/ML
4 INJECTION, SOLUTION INTRA-ARTICULAR; INTRALESIONAL; INTRAMUSCULAR; INTRAVENOUS; SOFT TISSUE
Status: DISCONTINUED | OUTPATIENT
Start: 2025-07-11 | End: 2025-07-11 | Stop reason: HOSPADM

## 2025-07-11 RX ADMIN — ATORVASTATIN CALCIUM 10 MG: 10 TABLET, FILM COATED ORAL at 22:02

## 2025-07-11 RX ADMIN — CEFAZOLIN SODIUM 2 G: 2 SOLUTION INTRAVENOUS at 17:41

## 2025-07-11 RX ADMIN — METOPROLOL SUCCINATE 25 MG: 25 TABLET, EXTENDED RELEASE ORAL at 22:03

## 2025-07-11 RX ADMIN — ACETAMINOPHEN 975 MG: 325 TABLET ORAL at 09:34

## 2025-07-11 RX ADMIN — SODIUM CHLORIDE, SODIUM LACTATE, POTASSIUM CHLORIDE, AND CALCIUM CHLORIDE: .6; .31; .03; .02 INJECTION, SOLUTION INTRAVENOUS at 08:56

## 2025-07-11 RX ADMIN — SODIUM CHLORIDE FOR IRRIGATION 3000 ML: 0.9 SOLUTION IRRIGATION at 16:05

## 2025-07-11 RX ADMIN — ACETAMINOPHEN 975 MG: 325 TABLET ORAL at 22:01

## 2025-07-11 RX ADMIN — BACITRACIN: 500 OINTMENT TOPICAL at 22:02

## 2025-07-11 RX ADMIN — SODIUM CHLORIDE: 0.9 INJECTION, SOLUTION INTRAVENOUS at 16:31

## 2025-07-11 RX ADMIN — BACITRACIN: 500 OINTMENT TOPICAL at 16:06

## 2025-07-11 ASSESSMENT — ACTIVITIES OF DAILY LIVING (ADL)
ADLS_ACUITY_SCORE: 26
ADLS_ACUITY_SCORE: 26
ADLS_ACUITY_SCORE: 18
ADLS_ACUITY_SCORE: 41
ADLS_ACUITY_SCORE: 26
ADLS_ACUITY_SCORE: 20
ADLS_ACUITY_SCORE: 18
ADLS_ACUITY_SCORE: 26
ADLS_ACUITY_SCORE: 18
ADLS_ACUITY_SCORE: 20
ADLS_ACUITY_SCORE: 18
ADLS_ACUITY_SCORE: 26
ADLS_ACUITY_SCORE: 26

## 2025-07-11 NOTE — INTERVAL H&P NOTE
No interval changes  Has BPH and incomplete emptying  We discussed the risks, benefits and alternatives of proceeding with a HoLEP. We discussed that the risks include, but are not limited to, bleeding, infection, injury to surrounding structures such as the bladder wall, ureters, sphincter, anesthetic risks such as heart attack, blood clot, stroke, or even sudden death. Specifically, and in detail, we discussed the risk of incontinence which I estimate to be 33% at 3 months after surgery. We also discussed the risk of ejaculatory dysfunction, and the minor risk of erectile dysfunction. Patient voiced understanding of the risks and benefits and wishes to proceed.

## 2025-07-11 NOTE — OP NOTE
Operative Report  7/11/2025    PREOPERATIVE DIAGNOSIS:  Prostatic hypertrophy with lower urinary tract symptoms  POSTOPERATIVE DIAGNOSIS: Same as above    PROCEDURE PERFORMED: Holmium laser enucleation of the prostate  STAFF SURGEON: Tata Ferris MD was present and participatory for the entire case.   RESIDENT(S): None  FELLOW (training): Jordin Rutherford MD    FINDINGS: Approximately 70 gm prostate with trilobar hypertrophy. Bladder with severe trabeculation. Approximately 20Fr bulbar urethral narrowing requiring dilation to 26Fr  ANESTHESIA: General  INTRAVENOUS FLUIDS: See anesthesia records  ESTIMATED BLOOD LOSS: 30cc   SPECIMENS: Prostate adenoma  DRAINS: 22-Pashto 3-way catheter with 40 ml in balloon     INDICATIONS FOR PROCEDURE: Velasquez Del Rio is a(n) 79 year old male who was seen in consultation for BPH with obstructive voiding symptoms and incomplete emptying of the bladder. He has elected treatment with laser enucleation. Prostate volume estimated to be 70 grams. After discussion of the risks, benefits and alternatives of the procedure, the patient agreed to proceed with the above stated procdure.    DESCRIPTION OF PROCEDURE: After obtaining informed consent, the patient was taken to the operating room and placed under general anesthesia.  He was repositioned in dorsal lithotomy making sure that the legs were positioned and padded safely.  He was then prepped and draped in standard sterile fashion.  Culture directed antibiotics were administered and bilateral sequential compression devices were placed.  A time out was performed confirming the appropriate patient identity and planned procedure.     The urethra was injected with lubricant jelly and the distal urethra was calibrated with sounds from 22 fr to 28fr sequentially in 2 fr increments. The distal urethra was notably tight. We then inserted the Gonzalez 24Fr sheath and advanced proximally until we encountered an approximately 20Fr bulbar  urethral narrowing. A super stiff wire was inserted cystoscopically and curled in the bladder, after which we sequentially dilated the stricture from 20Fr to 26Fr. The 24Fr Gonzalez scope was reinserted and able to be advanced past the now dilated stricture and into the bladder. The bladder demonstrated severe trabeculations but was otherwise unremarkable.  The ureteral orifices were orthotopic.  The prostate had Trilobar anatomy with outlet obstruction.    We began enucleation by making an apical incision adjacent to the veromontanum.  We developed the apical plane on the left side and carried this laterally until 3 oclock.  We then proceeded to make a counter incision in the same fashion on the right side.  We next dissected the prostate out in a circumferential fashion, coming over the top of the gland and releasing the apical attachments. We then carried this plane forward, entering the bladder neck.   We then proceeded to take down the remaining lateral and posterior attachments which allowed us to push the adenoma in an en-bloc fashion into the bladder. Total enucleation time was 54 minutes    At this point there was a moderate amount of bleeding and approximately 15 minutes were spent identifying bleeding vessels in the fossa and coagulating them with the laser.  Once hemostasis was adequate we switched from the laser resectoscope to the 26F offset telescope with the Piranha morcellation device.  We added an extra inflow to distend the bladder.  The blades were adjusted and set at a rate of 1500 RPM.  We proceeded with morcellation making sure that we morcellated the entirety of the adenoma.  Total morcellation time was 5 minutes.     We then switched back to the laser resectoscope one final time to ensure that no residual tissue was left in the bladder.  We also confirmed that the bladder was unharmed from morcellation and that both ureteral orifices were unharmed during the procedure.  We inspected the fossa  and obtained final hemostasis.   We looked the scope out noting that the sphincter was completely intact without evidence of thermal or mechanical injury.     We lubricated the urethra again and passed a 22F 3-way de oliveira catheter without catheter guide. We filled the balloon with 40  ml of sterile water and did not place the catheter on traction.  The patient was woken from anesthesia and taken to the recovery room in stable condition.     The specimen was weighed and found to be approximately 45 g.     POSTOPERATIVE PLAN:   - Admit overnight for observation  - De Oliveira to remain in place until Monday   -Labs CBC in PACU, CBC/BMP in AM  -Home Antibiotics Macrobid 100 bid 1 week      Tata Ferris MD was present and scrubbed for entire case

## 2025-07-12 VITALS
OXYGEN SATURATION: 92 % | TEMPERATURE: 97.5 F | HEART RATE: 74 BPM | HEIGHT: 68 IN | DIASTOLIC BLOOD PRESSURE: 67 MMHG | WEIGHT: 186.8 LBS | RESPIRATION RATE: 18 BRPM | SYSTOLIC BLOOD PRESSURE: 114 MMHG | BODY MASS INDEX: 28.31 KG/M2

## 2025-07-12 LAB
ANION GAP SERPL CALCULATED.3IONS-SCNC: 8 MMOL/L (ref 7–15)
BUN SERPL-MCNC: 15.4 MG/DL (ref 8–23)
CALCIUM SERPL-MCNC: 8.9 MG/DL (ref 8.8–10.4)
CHLORIDE SERPL-SCNC: 107 MMOL/L (ref 98–107)
CREAT SERPL-MCNC: 0.82 MG/DL (ref 0.67–1.17)
EGFRCR SERPLBLD CKD-EPI 2021: 88 ML/MIN/1.73M2
ERYTHROCYTE [DISTWIDTH] IN BLOOD BY AUTOMATED COUNT: 12 % (ref 10–15)
GLUCOSE SERPL-MCNC: 101 MG/DL (ref 70–99)
HCO3 SERPL-SCNC: 25 MMOL/L (ref 22–29)
HCT VFR BLD AUTO: 40.8 % (ref 40–53)
HGB BLD-MCNC: 13.9 G/DL (ref 13.3–17.7)
MCH RBC QN AUTO: 31.4 PG (ref 26.5–33)
MCHC RBC AUTO-ENTMCNC: 34.1 G/DL (ref 31.5–36.5)
MCV RBC AUTO: 92 FL (ref 78–100)
PLATELET # BLD AUTO: 186 10E3/UL (ref 150–450)
POTASSIUM SERPL-SCNC: 4.3 MMOL/L (ref 3.4–5.3)
RBC # BLD AUTO: 4.43 10E6/UL (ref 4.4–5.9)
SODIUM SERPL-SCNC: 140 MMOL/L (ref 135–145)
WBC # BLD AUTO: 8.5 10E3/UL (ref 4–11)

## 2025-07-12 PROCEDURE — 250N000013 HC RX MED GY IP 250 OP 250 PS 637: Performed by: UROLOGY

## 2025-07-12 PROCEDURE — 85027 COMPLETE CBC AUTOMATED: CPT | Performed by: UROLOGY

## 2025-07-12 PROCEDURE — 36415 COLL VENOUS BLD VENIPUNCTURE: CPT | Performed by: UROLOGY

## 2025-07-12 PROCEDURE — 80048 BASIC METABOLIC PNL TOTAL CA: CPT | Performed by: UROLOGY

## 2025-07-12 PROCEDURE — 258N000001 HC RX 258: Performed by: UROLOGY

## 2025-07-12 RX ADMIN — POLYETHYLENE GLYCOL 3350 17 G: 17 POWDER, FOR SOLUTION ORAL at 08:32

## 2025-07-12 RX ADMIN — SODIUM CHLORIDE FOR IRRIGATION 6000 ML: 0.9 SOLUTION IRRIGATION at 01:30

## 2025-07-12 RX ADMIN — LOSARTAN POTASSIUM 25 MG: 25 TABLET, FILM COATED ORAL at 08:32

## 2025-07-12 RX ADMIN — SENNOSIDES AND DOCUSATE SODIUM 1 TABLET: 50; 8.6 TABLET ORAL at 08:32

## 2025-07-12 ASSESSMENT — ACTIVITIES OF DAILY LIVING (ADL)
ADLS_ACUITY_SCORE: 26

## 2025-07-12 NOTE — PLAN OF CARE
Goal Outcome Evaluation:    Date & Time: 7/12/25  Surgery/POD#: 1 Holmium Laser Enucleation of the Prostate  Behavior & Aggression: green   Fall Risk: Yes   Orientation:A&OX4  ABNL VS/O2:  VSS on RA   ABNL Labs: see chart   Pain Management: denied pain   Bowel/Bladder: de oliveira in place   Drains: PIV @75 NS .  Diet:regular  Number of times OUT OF BED this shift x1 walking in hallway   Anticipated  DC Date: 07/12/25   Significant Information: CBI running slow rate

## 2025-07-12 NOTE — PLAN OF CARE
Date & Time: 7/11/25 1280-2281  Surgery/POD#: POD 0 Holmium Laser Enucleation of the Prostate  Behavior & Aggression: Green  Fall Risk: No  Orientation: A&Ox4  ABNL VS/O2:VVS on RA  ABNL Labs: see results  Pain Management: denied pain  Bowel/Bladder: Whitney, -bm, -gas,  Drains: L PIV @ 75 NS, CBI running  Wounds/incisions: none  Diet: Regular, tolerating   Number of times OUT OF BED this shift: OOB walking in hallway x1  Tests/Procedures: n/a  Anticipated  DC Date: Pending   Significant Information: LS clear, CMS intact

## 2025-07-12 NOTE — PLAN OF CARE
Date & Time: 7/12/25 6225-1740  Surgery/POD#: POD 1 Holmium Laser Enucleation of the Prostate  Behavior & Aggression: Green  Fall Risk: No  Orientation: A&Ox4  ABNL VS/O2: VVS on RA  ABNL Labs: see results  Pain Management: denied pain  Bowel/Bladder: De Oliveira, -bm, +gas, BS active  Drains: L PIV removed, CBI disconnected  Wounds/incisions: none  Diet: Regular, tolerating   Number of times OUT OF BED this shift: OOB walking in hallway multiple times  Tests/Procedures: n/a  Anticipated  DC Date: Pending   Significant Information: LS clear, CMS intact, Discharge, medications, and de oliveira teaching to patient and family. Discharged around 1545.

## 2025-07-12 NOTE — PROGRESS NOTES
[facility]rology Progress Note          Assessment and Plan:     Active Problems:    BPH (benign prostatic hyperplasia)    Assessment: Postop day 1 status post a HoLEP with Dr. Ferris    Plan:     -CBI clamped and urine draining clear  - Ambulating well  - Plan for discharge to home today with Whitney catheter in place      Denis Acosta MD   [facility]rology  Office: [phone]               Interval History:     doing well; no issues overnight.  CBI has been clamped              Review of Systems:     The 5 point Review of Systems is negative other than noted in the HPI             Medications:     Current Facility-Administered Medications   Medication Dose Route Frequency Provider Last Rate Last Admin    acetaminophen (TYLENOL) tablet 975 mg  975 mg Oral Q8H Jordin Rutherford MD   975 mg at 07/11/25 2201    atorvastatin (LIPITOR) tablet 10 mg  10 mg Oral At Bedtime Jordin Rutherford MD   10 mg at 07/11/25 2202    bacitracin ointment   Topical TID Jordin Rutherford MD   Given at 07/11/25 2202    benzocaine-menthol (CHLORASEPTIC) 6-10 MG lozenge 1-2 lozenge  1-2 lozenge Buccal Q1H PRN Jordin Rutherford MD        [START ON 7/14/2025] bisacodyl (DULCOLAX) suppository 10 mg  10 mg Rectal Daily PRN Jordin Rutherford MD        lidocaine (LMX4) cream   Topical Q1H PRN Jordin Rutherford MD        lidocaine 1 % 0.1-1 mL  0.1-1 mL Other Q1H PRN Jordin Rutherford MD        losartan (COZAAR) tablet 25 mg  25 mg Oral Daily Jordin Rutherford MD   25 mg at 07/12/25 0832    [START ON 7/13/2025] magnesium hydroxide (MILK OF MAGNESIA) suspension 30 mL  30 mL Oral Daily PRN Jordin Rutherford MD        metoprolol succinate ER (TOPROL XL) 24 hr tablet 25 mg  25 mg Oral QPM Jordin Rutherford MD   25 mg at 07/11/25 2203    naloxone (NARCAN) injection 0.2 mg  0.2 mg Intravenous Q2 Min PRN Tata Ferris MD        Or    naloxone (NARCAN) injection 0.4 mg  0.4 mg Intravenous Q2 Min PRN Kenny Ferrisuvardhan, MD        Or     naloxone (NARCAN) injection 0.2 mg  0.2 mg Intramuscular Q2 Min PRN Tata Ferris MD        Or    naloxone (NARCAN) injection 0.4 mg  0.4 mg Intramuscular Q2 Min PRN Tata Ferris MD        ondansetron (ZOFRAN ODT) ODT tab 4 mg  4 mg Oral Q6H PRN Jordin Rutherford MD        Or    ondansetron (ZOFRAN) injection 4 mg  4 mg Intravenous Q6H PRN Jordin Rutherford MD        oxyCODONE IR (ROXICODONE) half-tab 2.5 mg  2.5 mg Oral Q4H PRN Jordin Rutherford MD        Or    oxyCODONE (ROXICODONE) tablet 5 mg  5 mg Oral Q4H PRN Jordin Rutherford MD        polyethylene glycol (MIRALAX) Packet 17 g  17 g Oral Daily Jordin Rutherford MD   17 g at 07/12/25 0832    prochlorperazine (COMPAZINE) injection 5 mg  5 mg Intravenous Q6H PRN Jordin Rutherford MD        Or    prochlorperazine (COMPAZINE) tablet 5 mg  5 mg Oral Q6H PRN Jordin Rutherford MD        senna-docusate (SENOKOT-S/PERICOLACE) 8.6-50 MG per tablet 1 tablet  1 tablet Oral BID Jordin Rutherford MD   1 tablet at 07/12/25 0832    sodium chloride (PF) 0.9% PF flush 3 mL  3 mL Intracatheter Q8H Jordin Rutherford MD   3 mL at 07/12/25 0622    sodium chloride (PF) 0.9% PF flush 3 mL  3 mL Intracatheter q1 min prn Jordin Rutherford MD        sodium chloride 0.9% irrigation (bag)   Irrigation Continuous Jordin Rutherford MD   6,000 mL at 07/12/25 0130                  Physical Exam:   Vitals were reviewed  Patient Vitals for the past 8 hrs:   BP Temp Temp src Pulse Resp SpO2   07/12/25 0733 114/67 97.5  F (36.4  C) Oral 74 18 92 %   07/12/25 0342 109/56 97.5  F (36.4  C) Oral 60 18 93 %     GEN: NAD, ambulating in his room  HEENT: EOMI  NECK: Supple  ABD: soft  EXT: No LE edema  : FoleyIn place draining clear urine with CBI clamped           Data:     Lab Results   Component Value Date    CR 0.82 07/12/2025    CR 1.01 05/16/2025    CR 0.91 06/27/2024    CR 0.94 06/26/2023    CR 0.83 07/25/2022     Lab Results   Component Value Date    WBC 8.5 07/12/2025    WBC 6.5 07/11/2025     WBC 7.6 05/16/2025    HGB 13.9 07/12/2025    HGB 13.1 (L) 07/11/2025    HGB 14.6 05/16/2025    HCT 40.8 07/12/2025    HCT 38.2 (L) 07/11/2025    HCT 42.9 05/16/2025    MCV 92 07/12/2025    MCV 91 07/11/2025    MCV 90 05/16/2025     07/12/2025     (L) 07/11/2025     05/16/2025     Lab Results   Component Value Date    INR 1.06 05/16/2025

## 2025-07-12 NOTE — DISCHARGE INSTRUCTIONS
Caring for Yourself after HoLEP  Holmium Laser Enucleation of the Prostate at [facility]    For the first 24 hours:  - do not drive of operate any heavy equipment  -do not make complex decisions of sign legal documents  -do not drink alcohol  -ask for supervision when cooking, caring for infants or doing other activities that could cause harm to others  -you may have some nausea and vomiting    As you heal:  - start eating slowly. Start with sips of liquids, then add solid food when ou can handle it. If you do not feel like eating solids, have liquids.  -drink up to 2.5 to 3 liters per day (10 to 12 cups). Having plenty of fluids will hep you stay hydrated. This can make peeing more comfortable. It will also keep blood clots from forming in your bladder.  -if you were taking prostate medicines before surgery, you may stop taking them (unless we tell you differently).  -if you had to stop taking blood thinners before surgery, your care team will tell you when to start taking then again    Urinating:  -You are going home with the following tubes or drains: de oliveira catheter.  Nurse/ to write care and instructions.  Treat the catheter like an extension of your body; do not let it get caught or snagged on anything.  Leave the catheter in place until your follow up.  - Catheter to be removed in clinic on 7/14/2025    After the catheter is removed  -for the next few days, you may feel stinging or burning when you pee. This type of pain is not usually helped by narcotic pain medicine  -you may feel a sense of urgency to reach the bathroom  -you may leak urine before being able to reach the bathroom. This leakage can be bothersome and can last up to 3 months after surgery.   -if leaking is not getting better by 6 weeks after surgery, we may suggest physical therapy to learn exercises that help with urinary control   -it is rare for leaking to last a year or more  -for the first 7 to 10 days after surgery,  "you may see flecks of tissues or scabs in your urine  -for the first 4 weeks after surgery, you may see blood at the start or end of your urine stream. It can come and go    Physical activity:  -you can resume normal physical activity (such as walking, flights of stars, driving) after surgery  -wait 2 weeks before doing aerobic activities  -wait 4 weeks before doing straddle activities, such as biking, lawn mowing, motorcycle, snowmobiling, etc.  -wait 4 weeks before having sexual activity. You may resume sexual activity when your urine looks clear   -you likely will not see fluid expelled from the penis when you have sex (\"dry orgasm\"). This is not a dangerous condition. Your body still makes seminal fluid but as a result of surgery, it  will be pushed into the bladder rather than out of the penis at the time of climax. You will pass the fluid the next time you pee.  -if you notice more blood in your urine as you resume a physical activity, stop that activity and take things more slowly    Pelvic Floor Exercises  Your pelvic floor muscles are located between your pubic bone and tailbone. These muscles support your bladder and rectum, helping you control when you pee, have bowel movements, and also playing a role in sexual function.  If you have an enlarged prostate (BPH), your pelvic floor muscles don't need to work as hard to stop urine from leaking. If these muscles aren't used, they can weaken, and your bladder may need to work harder.  After HoLEP surgery, your pelvic floor muscles might be too weak to support your bladder, which could lead to urine leakage. Doing pelvic floor exercises, like Kegel exercises, can strengthen these muscles and help you regain control over your bladder. These exercises can also improve sexual function for some men.  How to do Kegel exercises:  While urinating, try to stop the flow of urine by squeezing your pelvic floor muscles.  While sitting in a chair, squeeze your muscles to " lift your scrotum up toward your belly button.  Squeeze your muscles like you're trying to stop yourself from passing gas.  Once you know how to tighten your pelvic floor muscles, you can start doing exercises to strengthen them.  Steps to start:  Lie on your back with your legs relaxed and supported.  Squeeze and relax your pelvic floor muscles.  Once you're comfortable doing this while lying down, try doing the exercises while sitting or standing.  Tips for doing Kegel exercises:  Don't hold your breath.  Avoid squeezing your buttocks, thighs, or stomach muscles.  Relax completely between each squeeze.  Follow the  rule of 5's :  Do 5 Kegel exercises, 5 times a day.  Hold each squeeze for 5 seconds.  Focus on doing the exercises correctly, rather than doing a lot of them. It may take some time to notice an improvement in your bladder control, but start as soon as possible after surgery to strengthen your pelvic floor and regain control over your bladder.    Follow up exam:  You will need a check-up in the clinic in 6 to 8 weeks. This is to see how well you are healing.    When should I call the [facility]?  Call the urology clinic if your have any of these signs and symptoms:  -not able to pee. If you are not able to reach a nurse or doctor within 60 minutes, go to the emergency room  -fever over 101.5 F (38.6 C) along with sweats and chills  -bright red blood in urine or large blood clots that make it hard to pee  -bad pain that doesn't get better with pain medicines  -leg pain  -nausea or vomiting that is bad or lasts beyond that first day    How do I call the clinic?  [phone]  Open Monday through Friday, 7am to 7pm.  If calling after hours, ask for the urologist on call

## 2025-07-14 ENCOUNTER — ALLIED HEALTH/NURSE VISIT (OUTPATIENT)
Dept: UROLOGY | Facility: CLINIC | Age: 80
End: 2025-07-14
Payer: COMMERCIAL

## 2025-07-14 DIAGNOSIS — N40.1 BENIGN PROSTATIC HYPERPLASIA WITH INCOMPLETE BLADDER EMPTYING: Primary | ICD-10-CM

## 2025-07-14 DIAGNOSIS — R39.14 BENIGN PROSTATIC HYPERPLASIA WITH INCOMPLETE BLADDER EMPTYING: Primary | ICD-10-CM

## 2025-07-14 PROCEDURE — 88305 TISSUE EXAM BY PATHOLOGIST: CPT | Mod: 26 | Performed by: PATHOLOGY

## 2025-07-14 PROCEDURE — 99207 PR NO CHARGE NURSE ONLY: CPT | Performed by: STUDENT IN AN ORGANIZED HEALTH CARE EDUCATION/TRAINING PROGRAM

## 2025-07-14 NOTE — PROGRESS NOTES
Chief Complaint   Patient presents with    Allied Health Visit     TOV       Patient Active Problem List   Diagnosis    Benign neoplasm of colon    Hyperlipidemia LDL goal <130    Herpes zoster    Diverticulosis of large intestine    Family history of colon cancer    Family history of malignant neoplasm of breast    Essential hypertension with goal blood pressure less than 140/90    Dermatitis    Chronic vasomotor rhinitis    Family history of rectal cancer    Heart failure with reduced ejection fraction (H)    Status post coronary angiogram    Abnormal nuclear stress test    Nonischemic cardiomyopathy (H)    BPH (benign prostatic hyperplasia)       Allergies   Allergen Reactions    Nkda [No Known Drug Allergy]        Current Outpatient Medications   Medication Sig Dispense Refill    acetaminophen (TYLENOL 8 HOUR) 650 MG CR tablet Take 1 tablet (650 mg) by mouth every 8 hours as needed for mild pain or fever.      [START ON 7/16/2025] apixaban ANTICOAGULANT (ELIQUIS) 5 MG tablet Take 1 tablet (5 mg) by mouth 2 times daily.      fluticasone (FLONASE) 50 MCG/ACT nasal spray Spray 1-2 sprays into both nostrils daily as needed for rhinitis. 51 g 3    hydrocortisone (CORTAID) 1 % external cream Apply sparingly to opening of ear canal twice a day for not more than 5 consecutive days      losartan (COZAAR) 25 MG tablet Take 1 tablet (25 mg) by mouth daily. 90 tablet 3    lovastatin (MEVACOR) 40 MG tablet Take 1 tablet (40 mg) by mouth at bedtime. 100 tablet 2    metoprolol succinate ER (TOPROL XL) 25 MG 24 hr tablet Take 1 tablet (25 mg) by mouth every evening. 90 tablet 3    nitroFURantoin macrocrystal-monohydrate (MACROBID) 100 MG capsule Take 1 capsule (100 mg) by mouth 2 times daily for 5 days. 10 capsule 0    phenazopyridine (PYRIDIUM) 200 MG tablet Take 1 tablet (200 mg) by mouth 3 times daily as needed (pain with urination). 9 tablet 0    senna-docusate (SENOKOT-S/PERICOLACE) 8.6-50 MG tablet Take 1 tablet by mouth  daily. To prevent constipation 30 tablet 0    triamcinolone (ARISTOCORT HP) 0.5 % external cream Apply topically twice a day as needed to affected area. Call to refill. 15 g 2       Social History     Tobacco Use    Smoking status: Former     Current packs/day: 0.00     Average packs/day: 1 pack/day for 7.0 years (7.0 ttl pk-yrs)     Types: Cigarettes     Start date: 1961     Quit date: 1968     Years since quittin.7    Smokeless tobacco: Never   Vaping Use    Vaping status: Never Used   Substance Use Topics    Alcohol use: Yes     Comment: minimal    Drug use: No       Velasquez Del Rio comes into clinic today at the request of Dr. Tata Ferris  for TOV.    Patient diagnosis: Post Holmium Laser Enucleation of the Prostate    This service provided today was under the direct supervision of Gia Rolle PA-C, who was available if needed.    Velasquez Del Rio referred to the clinic by Dr. Tata Ferris  for scheduled trial void.  Order has been verified: Yes.    Antibiotic given per protocol: No. Pt on abx from procedure.     Disconnected at de oliveira tube.   Instilled 60 ml of sterile water into bladder.   Patient tolerated well. Upon patient having urge to urinate and unable to tolerate more fluid, 35ml removed from balloon, de oliveira removed with tip intact.     Removal:  22 Fr straight tipped latex de oliveira catheter removed from urethral meatus without difficulty.    Patient able to urinate 70 ml.   Bladder scan revealed 17 ml post-void  Hand Portable Bladder Scanner used     Per Urology RN TOV guidelines: Patient successful with TOV and discharged home. To contact clinic with any questions or concerns. Should present to local ED if unable to urinate at home after clinic hours.    Patient did tolerate procedure well.    Patient instructed as to where to call or go for pain, fever, leakage, or decreased urine flow.       Humera Rodriguez RN  2025  10:16 AM

## 2025-07-15 ENCOUNTER — TELEPHONE (OUTPATIENT)
Dept: SURGERY | Facility: CLINIC | Age: 80
End: 2025-07-15
Payer: COMMERCIAL

## 2025-07-15 NOTE — TELEPHONE ENCOUNTER
Spoke to pt on phone and he is doing well  Urinating frequently and urine clear  No concerns  Will start AC tomorrow    Tata Ferris MD

## 2025-07-19 ENCOUNTER — TELEPHONE (OUTPATIENT)
Dept: SURGERY | Facility: CLINIC | Age: 80
End: 2025-07-19
Payer: COMMERCIAL

## 2025-07-19 DIAGNOSIS — N32.81 OVERACTIVE BLADDER: Primary | ICD-10-CM

## 2025-07-19 RX ORDER — OXYBUTYNIN CHLORIDE 5 MG/1
10 TABLET, EXTENDED RELEASE ORAL DAILY
Qty: 30 TABLET | Refills: 3 | Status: SHIPPED | OUTPATIENT
Start: 2025-07-19

## 2025-07-19 NOTE — TELEPHONE ENCOUNTER
Spoke to pt on phone  Having quite bit of urgency and frequency and nocturia  I think its his OAB  Talked about starting anticholinergic to help temporarily  Discussed side effects  He is okay with giving it a try

## 2025-07-21 ENCOUNTER — HOSPITAL ENCOUNTER (EMERGENCY)
Facility: CLINIC | Age: 80
Discharge: HOME | End: 2025-07-21
Attending: FAMILY MEDICINE
Payer: COMMERCIAL

## 2025-07-21 ENCOUNTER — APPOINTMENT (OUTPATIENT)
Dept: CT IMAGING | Facility: CLINIC | Age: 80
End: 2025-07-21
Attending: FAMILY MEDICINE
Payer: COMMERCIAL

## 2025-07-21 VITALS
TEMPERATURE: 97.4 F | HEART RATE: 94 BPM | WEIGHT: 182 LBS | BODY MASS INDEX: 27.67 KG/M2 | DIASTOLIC BLOOD PRESSURE: 76 MMHG | SYSTOLIC BLOOD PRESSURE: 159 MMHG | RESPIRATION RATE: 18 BRPM | OXYGEN SATURATION: 96 %

## 2025-07-21 DIAGNOSIS — S09.90XA CLOSED HEAD INJURY, INITIAL ENCOUNTER: ICD-10-CM

## 2025-07-21 PROCEDURE — 70450 CT HEAD/BRAIN W/O DYE: CPT

## 2025-07-21 PROCEDURE — 99284 EMERGENCY DEPT VISIT MOD MDM: CPT | Mod: 25 | Performed by: FAMILY MEDICINE

## 2025-07-21 PROCEDURE — 99283 EMERGENCY DEPT VISIT LOW MDM: CPT | Performed by: FAMILY MEDICINE

## 2025-07-21 ASSESSMENT — ACTIVITIES OF DAILY LIVING (ADL)
ADLS_ACUITY_SCORE: 44

## 2025-07-21 ASSESSMENT — COLUMBIA-SUICIDE SEVERITY RATING SCALE - C-SSRS
6. HAVE YOU EVER DONE ANYTHING, STARTED TO DO ANYTHING, OR PREPARED TO DO ANYTHING TO END YOUR LIFE?: NO
1. IN THE PAST MONTH, HAVE YOU WISHED YOU WERE DEAD OR WISHED YOU COULD GO TO SLEEP AND NOT WAKE UP?: NO
2. HAVE YOU ACTUALLY HAD ANY THOUGHTS OF KILLING YOURSELF IN THE PAST MONTH?: NO

## 2025-07-21 NOTE — ED TRIAGE NOTES
Patient states he fell a week ago hitting his forehead he did not come in at the time. He states he had been off his blood thinners for 6 days in preparation for Prostate surgery at the time. He is here today because he is worried that he should have been checked out 6 days ago (his brother  of a brain bleed after a fall)     Triage Assessment (Adult)       Row Name 25 1012          Triage Assessment    Airway WDL WDL        Respiratory WDL    Respiratory WDL WDL        Skin Circulation/Temperature WDL    Skin Circulation/Temperature WDL WDL        Cardiac WDL    Cardiac WDL X;rhythm     Pulse Rate & Regularity tachycardic        Peripheral/Neurovascular WDL    Peripheral Neurovascular WDL WDL        Cognitive/Neuro/Behavioral WDL    Cognitive/Neuro/Behavioral WDL WDL

## 2025-07-21 NOTE — ED NOTES
Pt ambulating around the room, states he is getting impatient and wants to leave. Pt is willing to wait for CT results.

## 2025-07-21 NOTE — ED PROVIDER NOTES
History     Chief Complaint   Patient presents with    Fall     Patient fell a week ago      HPI    Velasquez Del Rio is a 79 year old male who presents to the emergency department with concerns for intracranial bleeding.  10 days ago he underwent prostate surgery for prostate cancer and prior to this he had discontinued his anticoagulation for surgery.  1 week ago, after his surgery while he was at home, he got up to go to the bathroom and stumbled and struck his head on his alarm clock and broke the clock.  He did not fall to the ground.  He has not been having any concussion symptoms.  He was not at that time on his anticoagulation but resumed the following day.  He then became concerned about whether he could have significant intracranial bleeding.  A brother had hit his head while on anticoagulation and  as a complication.  Currently the patient is not having headache, dizziness, confusion, focal neurologic symptoms of weakness or numbness in the extremities or difficulty with his speech, swallowing, vision.  He is not having any neck pain and did not sustain other injuries when he struck his head.    Allergies:  Allergies   Allergen Reactions    Nkda [No Known Drug Allergy]        Problem List:    Patient Active Problem List    Diagnosis Date Noted    BPH (benign prostatic hyperplasia) 2025     Priority: Medium    Nonischemic cardiomyopathy (H) 2025     Priority: Medium    Status post coronary angiogram 2025     Priority: Medium    Abnormal nuclear stress test 2025     Priority: Medium    Heart failure with reduced ejection fraction (H) 2025     Priority: Medium    Family history of rectal cancer 2020     Priority: Medium     Mother and daughter      Chronic vasomotor rhinitis 2017     Priority: Medium    Essential hypertension with goal blood pressure less than 140/90 2016     Priority: Medium    Dermatitis 2016     Priority: Medium    Family history of  malignant neoplasm of breast 2016     Priority: Medium     Sister, daughter, and 4 nieces.       Diverticulosis of large intestine 2015     Priority: Medium     Scope in 2011.      Family history of colon cancer 2015     Priority: Medium     Mother. Needs colonoscopy every 5 years.       Herpes zoster 2012     Priority: Medium     right lower extremity        Hyperlipidemia LDL goal <130 10/31/2010     Priority: Medium    Benign neoplasm of colon 2007     Priority: Low     Tubular adenoma-two noted on  colonoscopy. diverticulosis otherwise normal colonoscopy           Past Medical History:    No past medical history on file.    Past Surgical History:    Past Surgical History:   Procedure Laterality Date    COLONOSCOPY      negative     COLONOSCOPY  2011    Procedure:COLONOSCOPY; Colonoscopy; Surgeon:DARCIE FISHER; Location:[facility]    COLONOSCOPY N/A 8/10/2016    Procedure: COLONOSCOPY;  Surgeon: Morales Renteria MD;  Location: [facility]    COLONOSCOPY N/A 10/5/2023    Procedure: COLONOSCOPY, WITH POLYPECTOMY AND BIOPSY;  Surgeon: Emmanuel Kaiser MD;  Location: [facility]    CV CORONARY ANGIOGRAM N/A 2025    Procedure: Coronary Angiogram;  Surgeon: Johana Miller MD;  Location:  [facility]    LAPAROSCOPIC HERNIORRHAPHY INGUINAL BILATERAL Bilateral 2019    Procedure: Laparoscopic bilateral inguinal hernia repair and open umbilical hernia repair;  Surgeon: Mckinley Colindres MD;  Location: [facility]    LASER HOLMIUM ENUCLEATION PROSTATE N/A 2025    Procedure: Holmium Laser Enucleation of the Prostate;  Surgeon: Tata Ferris MD;  Location:  OR       Family History:    Family History   Problem Relation Age of Onset    Hypertension Mother     Cerebrovascular Disease Mother          of a stroke    Cancer - colorectal Mother         age 70s    Prostate Cancer Father         had prostate removed    Cancer Brother         bladder  cancer    Diabetes Maternal Grandmother     Colon Cancer Daughter 53        colon cancer surger    Breast Cancer Daughter     Asthma No family hx of     C.A.D. No family hx of     Melanoma No family hx of        Social History:  Marital Status:   [2]  Social History     Tobacco Use    Smoking status: Former     Current packs/day: 0.00     Average packs/day: 1 pack/day for 7.0 years (7.0 ttl pk-yrs)     Types: Cigarettes     Start date: 1961     Quit date: 1968     Years since quittin.7    Smokeless tobacco: Never   Vaping Use    Vaping status: Never Used   Substance Use Topics    Alcohol use: Yes     Comment: minimal    Drug use: No        Medications:    acetaminophen (TYLENOL 8 HOUR) 650 MG CR tablet  apixaban ANTICOAGULANT (ELIQUIS) 5 MG tablet  fluticasone (FLONASE) 50 MCG/ACT nasal spray  hydrocortisone (CORTAID) 1 % external cream  losartan (COZAAR) 25 MG tablet  lovastatin (MEVACOR) 40 MG tablet  metoprolol succinate ER (TOPROL XL) 25 MG 24 hr tablet  oxyBUTYnin ER (DITROPAN XL) 5 MG 24 hr tablet  triamcinolone (ARISTOCORT HP) 0.5 % external cream          Review of Systems  All other systems are reviewed and are negative    Physical Exam   BP: (!) 159/76  Pulse: 94  Temp: 97.4  F (36.3  C)  Resp: 18  Weight: 82.6 kg (182 lb)  SpO2: 96 %      Physical Exam  Nursing note and vitals were reviewed.  Constitutional: Awake and alert, adequately nourished and developed appearing 79-year-old in no apparent discomfort, who does not appear acutely ill, and who answers questions appropriately and cooperates with examination.  HEENT: Atraumatic and normocephalic.  No Portillo sign.  No raccoon eyes.  No CSF otorrhea or rhinorrhea.  Speech is fluent.  Voice quality is normal.  PERRL.  EOMI.   Neck: Freely mobile.  Pulmonary/Chest: Breathing is unlabored.   Neurological: Alert, oriented, thought content logical, coherent.  Cranial nerve testing is normal.  Motor strength is intact in the upper and  lower extremities.  Cerebellar testing is normal.  Skin: Warm, dry, no rashes.  Psychiatric: Affect congruent.    ED Course        Procedures              Critical Care time:  none     None         Recent Results (from the past 24 hours)   CT Head w/o Contrast    Narrative    EXAM: CT HEAD W/O CONTRAST  LOCATION: [facility]  DATE: 2025    INDICATION: fall; head injury  COMPARISON: None.  TECHNIQUE: Routine CT Head without IV contrast. Multiplanar reformats. Dose reduction techniques were used.    FINDINGS:  INTRACRANIAL CONTENTS: No intracranial hemorrhage, extraaxial collection, or mass effect.  No CT evidence of acute infarct. Mild to moderate presumed chronic small vessel ischemic changes in the hemispheric white matter, basal ganglia and thalami.   Moderate generalized volume loss. No hydrocephalus.     VISUALIZED ORBITS/SINUSES/MASTOIDS: No intraorbital abnormality. No paranasal sinus mucosal disease. No middle ear or mastoid effusion. Moderate right TMJ arthropathy.    BONES/SOFT TISSUES: No acute abnormality.      Impression    IMPRESSION:  1.  No CT evidence for acute intracranial process.  2.  Brain atrophy and presumed chronic microvascular ischemic changes as above.         Medications - No data to display    Assessments & Plan (with Medical Decision Making)     79-year-old male presented after head injury a week ago as described above.  He was not on anticoagulation at the time.  He was greatly concerned about intracranial bleeding given his brother  from complications of this.  His history and examination suggest a low risk for a serious intracranial injury but I am not opposed to CT scan to assess further for this which is his desire.  This was performed and reviewed by me as well as the radiologist and shows no evidence of acute traumatic injury.  I offered him reassurance and he does not need any specific follow-up at this point with no signs of concussion and no  concerns for delayed bleeding.  He expressed understanding and his questions were answered.    I have reviewed the nursing notes.    I have reviewed the findings, diagnosis, plan and need for follow up with the patient.         New Prescriptions    No medications on file       Final diagnoses:   Closed head injury, initial encounter       7/21/2025   [facility] EMERGENCY DEPT       Jung Segura MD  07/21/25 [id number]

## 2025-07-21 NOTE — DISCHARGE INSTRUCTIONS
There is no sign of any significant injury or bleeding in your brain from your fall.  You should not expect any further problems at this time.

## 2025-08-07 ENCOUNTER — TELEPHONE (OUTPATIENT)
Dept: SURGERY | Facility: CLINIC | Age: 80
End: 2025-08-07
Payer: COMMERCIAL

## 2025-08-07 DIAGNOSIS — K43.9 EPIGASTRIC HERNIA: Primary | ICD-10-CM

## 2025-08-19 ENCOUNTER — OFFICE VISIT (OUTPATIENT)
Dept: UROLOGY | Facility: CLINIC | Age: 80
End: 2025-08-19
Payer: COMMERCIAL

## 2025-08-19 VITALS
OXYGEN SATURATION: 98 % | SYSTOLIC BLOOD PRESSURE: 142 MMHG | BODY MASS INDEX: 27.58 KG/M2 | WEIGHT: 182 LBS | DIASTOLIC BLOOD PRESSURE: 74 MMHG | HEART RATE: 82 BPM | HEIGHT: 68 IN

## 2025-08-19 DIAGNOSIS — N40.1 BENIGN PROSTATIC HYPERPLASIA WITH INCOMPLETE BLADDER EMPTYING: Primary | ICD-10-CM

## 2025-08-19 DIAGNOSIS — R39.14 BENIGN PROSTATIC HYPERPLASIA WITH INCOMPLETE BLADDER EMPTYING: Primary | ICD-10-CM

## 2025-08-19 PROCEDURE — 99024 POSTOP FOLLOW-UP VISIT: CPT | Performed by: STUDENT IN AN ORGANIZED HEALTH CARE EDUCATION/TRAINING PROGRAM

## 2025-08-19 PROCEDURE — 1126F AMNT PAIN NOTED NONE PRSNT: CPT | Performed by: STUDENT IN AN ORGANIZED HEALTH CARE EDUCATION/TRAINING PROGRAM

## 2025-08-19 PROCEDURE — 3078F DIAST BP <80 MM HG: CPT | Performed by: STUDENT IN AN ORGANIZED HEALTH CARE EDUCATION/TRAINING PROGRAM

## 2025-08-19 PROCEDURE — 3077F SYST BP >= 140 MM HG: CPT | Performed by: STUDENT IN AN ORGANIZED HEALTH CARE EDUCATION/TRAINING PROGRAM

## 2025-08-19 ASSESSMENT — PAIN SCALES - GENERAL: PAINLEVEL_OUTOF10: NO PAIN (0)

## 2025-08-26 ENCOUNTER — OFFICE VISIT (OUTPATIENT)
Dept: FAMILY MEDICINE | Facility: CLINIC | Age: 80
End: 2025-08-26
Payer: COMMERCIAL

## 2025-08-26 VITALS
TEMPERATURE: 98.1 F | OXYGEN SATURATION: 96 % | RESPIRATION RATE: 20 BRPM | SYSTOLIC BLOOD PRESSURE: 120 MMHG | DIASTOLIC BLOOD PRESSURE: 68 MMHG | WEIGHT: 187.2 LBS | HEART RATE: 85 BPM | BODY MASS INDEX: 28.37 KG/M2 | HEIGHT: 68 IN

## 2025-08-26 DIAGNOSIS — I42.8 NON-ISCHEMIC CARDIOMYOPATHY (H): ICD-10-CM

## 2025-08-26 DIAGNOSIS — I48.0 PAROXYSMAL ATRIAL FIBRILLATION (H): ICD-10-CM

## 2025-08-26 DIAGNOSIS — Z01.818 PREOP GENERAL PHYSICAL EXAM: Primary | ICD-10-CM

## 2025-08-26 DIAGNOSIS — I50.20 HEART FAILURE WITH REDUCED EJECTION FRACTION (H): ICD-10-CM

## 2025-08-26 DIAGNOSIS — I10 ESSENTIAL HYPERTENSION WITH GOAL BLOOD PRESSURE LESS THAN 140/90: ICD-10-CM

## 2025-08-26 DIAGNOSIS — E78.5 HYPERLIPIDEMIA LDL GOAL <130: ICD-10-CM

## 2025-08-26 PROCEDURE — 99214 OFFICE O/P EST MOD 30 MIN: CPT | Performed by: FAMILY MEDICINE

## 2025-08-26 PROCEDURE — 93000 ELECTROCARDIOGRAM COMPLETE: CPT | Performed by: FAMILY MEDICINE

## 2025-08-26 PROCEDURE — 1126F AMNT PAIN NOTED NONE PRSNT: CPT | Performed by: FAMILY MEDICINE

## 2025-08-26 PROCEDURE — 3074F SYST BP LT 130 MM HG: CPT | Performed by: FAMILY MEDICINE

## 2025-08-26 PROCEDURE — 3078F DIAST BP <80 MM HG: CPT | Performed by: FAMILY MEDICINE

## 2025-08-26 ASSESSMENT — PAIN SCALES - GENERAL: PAINLEVEL_OUTOF10: NO PAIN (0)

## 2025-09-02 DIAGNOSIS — I48.0 PAROXYSMAL ATRIAL FIBRILLATION (H): ICD-10-CM

## (undated) DEVICE — KIT HAND CONTROL ANGIOTOUCH ACIST 65CM AT-P65

## (undated) DEVICE — STOCKING SLEEVE COMPRESSION CALF MED

## (undated) DEVICE — ENDO SNARE EXACTO COLD 9MM LOOP 2.4MMX230CM 00711115

## (undated) DEVICE — TUBING SET THERMEDX UROLOGY SGL USE LL0006

## (undated) DEVICE — INTRODUCER CATH VASC 5FRX10CM  MPIS-501-NT-U-SST

## (undated) DEVICE — ENDO TROCAR DISSECTING BALLOON OMS-PDBS2

## (undated) DEVICE — PACK TUR CUSTOM SBA15RUFSE

## (undated) DEVICE — SOL WATER IRRIG 1000ML BOTTLE 2F7114

## (undated) DEVICE — SOLUTION IV IRRIGATION 0.9% NACL 3L R8206

## (undated) DEVICE — BLADE CLIPPER 4406

## (undated) DEVICE — FILTER PIRANHA DISP 2228.901

## (undated) DEVICE — KIT TURNOVER FAIRVIEW SOUTHDALE HALF SP3890

## (undated) DEVICE — INTRO SHEATH 6FRX10CM PINNACLE RSS602

## (undated) DEVICE — GOWN XLG DISP 9545

## (undated) DEVICE — SU PROLENE 0 CT-1 30" 8424H

## (undated) DEVICE — MANIFOLD KIT ANGIO AUTOMATED 014613

## (undated) DEVICE — ENDO TROCAR BLUNT TIP KII BALLOON 12X100MM C0R47

## (undated) DEVICE — CATH DIAG 4FR JL 4.5 538417

## (undated) DEVICE — SYR 50ML CATH TIP W/O NDL 309620

## (undated) DEVICE — SU VICRYL 4-0 FS-2 27" J422-H

## (undated) DEVICE — LINEN TOWEL PACK X5 5464

## (undated) DEVICE — BAG URINARY DRAIN 4000ML LF 153509

## (undated) DEVICE — CATH ANGIO INFINITI 3DRC 4FRX100CM 538476

## (undated) DEVICE — DECANTER VIAL 2006S

## (undated) DEVICE — TUBING SET PIRANHA 41702208

## (undated) DEVICE — GLOVE BIOGEL MICRO 7 LATEX

## (undated) DEVICE — JELLY LUBRICATING SURGILUBE 4OZ TUBE

## (undated) DEVICE — TOTE ANGIO CORP PC15AT SAN32CC83O

## (undated) DEVICE — DRAPE GYN/UROLOGY FLUID POUCH TUR 29455

## (undated) DEVICE — DEFIB PRO-PADZ LVP LQD GEL ADULT 8900-2105-01

## (undated) DEVICE — TUBING SUCTION MEDI-VAC SOFT 3/16"X20' N520A

## (undated) DEVICE — FILTER ERBEJET ESM 2 DISP 077.0571

## (undated) DEVICE — SOL NACL 0.9% INJ 1000ML BAG 07983-09

## (undated) DEVICE — SUCTION MANIFOLD NEPTUNE 2 SYS 4 PORT 0702-020-000

## (undated) DEVICE — ENDO TROCAR SLEEVE KII ADV FIXATION 05X100MM CFS02

## (undated) DEVICE — DRAPE MAYO STAND 23X54 8337

## (undated) DEVICE — BLADE MORCELLATOR WOLF PIRANHA 4.75X385MM 49700103

## (undated) DEVICE — ENDO TROCAR FIRST ENTRY KII FIOS ADV FIX 05X100MM CFF03

## (undated) DEVICE — ESU PENCIL W/COATED BLADE E2450H

## (undated) DEVICE — BAG DECANTER STERILE WHITE DYNJDEC09

## (undated) DEVICE — CATH ANGIO JUDKINS JL4 6FRX100CM INFINITI 534620T

## (undated) DEVICE — TUBING IRRIG TUR Y TYPE 96" LF 6543-01

## (undated) DEVICE — INTRO SHEATH 4FRX10CM PINNACLE RSS402

## (undated) DEVICE — Device

## (undated) DEVICE — SOL NACL 0.9% IRRIG 1000ML BOTTLE 07138-09

## (undated) DEVICE — GLOVE PROTEXIS W/NEU-THERA 7.5  2D73TE75

## (undated) DEVICE — LASER FIBER HOLMIUM MOSES 550 D/F/L AC-10030120

## (undated) DEVICE — ADHESIVE SWIFTSET 0.8ML OCTYL SS6

## (undated) DEVICE — CATH FOLEY 3WAY 22FR 30ML LATEX 0167SI22

## (undated) DEVICE — PREP CHLORAPREP 26ML TINTED ORANGE  260815

## (undated) DEVICE — WIRE GUIDE AMPLATZ SUPER STIFF 0.035"X145CM 46-524

## (undated) DEVICE — CATH ANGIO INFINITI 3DRC 6FRX100CM 534676T

## (undated) DEVICE — SPECIMEN TRAP TISSUE CONTAINER PIRANHA 2208120

## (undated) DEVICE — SU VICRYL 0 UR-6 27" J603H

## (undated) RX ORDER — ONDANSETRON 2 MG/ML
INJECTION INTRAMUSCULAR; INTRAVENOUS
Status: DISPENSED
Start: 2019-04-16

## (undated) RX ORDER — PROPOFOL 10 MG/ML
INJECTION, EMULSION INTRAVENOUS
Status: DISPENSED
Start: 2019-04-16

## (undated) RX ORDER — LIDOCAINE HYDROCHLORIDE 10 MG/ML
INJECTION, SOLUTION EPIDURAL; INFILTRATION; INTRACAUDAL; PERINEURAL
Status: DISPENSED
Start: 2025-05-16

## (undated) RX ORDER — FENTANYL CITRATE 50 UG/ML
INJECTION, SOLUTION INTRAMUSCULAR; INTRAVENOUS
Status: DISPENSED
Start: 2025-05-16

## (undated) RX ORDER — DEXAMETHASONE SODIUM PHOSPHATE 4 MG/ML
INJECTION, SOLUTION INTRA-ARTICULAR; INTRALESIONAL; INTRAMUSCULAR; INTRAVENOUS; SOFT TISSUE
Status: DISPENSED
Start: 2019-04-16

## (undated) RX ORDER — ONDANSETRON 2 MG/ML
INJECTION INTRAMUSCULAR; INTRAVENOUS
Status: DISPENSED
Start: 2025-07-11

## (undated) RX ORDER — HEPARIN SODIUM 200 [USP'U]/100ML
INJECTION, SOLUTION INTRAVENOUS
Status: DISPENSED
Start: 2025-05-16

## (undated) RX ORDER — ACETAMINOPHEN 325 MG/1
TABLET ORAL
Status: DISPENSED
Start: 2019-04-16

## (undated) RX ORDER — HYDROCODONE BITARTRATE AND ACETAMINOPHEN 5; 325 MG/1; MG/1
TABLET ORAL
Status: DISPENSED
Start: 2019-04-16

## (undated) RX ORDER — HYDROMORPHONE HYDROCHLORIDE 1 MG/ML
INJECTION, SOLUTION INTRAMUSCULAR; INTRAVENOUS; SUBCUTANEOUS
Status: DISPENSED
Start: 2025-07-11

## (undated) RX ORDER — FENTANYL CITRATE 50 UG/ML
INJECTION, SOLUTION INTRAMUSCULAR; INTRAVENOUS
Status: DISPENSED
Start: 2019-04-16

## (undated) RX ORDER — LIDOCAINE HYDROCHLORIDE 10 MG/ML
INJECTION, SOLUTION EPIDURAL; INFILTRATION; INTRACAUDAL; PERINEURAL
Status: DISPENSED
Start: 2019-04-16

## (undated) RX ORDER — ACETAMINOPHEN 325 MG/1
TABLET ORAL
Status: DISPENSED
Start: 2025-07-11

## (undated) RX ORDER — REGADENOSON 0.08 MG/ML
INJECTION, SOLUTION INTRAVENOUS
Status: DISPENSED
Start: 2025-04-30

## (undated) RX ORDER — EPHEDRINE SULFATE 50 MG/ML
INJECTION, SOLUTION INTRAMUSCULAR; INTRAVENOUS; SUBCUTANEOUS
Status: DISPENSED
Start: 2019-04-16

## (undated) RX ORDER — CEFAZOLIN SODIUM 2 G/100ML
INJECTION, SOLUTION INTRAVENOUS
Status: DISPENSED
Start: 2019-04-16

## (undated) RX ORDER — CELECOXIB 200 MG/1
CAPSULE ORAL
Status: DISPENSED
Start: 2019-04-16

## (undated) RX ORDER — PROPOFOL 10 MG/ML
INJECTION, EMULSION INTRAVENOUS
Status: DISPENSED
Start: 2025-07-11

## (undated) RX ORDER — PHENYLEPHRINE HCL IN 0.9% NACL 1 MG/10 ML
SYRINGE (ML) INTRAVENOUS
Status: DISPENSED
Start: 2019-04-16

## (undated) RX ORDER — FENTANYL CITRATE 50 UG/ML
INJECTION, SOLUTION INTRAMUSCULAR; INTRAVENOUS
Status: DISPENSED
Start: 2025-07-11

## (undated) RX ORDER — BUPIVACAINE HYDROCHLORIDE AND EPINEPHRINE 5; 5 MG/ML; UG/ML
INJECTION, SOLUTION EPIDURAL; INTRACAUDAL; PERINEURAL
Status: DISPENSED
Start: 2019-04-16

## (undated) RX ORDER — DEXAMETHASONE SODIUM PHOSPHATE 4 MG/ML
INJECTION, SOLUTION INTRA-ARTICULAR; INTRALESIONAL; INTRAMUSCULAR; INTRAVENOUS; SOFT TISSUE
Status: DISPENSED
Start: 2025-07-11

## (undated) RX ORDER — HEPARIN SODIUM 1000 [USP'U]/ML
INJECTION, SOLUTION INTRAVENOUS; SUBCUTANEOUS
Status: DISPENSED
Start: 2025-05-16

## (undated) RX ORDER — HYDROMORPHONE HYDROCHLORIDE 1 MG/ML
INJECTION, SOLUTION INTRAMUSCULAR; INTRAVENOUS; SUBCUTANEOUS
Status: DISPENSED
Start: 2019-04-16